# Patient Record
Sex: FEMALE | Race: WHITE | NOT HISPANIC OR LATINO | Employment: FULL TIME | URBAN - METROPOLITAN AREA
[De-identification: names, ages, dates, MRNs, and addresses within clinical notes are randomized per-mention and may not be internally consistent; named-entity substitution may affect disease eponyms.]

---

## 2017-02-09 ENCOUNTER — OFFICE VISIT (OUTPATIENT)
Dept: MEDICAL GROUP | Facility: MEDICAL CENTER | Age: 62
End: 2017-02-09
Payer: COMMERCIAL

## 2017-02-09 VITALS
TEMPERATURE: 97.8 F | WEIGHT: 109 LBS | BODY MASS INDEX: 21.4 KG/M2 | DIASTOLIC BLOOD PRESSURE: 72 MMHG | HEART RATE: 78 BPM | OXYGEN SATURATION: 98 % | SYSTOLIC BLOOD PRESSURE: 102 MMHG | HEIGHT: 60 IN

## 2017-02-09 DIAGNOSIS — Z12.11 SCREENING FOR COLON CANCER: ICD-10-CM

## 2017-02-09 DIAGNOSIS — Z13.29 SCREENING FOR THYROID DISORDER: ICD-10-CM

## 2017-02-09 DIAGNOSIS — J01.00 ACUTE NON-RECURRENT MAXILLARY SINUSITIS: ICD-10-CM

## 2017-02-09 DIAGNOSIS — Z78.0 POSTMENOPAUSAL: ICD-10-CM

## 2017-02-09 DIAGNOSIS — Z13.220 SCREENING FOR HYPERLIPIDEMIA: ICD-10-CM

## 2017-02-09 DIAGNOSIS — Z13.21 ENCOUNTER FOR VITAMIN DEFICIENCY SCREENING: ICD-10-CM

## 2017-02-09 DIAGNOSIS — R53.83 FATIGUE, UNSPECIFIED TYPE: ICD-10-CM

## 2017-02-09 DIAGNOSIS — Z13.1 SCREENING FOR DIABETES MELLITUS: ICD-10-CM

## 2017-02-09 DIAGNOSIS — Z13.0 SCREENING FOR DEFICIENCY ANEMIA: ICD-10-CM

## 2017-02-09 DIAGNOSIS — Z12.31 ENCOUNTER FOR SCREENING MAMMOGRAM FOR BREAST CANCER: ICD-10-CM

## 2017-02-09 PROBLEM — J06.9 VIRAL UPPER RESPIRATORY TRACT INFECTION: Status: ACTIVE | Noted: 2017-02-09

## 2017-02-09 PROCEDURE — 99204 OFFICE O/P NEW MOD 45 MIN: CPT | Performed by: FAMILY MEDICINE

## 2017-02-09 RX ORDER — AMOXICILLIN 500 MG/1
1000 CAPSULE ORAL 2 TIMES DAILY
Qty: 40 CAP | Refills: 0 | Status: SHIPPED | OUTPATIENT
Start: 2017-02-09 | End: 2017-08-11

## 2017-02-09 ASSESSMENT — PATIENT HEALTH QUESTIONNAIRE - PHQ9: CLINICAL INTERPRETATION OF PHQ2 SCORE: 0

## 2017-02-09 NOTE — MR AVS SNAPSHOT
Aminah Maldonado   2017 8:40 AM   Office Visit   MRN: [id number]    Department:  [facility]   Dept Phone:  [phone]    Description:  Female : 1955   Provider:  Evi Suggs M.D.           Reason for Visit     Establish Care     URI 1 week      Allergies as of 2017     No Known Allergies      You were diagnosed with     Acute non-recurrent maxillary sinusitis   [7582075]       Screening for deficiency anemia   [625828]       Screening for diabetes mellitus   [V77.1.ICD-9-CM]       Screening for hyperlipidemia   [374433]       Encounter for screening mammogram for breast cancer   [1293499]       Screening for thyroid disorder   [V77.0.ICD-9-CM]       Encounter for vitamin deficiency screening   [408185]       Postmenopausal   [429519]       Fatigue, unspecified type   [0368692]       Screening for colon cancer   [562929]         Vital Signs     Blood Pressure Pulse Temperature Height Weight Body Mass Index    102/72 mmHg 78 36.6 °C (97.8 °F) 1.524 m (5') 49.442 kg (109 lb) 21.29 kg/m2    Oxygen Saturation Breastfeeding? Smoking Status             98% No Never Smoker          Basic Information     Date Of Birth Sex Race Ethnicity Preferred Language    1955 Female White Non- English      Problem List              ICD-10-CM Priority Class Noted - Resolved    Acute non-recurrent maxillary sinusitis J01.00   2017 - Present    Fatigue R53.83   2017 - Present      Health Maintenance        Date Due Completion Dates    IMM DTaP/Tdap/Td Vaccine (1 - Tdap) 1976 ---    PAP SMEAR 1978 ---    MAMMOGRAM 1997 ---    COLONOSCOPY 2007 ---    IMM INFLUENZA (1) 2016 ---            Current Immunizations     No immunizations on file.      Below and/or attached are the medications your provider expects you to take. Review all of your home medications and newly ordered medications with your provider and/or pharmacist. Follow medication instructions as  directed by your provider and/or pharmacist. Please keep your medication list with you and share with your provider. Update the information when medications are discontinued, doses are changed, or new medications (including over-the-counter products) are added; and carry medication information at all times in the event of emergency situations     Allergies:  No Known Allergies          Medications  Valid as of: February 09, 2017 -  9:03 AM    Generic Name Brand Name Tablet Size Instructions for use    Amoxicillin (Cap) AMOXIL 500 MG Take 2 Caps by mouth 2 times a day.        .                 Medicines prescribed today were sent to:     [id number]  [place], [place] - [address] AT [facility]    [address] [place] [address]    Phone: [phone] Fax: [phone]    Open 24 Hours?: No      Medication refill instructions:       If your prescription bottle indicates you have medication refills left, it is not necessary to call your provider’s office. Please contact your pharmacy and they will refill your medication.    If your prescription bottle indicates you do not have any refills left, you may request refills at any time through one of the following ways: The online [employer] system (except Urgent Care), by calling your provider’s office, or by asking your pharmacy to contact your provider’s office with a refill request. Medication refills are processed only during regular business hours and may not be available until the next business day. Your provider may request additional information or to have a follow-up visit with you prior to refilling your medication.   *Please Note: Medication refills are assigned a new Rx number when refilled electronically. Your pharmacy may indicate that no refills were authorized even though a new prescription for the same medication is available at the pharmacy. Please request the medicine by name with the pharmacy before contacting your provider  for a refill.        Your To Do List     Future Labs/Procedures Complete By Expires    CBC WITH DIFFERENTIAL  As directed 2/10/2018    COMP METABOLIC PANEL  As directed 2/10/2018    DS-BONE DENSITY STUDY (DEXA)  As directed 8/12/2017    FREE THYROXINE  As directed 2/10/2018    LIPID PROFILE  As directed 2/10/2018    MA-SCREEN MAMMO W/CAD-BILAT  As directed 3/13/2018    TRIIDOTHYRONINE  As directed 2/9/2018    TSH  As directed 2/10/2018    VITAMIN D,25 HYDROXY  As directed 2/10/2018      Referral     A referral request has been sent to our patient care coordination department. Please allow 3-5 business days for us to process this request and contact you either by phone or mail. If you do not hear from us by the 5th business day, please call us at [phone].           TVSmiles Access Code: [email or web address]  Expires: 3/11/2017  8:12 AM    TVSmiles  A secure, online tool to manage your health information     [employer]’s TVSmiles® is a secure, online tool that connects you to your personalized health information from the privacy of your home -- day or night - making it very easy for you to manage your healthcare. Once the activation process is completed, you can even access your medical information using the TVSmiles azul, which is available for free in the Apple Azul store or Google Play store.     TVSmiles provides the following levels of access (as shown below):   My Chart Features   [facility] Primary [facility] Doctor [facility]  Specialists [facility]  Urgent  Care Non-[facility]  Primary Care  Doctor   Email your healthcare team securely and privately 24/7 X X X    Manage appointments: schedule your next appointment; view details of past/upcoming appointments X      Request prescription refills. X      View recent personal medical records, including lab and immunizations X X X X   View health record, including health history, allergies, medications X X X X   Read reports about your outpatient visits, procedures, consult and ER notes  X X X X   See your discharge summary, which is a recap of your hospital and/or ER visit that includes your diagnosis, lab results, and care plan. X X       How to register for [employer]:  1. Go to  [email or web address].  2. Click on the Sign Up Now box, which takes you to the New Member Sign Up page. You will need to provide the following information:  a. Enter your Polarion Software Access Code exactly as it appears at the top of this page. (You will not need to use this code after you’ve completed the sign-up process. If you do not sign up before the expiration date, you must request a new code.)   b. Enter your date of birth.   c. Enter your home email address.   d. Click Submit, and follow the next screen’s instructions.  3. Create a CatchFreet ID. This will be your CatchFreet login ID and cannot be changed, so think of one that is secure and easy to remember.  4. Create a [employer] Software password. You can change your password at any time.  5. Enter your Password Reset Question and Answer. This can be used at a later time if you forget your password.   6. Enter your e-mail address. This allows you to receive e-mail notifications when new information is available in [employer].  7. Click Sign Up. You can now view your health information.    For assistance activating your [employer] Software account, call [phone]

## 2017-02-09 NOTE — ASSESSMENT & PLAN NOTE
Started last Thursday or Friday with head cold.  Seems to be getting worse.  Now with left ear congestion, lots of yellow rhinorrhea.  No fever or chills,. Some cough at nighttime. No body aches.

## 2017-02-09 NOTE — ASSESSMENT & PLAN NOTE
Had for 1-2 years.  Consistent exercise 3-5 times a week for 45 minutes for 2 years,  Sleeps well.  Wakes tired.  Unsure if she snores. She does not believe she is ever been screened for obstructive sleep apnea.

## 2017-02-13 NOTE — PROGRESS NOTES
Chief Complaint   Patient presents with   • Establish Care   • URI     1 week         Aminah Maldonado is a 61 y.o. female here to establish care and for evaluation and management of:        HPI:    Acute non-recurrent maxillary sinusitis  Started last Thursday or Friday with head cold.  Seems to be getting worse.  Now with left ear congestion, lots of yellow rhinorrhea.  No fever or chills,. Some cough at nighttime. No body aches.    Fatigue  Had for 1-2 years.  Consistent exercise 3-5 times a week for 45 minutes for 2 years,  Sleeps well.  Wakes tired.  Unsure if she snores. She does not believe she is ever been screened for obstructive sleep apnea.        No Known Allergies    Current medicines (including changes today)  Current Outpatient Prescriptions   Medication Sig Dispense Refill   • amoxicillin (AMOXIL) 500 MG Cap Take 2 Caps by mouth 2 times a day. 40 Cap 0     No current facility-administered medications for this visit.     She  has a past medical history of Endometriosis.  She  has past surgical history that includes appendectomy; abdominal hysterectomy total; and tonsillectomy and adenoidectomy.  Social History   Substance Use Topics   • Smoking status: Never Smoker    • Smokeless tobacco: Never Used   • Alcohol Use: None     Social History     Social History Narrative   • No narrative on file     Family History   Problem Relation Age of Onset   • Heart Disease Mother    • Lung Disease Father    • Cancer Father 81     lung   • Diabetes Father    • Lung Disease Sister    • Cancer Sister 55     lung   • Diabetes Brother    • Kidney Disease Brother 49     on dialysis     Family Status   Relation Status Death Age   • Mother     • Father     • Sister Alive    • Brother Alive          ROS  No fever or chills.  No nausea or vomiting.  No chest pain or palpitations.  No  SOB.  No pain with urination or hematuria.  No black or bloody stools.  All other systems reviewed and are negative      Objective:     Blood pressure 102/72, pulse 78, temperature 36.6 °C (97.8 °F), height 1.524 m (5'), weight 49.442 kg (109 lb), SpO2 98 %, not currently breastfeeding. Body mass index is 21.29 kg/(m^2).  Physical Exam:      Well developed, well nourished.  Alert, oriented in no acute distress.  Psych: Eye contact is good, speech goal directed, affect calm  Eyes: conjunctiva non-injected, sclera non-icteric.  Ears: Pinna normal. TM pearly gray.   Nose: Nares are patent. Erythematous swollen mucosa with yellow discharge  Mouth: Oral mucous membranes pink and moist with no lesions. Moderate posterior pharynx erythema with yellow postnasal drainage  Neck Supple.  No adenopathy or masses in the neck or supraclavicular regions. No thyromegaly  Lungs: clear to auscultation bilaterally with good excursion. No wheezes or rhonchi  CV: regular rate and rhythm. No murmur        Assessment and Plan:   The following treatment plan was discussed    1. Acute non-recurrent maxillary sinusitis  Amoxicillin as directed. Increase fluids and rest. Call if not improving.  - amoxicillin (AMOXIL) 500 MG Cap; Take 2 Caps by mouth 2 times a day.  Dispense: 40 Cap; Refill: 0    2. Screening for deficiency anemia  Screening labs ordered.  Await results for counseling.    - CBC WITH DIFFERENTIAL; Future    3. Screening for diabetes mellitus  Screening labs ordered.  Await results for counseling.    - COMP METABOLIC PANEL; Future    4. Screening for hyperlipidemia  Screening labs ordered.  Await results for counseling.    - LIPID PROFILE; Future    5. Encounter for screening mammogram for breast cancer  Patient will schedule  - MA-SCREEN MAMMO W/CAD-BILAT; Future    6. Screening for thyroid disorder  Screening labs ordered.  Await results for counseling.    - TSH; Future  - FREE THYROXINE; Future  - TRIIDOTHYRONINE; Future    7. Encounter for vitamin deficiency screening  Screening labs ordered.  Await results for counseling.    - VITAMIN D,25  HYDROXY; Future    8. Postmenopausal  Screening for bone density  - DS-BONE DENSITY STUDY (DEXA); Future    9. Fatigue, unspecified type  Labs as noted. Refer for overnight pulse oximeter  - CBC WITH DIFFERENTIAL; Future  - COMP METABOLIC PANEL; Future  - TSH; Future  - FREE THYROXINE; Future  - TRIIDOTHYRONINE; Future    10. Screening for colon cancer  Refer for colonoscopy  - REFERRAL TO GI FOR COLONOSCOPY      Records requested.    Any change or worsening of signs or symptoms, patient encouraged to follow-up or report to the emergency room for further evaluation. Patient understands and agrees.    Followup: Return in about 1 year (around 2/9/2018).

## 2017-03-24 ENCOUNTER — HOSPITAL ENCOUNTER (OUTPATIENT)
Dept: LAB | Facility: MEDICAL CENTER | Age: 62
End: 2017-03-24
Attending: FAMILY MEDICINE
Payer: COMMERCIAL

## 2017-03-24 DIAGNOSIS — Z13.21 ENCOUNTER FOR VITAMIN DEFICIENCY SCREENING: ICD-10-CM

## 2017-03-24 DIAGNOSIS — Z13.29 SCREENING FOR THYROID DISORDER: ICD-10-CM

## 2017-03-24 DIAGNOSIS — Z13.1 SCREENING FOR DIABETES MELLITUS: ICD-10-CM

## 2017-03-24 DIAGNOSIS — Z13.0 SCREENING FOR DEFICIENCY ANEMIA: ICD-10-CM

## 2017-03-24 DIAGNOSIS — R53.83 FATIGUE, UNSPECIFIED TYPE: ICD-10-CM

## 2017-03-24 DIAGNOSIS — Z13.220 SCREENING FOR HYPERLIPIDEMIA: ICD-10-CM

## 2017-03-24 LAB
25(OH)D3 SERPL-MCNC: 38 NG/ML (ref 30–100)
ALBUMIN SERPL BCP-MCNC: 4.6 G/DL (ref 3.2–4.9)
ALBUMIN/GLOB SERPL: 1.8 G/DL
ALP SERPL-CCNC: 164 U/L (ref 30–99)
ALT SERPL-CCNC: 12 U/L (ref 2–50)
ANION GAP SERPL CALC-SCNC: 7 MMOL/L (ref 0–11.9)
AST SERPL-CCNC: 15 U/L (ref 12–45)
BASOPHILS # BLD AUTO: 0.07 K/UL (ref 0–0.12)
BASOPHILS NFR BLD AUTO: 1.9 % (ref 0–1.8)
BILIRUB SERPL-MCNC: 1.1 MG/DL (ref 0.1–1.5)
BUN SERPL-MCNC: 9 MG/DL (ref 8–22)
CALCIUM SERPL-MCNC: 9.6 MG/DL (ref 8.5–10.5)
CHLORIDE SERPL-SCNC: 102 MMOL/L (ref 96–112)
CHOLEST SERPL-MCNC: 174 MG/DL (ref 100–199)
CO2 SERPL-SCNC: 29 MMOL/L (ref 20–33)
CREAT SERPL-MCNC: 0.61 MG/DL (ref 0.5–1.4)
EOSINOPHIL # BLD: 0.13 K/UL (ref 0–0.51)
EOSINOPHIL NFR BLD AUTO: 3.6 % (ref 0–6.9)
ERYTHROCYTE [DISTWIDTH] IN BLOOD BY AUTOMATED COUNT: 43.8 FL (ref 35.9–50)
GLOBULIN SER CALC-MCNC: 2.6 G/DL (ref 1.9–3.5)
GLUCOSE SERPL-MCNC: 89 MG/DL (ref 65–99)
HCT VFR BLD AUTO: 41.6 % (ref 37–47)
HDLC SERPL-MCNC: 56 MG/DL
HGB BLD-MCNC: 13.6 G/DL (ref 12–16)
IMM GRANULOCYTES # BLD AUTO: 0.01 K/UL (ref 0–0.11)
IMM GRANULOCYTES NFR BLD AUTO: 0.3 % (ref 0–0.9)
LDLC SERPL CALC-MCNC: 105 MG/DL
LYMPHOCYTES # BLD: 1.05 K/UL (ref 1–4.8)
LYMPHOCYTES NFR BLD AUTO: 28.8 % (ref 22–41)
MCH RBC QN AUTO: 34.1 PG (ref 27–33)
MCHC RBC AUTO-ENTMCNC: 32.7 G/DL (ref 33.6–35)
MCV RBC AUTO: 104.3 FL (ref 81.4–97.8)
MONOCYTES # BLD: 0.26 K/UL (ref 0–0.85)
MONOCYTES NFR BLD AUTO: 7.1 % (ref 0–13.4)
NEUTROPHILS # BLD: 2.12 K/UL (ref 2–7.15)
NEUTROPHILS NFR BLD AUTO: 58.3 % (ref 44–72)
NRBC # BLD AUTO: 0 K/UL
NRBC BLD-RTO: 0 /100 WBC
PLATELET # BLD AUTO: 348 K/UL (ref 164–446)
PMV BLD AUTO: 10.8 FL (ref 9–12.9)
POTASSIUM SERPL-SCNC: 4 MMOL/L (ref 3.6–5.5)
PROT SERPL-MCNC: 7.2 G/DL (ref 6–8.2)
RBC # BLD AUTO: 3.99 M/UL (ref 4.2–5.4)
SODIUM SERPL-SCNC: 138 MMOL/L (ref 135–145)
T3 SERPL-MCNC: 172.8 NG/DL (ref 60–181)
T4 FREE SERPL-MCNC: 0.93 NG/DL (ref 0.53–1.43)
TRIGL SERPL-MCNC: 63 MG/DL (ref 0–149)
TSH SERPL DL<=0.005 MIU/L-ACNC: 1.45 UIU/ML (ref 0.3–3.7)
WBC # BLD AUTO: 3.6 K/UL (ref 4.8–10.8)

## 2017-03-24 PROCEDURE — 82306 VITAMIN D 25 HYDROXY: CPT

## 2017-03-24 PROCEDURE — 84480 ASSAY TRIIODOTHYRONINE (T3): CPT

## 2017-03-24 PROCEDURE — 84439 ASSAY OF FREE THYROXINE: CPT

## 2017-03-24 PROCEDURE — 84443 ASSAY THYROID STIM HORMONE: CPT

## 2017-03-24 PROCEDURE — 85025 COMPLETE CBC W/AUTO DIFF WBC: CPT

## 2017-03-24 PROCEDURE — 80053 COMPREHEN METABOLIC PANEL: CPT

## 2017-03-24 PROCEDURE — 80061 LIPID PANEL: CPT

## 2017-03-24 PROCEDURE — 36415 COLL VENOUS BLD VENIPUNCTURE: CPT

## 2017-03-29 ENCOUNTER — HOSPITAL ENCOUNTER (OUTPATIENT)
Dept: RADIOLOGY | Facility: MEDICAL CENTER | Age: 62
End: 2017-03-29

## 2017-03-29 ENCOUNTER — TELEPHONE (OUTPATIENT)
Dept: MEDICAL GROUP | Facility: MEDICAL CENTER | Age: 62
End: 2017-03-29

## 2017-03-29 DIAGNOSIS — D72.819 LEUKOPENIA, UNSPECIFIED TYPE: ICD-10-CM

## 2017-03-29 DIAGNOSIS — R74.8 ELEVATED ALKALINE PHOSPHATASE LEVEL: ICD-10-CM

## 2017-03-29 DIAGNOSIS — D75.89 MACROCYTOSIS: ICD-10-CM

## 2017-03-29 NOTE — TELEPHONE ENCOUNTER
Pt called and results were given. Pt states her alkaline phosphatase has been high for several years and is asking if there is a way to find out what is causing this. Pt would like to get to the bottom of this even if needs to be seen by a specialist. Please advise.

## 2017-03-29 NOTE — TELEPHONE ENCOUNTER
----- Message from Evi Suggs M.D. sent at 3/29/2017  3:57 PM PDT -----  Please notify patient of their lab results. Her alkaline phosphatase was elevated which can be related to the liver or bands. I would like to repeat it in 1 months. Her LDL cholesterol was a little high but her ratio is good. Discussed low-fat diet. Her white blood cell count was low but I don't have anything to compare to. I would like to repeat that in one month and also check B12 and folate levels. Her vitamin D and thyroid levels were normal.  Evi Suggs M.D.

## 2017-03-30 NOTE — TELEPHONE ENCOUNTER
Left message for patient to call back.     As per Dr. Suggs, she states that her alkaline being elevated could just be her normal, as she has had it several times and we do not have other labs to compare it to. So Dr. Suggs wants to repeat lab in 1 month.

## 2017-03-31 NOTE — TELEPHONE ENCOUNTER
Patient notified of Dr. Suggs's message, she will try to get her old lab results for Dr. Suggs to compare.

## 2017-03-31 NOTE — TELEPHONE ENCOUNTER
When we repeat her lab test in one month I'm also going to get another test that will help differentiate if it is from the bone or the liver. I would like to see copies of her old labs and depending on what those repeat results are we will do further testing.  Evi Suggs M.D.

## 2017-04-03 ENCOUNTER — HOSPITAL ENCOUNTER (OUTPATIENT)
Dept: RADIOLOGY | Facility: MEDICAL CENTER | Age: 62
End: 2017-04-03

## 2017-04-07 ENCOUNTER — HOSPITAL ENCOUNTER (OUTPATIENT)
Dept: LAB | Facility: MEDICAL CENTER | Age: 62
End: 2017-04-07
Attending: FAMILY MEDICINE
Payer: COMMERCIAL

## 2017-04-07 DIAGNOSIS — R74.8 ELEVATED ALKALINE PHOSPHATASE LEVEL: ICD-10-CM

## 2017-04-07 DIAGNOSIS — D75.89 MACROCYTOSIS: ICD-10-CM

## 2017-04-07 DIAGNOSIS — D72.819 LEUKOPENIA, UNSPECIFIED TYPE: ICD-10-CM

## 2017-04-07 LAB
ALP SERPL-CCNC: 166 U/L (ref 30–99)
GGT SERPL-CCNC: 14 U/L (ref 7–34)

## 2017-04-07 PROCEDURE — 36415 COLL VENOUS BLD VENIPUNCTURE: CPT

## 2017-04-07 PROCEDURE — 82977 ASSAY OF GGT: CPT

## 2017-04-07 PROCEDURE — 84075 ASSAY ALKALINE PHOSPHATASE: CPT

## 2017-04-11 ENCOUNTER — HOSPITAL ENCOUNTER (OUTPATIENT)
Dept: RADIOLOGY | Facility: MEDICAL CENTER | Age: 62
End: 2017-04-11
Attending: FAMILY MEDICINE
Payer: COMMERCIAL

## 2017-04-11 DIAGNOSIS — Z12.31 ENCOUNTER FOR SCREENING MAMMOGRAM FOR BREAST CANCER: ICD-10-CM

## 2017-04-11 DIAGNOSIS — Z78.0 POSTMENOPAUSAL: ICD-10-CM

## 2017-04-11 PROCEDURE — 77063 BREAST TOMOSYNTHESIS BI: CPT

## 2017-04-11 PROCEDURE — 77080 DXA BONE DENSITY AXIAL: CPT

## 2017-04-14 ENCOUNTER — TELEPHONE (OUTPATIENT)
Dept: MEDICAL GROUP | Facility: MEDICAL CENTER | Age: 62
End: 2017-04-14

## 2017-04-14 NOTE — TELEPHONE ENCOUNTER
----- Message from Evi Suggs M.D. sent at 4/14/2017  9:01 AM PDT -----  Please notify patient of their lab results.  I would like her to recheck the alkaline phosphatase one more time but this needs to be first thing in the morning before she eats. Her mammogram was normal. Her bone density showed some osteopenia and we could either start her on medication or she could increase her weightbearing exercise and supplement with vitamin D. Please find out what her preferences.  Evi Suggs M.D.

## 2017-04-20 NOTE — TELEPHONE ENCOUNTER
Patient notified of results, she states she will have her lab work done at the end of this month and then make an appt to discuss results for her lab and bone density.

## 2017-05-20 ENCOUNTER — HOSPITAL ENCOUNTER (OUTPATIENT)
Dept: LAB | Facility: MEDICAL CENTER | Age: 62
End: 2017-05-20
Attending: FAMILY MEDICINE
Payer: COMMERCIAL

## 2017-05-20 LAB
ALP SERPL-CCNC: 173 U/L (ref 30–99)
BASOPHILS # BLD AUTO: 1.3 % (ref 0–1.8)
BASOPHILS # BLD: 0.07 K/UL (ref 0–0.12)
EOSINOPHIL # BLD AUTO: 0.07 K/UL (ref 0–0.51)
EOSINOPHIL NFR BLD: 1.3 % (ref 0–6.9)
ERYTHROCYTE [DISTWIDTH] IN BLOOD BY AUTOMATED COUNT: 44.1 FL (ref 35.9–50)
FOLATE SERPL-MCNC: 12.4 NG/ML
HCT VFR BLD AUTO: 42.1 % (ref 37–47)
HGB BLD-MCNC: 14 G/DL (ref 12–16)
IMM GRANULOCYTES # BLD AUTO: 0.02 K/UL (ref 0–0.11)
IMM GRANULOCYTES NFR BLD AUTO: 0.4 % (ref 0–0.9)
LYMPHOCYTES # BLD AUTO: 0.97 K/UL (ref 1–4.8)
LYMPHOCYTES NFR BLD: 17.9 % (ref 22–41)
MCH RBC QN AUTO: 34.3 PG (ref 27–33)
MCHC RBC AUTO-ENTMCNC: 33.3 G/DL (ref 33.6–35)
MCV RBC AUTO: 103.2 FL (ref 81.4–97.8)
MONOCYTES # BLD AUTO: 0.27 K/UL (ref 0–0.85)
MONOCYTES NFR BLD AUTO: 5 % (ref 0–13.4)
NEUTROPHILS # BLD AUTO: 4.01 K/UL (ref 2–7.15)
NEUTROPHILS NFR BLD: 74.1 % (ref 44–72)
NRBC # BLD AUTO: 0 K/UL
NRBC BLD AUTO-RTO: 0 /100 WBC
PLATELET # BLD AUTO: 335 K/UL (ref 164–446)
PMV BLD AUTO: 10.7 FL (ref 9–12.9)
RBC # BLD AUTO: 4.08 M/UL (ref 4.2–5.4)
VIT B12 SERPL-MCNC: 393 PG/ML (ref 211–911)
WBC # BLD AUTO: 5.4 K/UL (ref 4.8–10.8)

## 2017-05-20 PROCEDURE — 85025 COMPLETE CBC W/AUTO DIFF WBC: CPT

## 2017-05-20 PROCEDURE — 82746 ASSAY OF FOLIC ACID SERUM: CPT

## 2017-05-20 PROCEDURE — 84075 ASSAY ALKALINE PHOSPHATASE: CPT

## 2017-05-20 PROCEDURE — 82607 VITAMIN B-12: CPT

## 2017-05-20 PROCEDURE — 36415 COLL VENOUS BLD VENIPUNCTURE: CPT

## 2017-05-24 ENCOUNTER — TELEPHONE (OUTPATIENT)
Dept: MEDICAL GROUP | Facility: MEDICAL CENTER | Age: 62
End: 2017-05-24

## 2017-05-24 NOTE — TELEPHONE ENCOUNTER
----- Message from RAFY Bowden sent at 5/24/2017  3:39 PM PDT -----  Please inform patient:  Alkaline phosphatase continues to be elevated.  B12 and folate levels are normal. Blood counts are stable. Please schedule follow-up with Dr. Suggs to further discuss abnormal labs

## 2017-05-26 ENCOUNTER — OFFICE VISIT (OUTPATIENT)
Dept: MEDICAL GROUP | Facility: MEDICAL CENTER | Age: 62
End: 2017-05-26
Payer: COMMERCIAL

## 2017-05-26 VITALS
SYSTOLIC BLOOD PRESSURE: 104 MMHG | BODY MASS INDEX: 21.2 KG/M2 | TEMPERATURE: 98.3 F | HEART RATE: 63 BPM | DIASTOLIC BLOOD PRESSURE: 70 MMHG | HEIGHT: 60 IN | WEIGHT: 108 LBS | OXYGEN SATURATION: 98 %

## 2017-05-26 DIAGNOSIS — D75.89 MACROCYTOSIS WITHOUT ANEMIA: ICD-10-CM

## 2017-05-26 DIAGNOSIS — R40.0 UNCONTROLLED DAYTIME SOMNOLENCE: ICD-10-CM

## 2017-05-26 DIAGNOSIS — R74.8 ELEVATED SERUM ALKALINE PHOSPHATASE LEVEL: ICD-10-CM

## 2017-05-26 PROCEDURE — 99213 OFFICE O/P EST LOW 20 MIN: CPT | Performed by: FAMILY MEDICINE

## 2017-05-26 NOTE — MR AVS SNAPSHOT
Aminah Maldonado   2017 4:40 PM   Office Visit   MRN: [id number]    Department:  [facility]   Dept Phone:  [phone]    Description:  Female : 1955   Provider:  Evi Suggs M.D.           Reason for Visit     Results lab results      Allergies as of 2017     No Known Allergies      You were diagnosed with     Uncontrolled daytime somnolence   [634655]       Elevated serum alkaline phosphatase level   [012542]       Macrocytosis without anemia   [644522]         Vital Signs     Blood Pressure Pulse Temperature Height Weight Body Mass Index    104/70 mmHg 63 36.8 °C (98.3 °F) 1.524 m (5') 48.988 kg (108 lb) 21.09 kg/m2    Oxygen Saturation Breastfeeding? Smoking Status             98% No Never Smoker          Basic Information     Date Of Birth Sex Race Ethnicity Preferred Language    1955 Female White Non- English      Problem List              ICD-10-CM Priority Class Noted - Resolved    Acute non-recurrent maxillary sinusitis J01.00   2017 - Present    Fatigue R53.83   2017 - Present    Uncontrolled daytime somnolence R40.0   2017 - Present    Elevated serum alkaline phosphatase level R74.8   2017 - Present    Macrocytosis without anemia D75.89   2017 - Present      Health Maintenance        Date Due Completion Dates    COLONOSCOPY 2016 (Done)    Override on 2013: Done    IMM DTaP/Tdap/Td Vaccine (1 - Tdap) 2018 (Originally 1976) ---    MAMMOGRAM 2018, 2015 (Done)    Override on 2015: Done            Current Immunizations     No immunizations on file.      Below and/or attached are the medications your provider expects you to take. Review all of your home medications and newly ordered medications with your provider and/or pharmacist. Follow medication instructions as directed by your provider and/or pharmacist. Please keep your medication list with you and share with your provider.  Update the information when medications are discontinued, doses are changed, or new medications (including over-the-counter products) are added; and carry medication information at all times in the event of emergency situations     Allergies:  No Known Allergies          Medications  Valid as of: May 26, 2017 -  5:38 PM    Generic Name Brand Name Tablet Size Instructions for use    Amoxicillin (Cap) AMOXIL 500 MG Take 2 Caps by mouth 2 times a day.        .                 Medicines prescribed today were sent to:     [facility] [address] [place] [address], [place] - [address] AT [facility]    [address] [facility] [place] [address]    Phone: [phone] Fax: [phone]    Open 24 Hours?: No      Medication refill instructions:       If your prescription bottle indicates you have medication refills left, it is not necessary to call your provider’s office. Please contact your pharmacy and they will refill your medication.    If your prescription bottle indicates you do not have any refills left, you may request refills at any time through one of the following ways: The online [employer] system (except Urgent Care), by calling your provider’s office, or by asking your pharmacy to contact your provider’s office with a refill request. Medication refills are processed only during regular business hours and may not be available until the next business day. Your provider may request additional information or to have a follow-up visit with you prior to refilling your medication.   *Please Note: Medication refills are assigned a new Rx number when refilled electronically. Your pharmacy may indicate that no refills were authorized even though a new prescription for the same medication is available at the pharmacy. Please request the medicine by name with the pharmacy before contacting your provider for a refill.        Your To Do List     Future Labs/Procedures Complete By Expires    EUFEMIA ANTIBODY WITH REFLEX  As  directed 5/27/2018    C-TELOPEPTIDE SERUM B-CROSS-LINKED (ARUP)  As directed 5/26/2018    CRP QUANTITIVE (NON-CARDIAC)  As directed 5/27/2018    PTH INTACT (PTH ONLY)  As directed 5/26/2018    SPEP W/REFLEX TO ANDREA, A, G, M  As directed 5/26/2018    URINALYSIS,CULTURE IF INDICATED  As directed 5/26/2018    WESTERGREN SED RATE  As directed 5/27/2018         MyChart Status: Patient Declined

## 2017-06-07 ENCOUNTER — HOSPITAL ENCOUNTER (OUTPATIENT)
Dept: LAB | Facility: MEDICAL CENTER | Age: 62
End: 2017-06-07
Attending: FAMILY MEDICINE
Payer: COMMERCIAL

## 2017-06-07 DIAGNOSIS — R40.0 UNCONTROLLED DAYTIME SOMNOLENCE: ICD-10-CM

## 2017-06-07 DIAGNOSIS — D75.89 MACROCYTOSIS WITHOUT ANEMIA: ICD-10-CM

## 2017-06-07 DIAGNOSIS — R74.8 ELEVATED SERUM ALKALINE PHOSPHATASE LEVEL: ICD-10-CM

## 2017-06-07 PROCEDURE — 85652 RBC SED RATE AUTOMATED: CPT

## 2017-06-07 PROCEDURE — 86235 NUCLEAR ANTIGEN ANTIBODY: CPT | Mod: 91

## 2017-06-07 PROCEDURE — 36415 COLL VENOUS BLD VENIPUNCTURE: CPT

## 2017-06-07 PROCEDURE — 86140 C-REACTIVE PROTEIN: CPT

## 2017-06-07 PROCEDURE — 83970 ASSAY OF PARATHORMONE: CPT

## 2017-06-07 PROCEDURE — 86225 DNA ANTIBODY NATIVE: CPT

## 2017-06-07 PROCEDURE — 84165 PROTEIN E-PHORESIS SERUM: CPT

## 2017-06-07 PROCEDURE — 86038 ANTINUCLEAR ANTIBODIES: CPT

## 2017-06-07 PROCEDURE — 84160 ASSAY OF PROTEIN ANY SOURCE: CPT

## 2017-06-07 PROCEDURE — 82523 COLLAGEN CROSSLINKS: CPT

## 2017-06-08 LAB
CRP SERPL HS-MCNC: 0.09 MG/DL (ref 0–0.75)
ERYTHROCYTE [SEDIMENTATION RATE] IN BLOOD BY WESTERGREN METHOD: 2 MM/HOUR (ref 0–30)
PTH-INTACT SERPL-MCNC: 38 PG/ML (ref 14–72)

## 2017-06-09 LAB
COLLAGEN CTX SERPL-MCNC: 351 PG/ML
NUCLEAR IGG SER QL IA: NORMAL

## 2017-06-10 LAB
ALBUMIN SERPL-MCNC: 4.94 G/DL (ref 3.75–5.01)
ALPHA1 GLOB SERPL ELPH-MCNC: 0.27 G/DL (ref 0.19–0.46)
ALPHA2 GLOB SERPL ELPH-MCNC: 0.65 G/DL (ref 0.48–1.05)
B-GLOBULIN SERPL ELPH-MCNC: 0.77 G/DL (ref 0.48–1.1)
GAMMA GLOB SERPL ELPH-MCNC: 0.88 G/DL (ref 0.62–1.51)
INTERPRETATION SERPL IFE-IMP: NORMAL
MONOCLON BAND OBS SERPL: NORMAL
PATHOLOGY STUDY: NORMAL
PROT SERPL-MCNC: 7.5 G/DL (ref 6–8.3)

## 2017-06-12 ENCOUNTER — TELEPHONE (OUTPATIENT)
Dept: MEDICAL GROUP | Facility: MEDICAL CENTER | Age: 62
End: 2017-06-12

## 2017-06-12 NOTE — TELEPHONE ENCOUNTER
----- Message from Evi Suggs M.D. sent at 6/9/2017  5:05 PM PDT -----  Please notify patient of their normal lab results.  Evi Suggs M.D.

## 2017-06-12 NOTE — Clinical Note
June 23, 2017        Aminah Maldonado,    I have been trying to call your regarding your concern for your symptoms of fatigue. Dr. Suggs wants to know if you ever did the order for the over night pulse oximeter, that was ordered back in May.    Please let me know and give me a call back at [phone].                Thanks,  Amara Baez  Medical Assistant

## 2017-06-13 NOTE — TELEPHONE ENCOUNTER
Pt notified. Pt would like to know what she should do about her fatigue.     Call back number: [phone], ok to leave a detailed message.

## 2017-06-20 ENCOUNTER — TELEPHONE (OUTPATIENT)
Dept: MEDICAL GROUP | Facility: MEDICAL CENTER | Age: 62
End: 2017-06-20

## 2017-06-20 NOTE — TELEPHONE ENCOUNTER
Left message for patient to call back. Did she ever do her over night pulse ox that was ordered back in May?

## 2017-06-20 NOTE — TELEPHONE ENCOUNTER
----- Message from Evi Suggs M.D. sent at 6/14/2017  9:44 AM PDT -----  Has she had an overnight oxcimeter?  If not we need to set this up.  Evi Suggs M.D.    ----- Message -----     From: Lab Int     Sent: 6/8/2017  12:38 AM       To: Evi Suggs M.D.

## 2017-06-22 NOTE — ASSESSMENT & PLAN NOTE
Patient feels exhausted when she wakes up in the morning. She does not feel rested. Her fatigue worsens as the day goes on.  she feels as if she could nap at any moment. She does not believe that she snores or has apneic episodes. She has not had any episodes where she has fallen asleep while driving or doing an important task.

## 2017-06-22 NOTE — ASSESSMENT & PLAN NOTE
Patient had an elevated alkaline phosphatase of 173. She denies any increased muscle pain, jaundice or dark urine.

## 2017-06-22 NOTE — PROGRESS NOTES
Subjective:     Chief Complaint   Patient presents with   • Results     lab results       Aminah Maldonado is a 61 y.o. female here today for evaluation and management of:    Uncontrolled daytime somnolence  Patient feels exhausted when she wakes up in the morning. She does not feel rested. Her fatigue worsens as the day goes on.  she feels as if she could nap at any moment. She does not believe that she snores or has apneic episodes. She has not had any episodes where she has fallen asleep while driving or doing an important task.    Elevated serum alkaline phosphatase level  Patient had an elevated alkaline phosphatase of 173. She denies any increased muscle pain, jaundice or dark urine.       No Known Allergies    Current medicines (including changes today)  Current Outpatient Prescriptions   Medication Sig Dispense Refill   • amoxicillin (AMOXIL) 500 MG Cap Take 2 Caps by mouth 2 times a day. (Patient not taking: Reported on 5/26/2017) 40 Cap 0     No current facility-administered medications for this visit.       She  has a past medical history of Endometriosis.    Patient Active Problem List    Diagnosis Date Noted   • Uncontrolled daytime somnolence 05/26/2017   • Elevated serum alkaline phosphatase level 05/26/2017   • Macrocytosis without anemia 05/26/2017   • Acute non-recurrent maxillary sinusitis 02/09/2017   • Fatigue 02/09/2017       ROS   No fever or chills.  No nausea or vomiting.  No chest pain or palpitations.  No cough or SOB.  No pain with urination or hematuria.  No black or bloody stools.       Objective:     Blood pressure 104/70, pulse 63, temperature 36.8 °C (98.3 °F), height 1.524 m (5'), weight 48.988 kg (108 lb), SpO2 98 %, not currently breastfeeding. Body mass index is 21.09 kg/(m^2).   Physical Exam:  Well developed, well nourished.  Alert, oriented in no acute distress.  Eye contact is good, speech goal directed, affect calm  Eyes: conjunctiva non-injected, sclera  non-icteric.  Ears: Pinna normal. TM pearly gray.   Nose: Nares are patent.  Normal mucosa  Mouth: Oral mucous membranes pink and moist with no lesions.  Neck Supple.  No adenopathy or masses in the neck or supraclavicular regions. No thyromegaly  Lungs: clear to auscultation bilaterally with good excursion. No wheezes or rhonchi  CV: regular rate and rhythm. No murmur      Assessment and Plan:   The following treatment plan was discussed    1. Uncontrolled daytime somnolence   We will look at autoimmune and other causes for daytime somnolence. I've also asked the patient to get an overnight oximetry. Await the results  - SPEP W/REFLEX TO ANDREA, A, G, M; Future  - EUFEMIA ANTIBODY WITH REFLEX; Future  - CRP QUANTITIVE (NON-CARDIAC); Future  - WESTERGREN SED RATE; Future  - PTH INTACT (PTH ONLY); Future  - C-TELOPEPTIDE SERUM B-CROSS-LINKED ([facility]); Future    2. Elevated serum alkaline phosphatase level  See #1  - SPEP W/REFLEX TO ANDREA, A, G, M; Future  - EUFEMIA ANTIBODY WITH REFLEX; Future  - CRP QUANTITIVE (NON-CARDIAC); Future  - WESTERGREN SED RATE; Future  - PTH INTACT (PTH ONLY); Future  - C-TELOPEPTIDE SERUM B-CROSS-LINKED ([facility]); Future    3. Macrocytosis without anemia  She #1  - SPEP W/REFLEX TO ANDREA, A, G, M; Future  - EUFEMIA ANTIBODY WITH REFLEX; Future  - CRP QUANTITIVE (NON-CARDIAC); Future  - WESTERGREN SED RATE; Future  - PTH INTACT (PTH ONLY); Future  - C-TELOPEPTIDE SERUM B-CROSS-LINKED ([facility]); Future    Any change or worsening of signs or symptoms, patient encouraged to follow-up or report to the emergency room for further evaluation. Patient understands and agrees.    Followup: Return if symptoms worsen or fail to improve.

## 2017-06-29 ENCOUNTER — TELEPHONE (OUTPATIENT)
Dept: MEDICAL GROUP | Facility: MEDICAL CENTER | Age: 62
End: 2017-06-29

## 2017-06-29 NOTE — TELEPHONE ENCOUNTER
Patient left message stating that she has received a call from the company to have her overnight pulse ox done, but she wants to wait until end of August to schedule, as she will be out of town. She will call when she gets back to get it scheduled.

## 2017-07-02 ENCOUNTER — OFFICE VISIT (OUTPATIENT)
Dept: URGENT CARE | Facility: PHYSICIAN GROUP | Age: 62
End: 2017-07-02
Payer: COMMERCIAL

## 2017-07-02 VITALS
TEMPERATURE: 97.9 F | DIASTOLIC BLOOD PRESSURE: 80 MMHG | WEIGHT: 108 LBS | HEIGHT: 60 IN | HEART RATE: 71 BPM | OXYGEN SATURATION: 97 % | BODY MASS INDEX: 21.2 KG/M2 | SYSTOLIC BLOOD PRESSURE: 110 MMHG

## 2017-07-02 DIAGNOSIS — J30.9 ALLERGIC RHINITIS, UNSPECIFIED ALLERGIC RHINITIS TRIGGER, UNSPECIFIED RHINITIS SEASONALITY: ICD-10-CM

## 2017-07-02 PROCEDURE — 99214 OFFICE O/P EST MOD 30 MIN: CPT | Performed by: FAMILY MEDICINE

## 2017-07-02 RX ORDER — FLUTICASONE PROPIONATE 50 MCG
1 SPRAY, SUSPENSION (ML) NASAL DAILY
Qty: 16 G | Refills: 0 | Status: SHIPPED | OUTPATIENT
Start: 2017-07-02 | End: 2018-05-30

## 2017-07-02 RX ORDER — CETIRIZINE HYDROCHLORIDE 10 MG/1
10 TABLET ORAL DAILY
Qty: 30 TAB | Refills: 0 | Status: SHIPPED | OUTPATIENT
Start: 2017-07-02 | End: 2018-05-30

## 2017-07-02 ASSESSMENT — ENCOUNTER SYMPTOMS
CHILLS: 0
VOMITING: 0
NAUSEA: 0
FEVER: 0

## 2017-07-02 NOTE — PROGRESS NOTES
Subjective:      Aminah Maldonado is a 61 y.o. female who presents with Allergic Rhinitis            Allergic Reaction  This is a new problem. The current episode started more than 1 year ago. The problem occurs constantly. The problem has been gradually worsening. Pertinent negatives include no chills, fever, nausea or vomiting.       Review of Systems   Constitutional: Negative for fever and chills.   Gastrointestinal: Negative for nausea and vomiting.     No Known Allergies      Objective:     /80 mmHg  Pulse 71  Temp(Src) 36.6 °C (97.9 °F)  Ht 1.524 m (5')  Wt 48.988 kg (108 lb)  BMI 21.09 kg/m2  SpO2 97%     Physical Exam   Constitutional: She is oriented to person, place, and time. She appears well-developed and well-nourished. No distress.   HENT:   Head: Normocephalic and atraumatic.   Nose: Mucosal edema and rhinorrhea present. No sinus tenderness. Right sinus exhibits no maxillary sinus tenderness and no frontal sinus tenderness. Left sinus exhibits no maxillary sinus tenderness and no frontal sinus tenderness.   Eyes: Conjunctivae and EOM are normal. Pupils are equal, round, and reactive to light.   Cardiovascular: Normal rate, regular rhythm, normal heart sounds and intact distal pulses.    No murmur heard.  Pulmonary/Chest: Effort normal and breath sounds normal. No respiratory distress.   Abdominal: Soft. Bowel sounds are normal. She exhibits no distension. There is no tenderness.   Musculoskeletal: Normal range of motion.   Neurological: She is alert and oriented to person, place, and time. She has normal reflexes. No sensory deficit.   Skin: Skin is warm and dry.   Psychiatric: She has a normal mood and affect. Her behavior is normal.               Assessment/Plan:     1. Allergic rhinitis, unspecified allergic rhinitis trigger, unspecified rhinitis seasonality  Differential diagnosis, natural history, supportive care, and indications for immediate follow-up discussed.   - fluticasone  (FLONASE) 50 MCG/ACT nasal spray; Spray 1 Spray in nose every day.  Dispense: 16 g; Refill: 0  - cetirizine (ZYRTEC ALLERGY) 10 MG Tab; Take 1 Tab by mouth every day.  Dispense: 30 Tab; Refill: 0

## 2017-07-02 NOTE — PATIENT INSTRUCTIONS
Allergic Rhinitis  Allergic rhinitis is when the mucous membranes in the nose respond to allergens. Allergens are particles in the air that cause your body to have an allergic reaction. This causes you to release allergic antibodies. Through a chain of events, these eventually cause you to release histamine into the blood stream. Although meant to protect the body, it is this release of histamine that causes your discomfort, such as frequent sneezing, congestion, and an itchy, runny nose.   CAUSES  Seasonal allergic rhinitis (hay fever) is caused by pollen allergens that may come from grasses, trees, and weeds. Year-round allergic rhinitis (perennial allergic rhinitis) is caused by allergens such as house dust mites, pet dander, and mold spores.  SYMPTOMS  · Nasal stuffiness (congestion).  · Itchy, runny nose with sneezing and tearing of the eyes.  DIAGNOSIS  Your health care provider can help you determine the allergen or allergens that trigger your symptoms. If you and your health care provider are unable to determine the allergen, skin or blood testing may be used. Your health care provider will diagnose your condition after taking your health history and performing a physical exam. Your health care provider may assess you for other related conditions, such as asthma, pink eye, or an ear infection.  TREATMENT  Allergic rhinitis does not have a cure, but it can be controlled by:  · Medicines that block allergy symptoms. These may include allergy shots, nasal sprays, and oral antihistamines.  · Avoiding the allergen.  Hay fever may often be treated with antihistamines in pill or nasal spray forms. Antihistamines block the effects of histamine. There are over-the-counter medicines that may help with nasal congestion and swelling around the eyes. Check with your health care provider before taking or giving this medicine.  If avoiding the allergen or the medicine prescribed do not work, there are many new medicines  your health care provider can prescribe. Stronger medicine may be used if initial measures are ineffective. Desensitizing injections can be used if medicine and avoidance does not work. Desensitization is when a patient is given ongoing shots until the body becomes less sensitive to the allergen. Make sure you follow up with your health care provider if problems continue.  HOME CARE INSTRUCTIONS  It is not possible to completely avoid allergens, but you can reduce your symptoms by taking steps to limit your exposure to them. It helps to know exactly what you are allergic to so that you can avoid your specific triggers.  SEEK MEDICAL CARE IF:  · You have a fever.  · You develop a cough that does not stop easily (persistent).  · You have shortness of breath.  · You start wheezing.  · Symptoms interfere with normal daily activities.     This information is not intended to replace advice given to you by your health care provider. Make sure you discuss any questions you have with your health care provider.     Document Released: 09/12/2002 Document Revised: 01/08/2016 Document Reviewed: 08/25/2014  [employer] Interactive Patient Education ©2016 [employer]

## 2017-08-04 ENCOUNTER — OFFICE VISIT (OUTPATIENT)
Dept: MEDICAL GROUP | Facility: MEDICAL CENTER | Age: 62
End: 2017-08-04
Payer: COMMERCIAL

## 2017-08-04 ENCOUNTER — HOSPITAL ENCOUNTER (OUTPATIENT)
Dept: LAB | Facility: MEDICAL CENTER | Age: 62
End: 2017-08-04
Attending: FAMILY MEDICINE
Payer: COMMERCIAL

## 2017-08-04 VITALS
HEART RATE: 85 BPM | TEMPERATURE: 98.4 F | DIASTOLIC BLOOD PRESSURE: 74 MMHG | SYSTOLIC BLOOD PRESSURE: 144 MMHG | OXYGEN SATURATION: 96 % | BODY MASS INDEX: 20.81 KG/M2 | HEIGHT: 60 IN | WEIGHT: 106 LBS

## 2017-08-04 DIAGNOSIS — R10.11 RUQ ABDOMINAL PAIN: ICD-10-CM

## 2017-08-04 DIAGNOSIS — R06.02 SOB (SHORTNESS OF BREATH): ICD-10-CM

## 2017-08-04 DIAGNOSIS — R74.8 ELEVATED SERUM ALKALINE PHOSPHATASE LEVEL: ICD-10-CM

## 2017-08-04 DIAGNOSIS — R05.9 COUGH: ICD-10-CM

## 2017-08-04 DIAGNOSIS — R42 DIZZINESS: ICD-10-CM

## 2017-08-04 LAB
ALBUMIN SERPL BCP-MCNC: 4.7 G/DL (ref 3.2–4.9)
ALBUMIN/GLOB SERPL: 1.4 G/DL
ALP SERPL-CCNC: 162 U/L (ref 30–99)
ALT SERPL-CCNC: 9 U/L (ref 2–50)
ANION GAP SERPL CALC-SCNC: 10 MMOL/L (ref 0–11.9)
AST SERPL-CCNC: 16 U/L (ref 12–45)
BASOPHILS # BLD AUTO: 0.6 % (ref 0–1.8)
BASOPHILS # BLD: 0.05 K/UL (ref 0–0.12)
BILIRUB SERPL-MCNC: 1.9 MG/DL (ref 0.1–1.5)
BUN SERPL-MCNC: 5 MG/DL (ref 8–22)
CALCIUM SERPL-MCNC: 10.2 MG/DL (ref 8.5–10.5)
CHLORIDE SERPL-SCNC: 103 MMOL/L (ref 96–112)
CO2 SERPL-SCNC: 27 MMOL/L (ref 20–33)
CREAT SERPL-MCNC: 0.59 MG/DL (ref 0.5–1.4)
DEPRECATED D DIMER PPP IA-ACNC: 228 NG/ML(D-DU)
EOSINOPHIL # BLD AUTO: 0.02 K/UL (ref 0–0.51)
EOSINOPHIL NFR BLD: 0.2 % (ref 0–6.9)
ERYTHROCYTE [DISTWIDTH] IN BLOOD BY AUTOMATED COUNT: 43.8 FL (ref 35.9–50)
GFR SERPL CREATININE-BSD FRML MDRD: >60 ML/MIN/1.73 M 2
GLOBULIN SER CALC-MCNC: 3.3 G/DL (ref 1.9–3.5)
GLUCOSE SERPL-MCNC: 95 MG/DL (ref 65–99)
HCT VFR BLD AUTO: 43.9 % (ref 37–47)
HGB BLD-MCNC: 14.5 G/DL (ref 12–16)
IMM GRANULOCYTES # BLD AUTO: 0.02 K/UL (ref 0–0.11)
IMM GRANULOCYTES NFR BLD AUTO: 0.2 % (ref 0–0.9)
LIPASE SERPL-CCNC: 14 U/L (ref 11–82)
LYMPHOCYTES # BLD AUTO: 1.02 K/UL (ref 1–4.8)
LYMPHOCYTES NFR BLD: 12 % (ref 22–41)
MCH RBC QN AUTO: 34.4 PG (ref 27–33)
MCHC RBC AUTO-ENTMCNC: 33 G/DL (ref 33.6–35)
MCV RBC AUTO: 104 FL (ref 81.4–97.8)
MONOCYTES # BLD AUTO: 0.64 K/UL (ref 0–0.85)
MONOCYTES NFR BLD AUTO: 7.5 % (ref 0–13.4)
NEUTROPHILS # BLD AUTO: 6.74 K/UL (ref 2–7.15)
NEUTROPHILS NFR BLD: 79.5 % (ref 44–72)
NRBC # BLD AUTO: 0 K/UL
NRBC BLD AUTO-RTO: 0 /100 WBC
PLATELET # BLD AUTO: 401 K/UL (ref 164–446)
PMV BLD AUTO: 11.2 FL (ref 9–12.9)
POTASSIUM SERPL-SCNC: 4.7 MMOL/L (ref 3.6–5.5)
PROT SERPL-MCNC: 8 G/DL (ref 6–8.2)
RBC # BLD AUTO: 4.22 M/UL (ref 4.2–5.4)
SODIUM SERPL-SCNC: 140 MMOL/L (ref 135–145)
WBC # BLD AUTO: 8.5 K/UL (ref 4.8–10.8)

## 2017-08-04 PROCEDURE — 85025 COMPLETE CBC W/AUTO DIFF WBC: CPT

## 2017-08-04 PROCEDURE — 36415 COLL VENOUS BLD VENIPUNCTURE: CPT

## 2017-08-04 PROCEDURE — 85379 FIBRIN DEGRADATION QUANT: CPT

## 2017-08-04 PROCEDURE — 80053 COMPREHEN METABOLIC PANEL: CPT

## 2017-08-04 PROCEDURE — 83690 ASSAY OF LIPASE: CPT

## 2017-08-04 PROCEDURE — 99214 OFFICE O/P EST MOD 30 MIN: CPT | Performed by: FAMILY MEDICINE

## 2017-08-04 NOTE — MR AVS SNAPSHOT
Aminah Maldonado   2017 8:40 AM   Office Visit   MRN: [id number]    Department:  [facility]   Dept Phone:  [phone]    Description:  Female : 1955   Provider:  Evi Suggs M.D.           Reason for Visit     RUQ Pain     Cough           Allergies as of 2017     No Known Allergies      You were diagnosed with     RUQ abdominal pain   [503376]       Cough   [786.2.ICD-9-CM]       Dizziness   [493778]       Elevated serum alkaline phosphatase level   [395693]       SOB (shortness of breath)   [485480]         Vital Signs     Blood Pressure Pulse Temperature Height Weight Body Mass Index    144/74 mmHg 85 36.9 °C (98.4 °F) 1.524 m (5') 48.081 kg (106 lb) 20.70 kg/m2    Oxygen Saturation Smoking Status                96% Never Smoker           Basic Information     Date Of Birth Sex Race Ethnicity Preferred Language    1955 Female White Non- English      Problem List              ICD-10-CM Priority Class Noted - Resolved    Acute non-recurrent maxillary sinusitis J01.00   2017 - Present    Fatigue R53.83   2017 - Present    Uncontrolled daytime somnolence R40.0   2017 - Present    Elevated serum alkaline phosphatase level R74.8   2017 - Present    Macrocytosis without anemia D75.89   2017 - Present    RUQ abdominal pain R10.11   2017 - Present    Cough R05   2017 - Present    Dizziness R42   2017 - Present    SOB (shortness of breath) R06.02   2017 - Present      Health Maintenance        Date Due Completion Dates    COLONOSCOPY 2016 (Done)    Override on 2013: Done    IMM INFLUENZA (1) 2017 (Originally 2017) ---    IMM DTaP/Tdap/Td Vaccine (1 - Tdap) 2018 (Originally 1976) ---    MAMMOGRAM 2018, 2015 (Done)    Override on 2015: Done            Current Immunizations     No immunizations on file.      Below and/or attached are the medications your provider expects you  to take. Review all of your home medications and newly ordered medications with your provider and/or pharmacist. Follow medication instructions as directed by your provider and/or pharmacist. Please keep your medication list with you and share with your provider. Update the information when medications are discontinued, doses are changed, or new medications (including over-the-counter products) are added; and carry medication information at all times in the event of emergency situations     Allergies:  No Known Allergies          Medications  Valid as of: August 04, 2017 - 10:32 AM    Generic Name Brand Name Tablet Size Instructions for use    Amoxicillin (Cap) AMOXIL 500 MG Take 2 Caps by mouth 2 times a day.        Cetirizine HCl (Tab) ZYRTEC 10 MG Take 1 Tab by mouth every day.        Fluticasone Propionate (Suspension) FLONASE 50 MCG/ACT Spray 1 Spray in nose every day.        .                 Medicines prescribed today were sent to:     [facility] [address] [place] [address], [place] - [address] AT [facility] [place] [address] [address] [facility] [address]    Phone: [phone] Fax: [phone]    Open 24 Hours?: No      Medication refill instructions:       If your prescription bottle indicates you have medication refills left, it is not necessary to call your provider’s office. Please contact your pharmacy and they will refill your medication.    If your prescription bottle indicates you do not have any refills left, you may request refills at any time through one of the following ways: The online [employer] system (except Urgent Care), by calling your provider’s office, or by asking your pharmacy to contact your provider’s office with a refill request. Medication refills are processed only during regular business hours and may not be available until the next business day. Your provider may request additional information or to have a follow-up visit with you prior to refilling your medication.   *Please  Note: Medication refills are assigned a new Rx number when refilled electronically. Your pharmacy may indicate that no refills were authorized even though a new prescription for the same medication is available at the pharmacy. Please request the medicine by name with the pharmacy before contacting your provider for a refill.        Your To Do List     Future Labs/Procedures Complete By Expires    CBC WITH DIFFERENTIAL  As directed 8/5/2018    COMP METABOLIC PANEL  As directed 8/5/2018    D-DIMER  As directed 8/4/2018    LIPASE  As directed 8/4/2018    US-ABDOMEN COMPLETE SURVEY  As directed 2/4/2018         "[employer] Access Code: Activation code not generated  Current [employer] Status: Active

## 2017-08-04 NOTE — ASSESSMENT & PLAN NOTE
"Complains of RUQ abdominal pain for the last 6 months but worsened one week ago.  She feels very fatigued and \"not right\".  Hurts to take a deep breath.  She has been coughing - clear to yellow mucus.  She has also had some shortness of breath.  Also has PND.  No fever or chills.  No nausea or vomiting.  Appetite is normal.  Last BM this am - no change.   If she takes a hot shower she has to go lay down afterwards because she is so light headed.  She also gets SOB after going up stairs.  Slight right calf pain but no swelling.  Recent air travel - went to [place] 7/15 - 7/25.  She reports her symptoms are worse first thing in the morning,  "

## 2017-08-07 NOTE — PROGRESS NOTES
"Subjective:     Chief Complaint   Patient presents with   • RUQ Pain   • Cough       Aminah Maldonado is a 61 y.o. female here today for evaluation and management of:    RUQ abdominal pain  Complains of RUQ abdominal pain for the last 6 months but worsened one week ago.  She feels very fatigued and \"not right\".  Hurts to take a deep breath.  She has been coughing - clear to yellow mucus.  She has also had some shortness of breath.  Also has PND.  No fever or chills.  No nausea or vomiting.  Appetite is normal.  Last BM this am - no change.   If she takes a hot shower she has to go lay down afterwards because she is so light headed.  She also gets SOB after going up stairs.  Slight right calf pain but no swelling.  Recent air travel - went to [place] 7/15 - 7/25.  She reports her symptoms are worse first thing in the morning,    Elevated serum alkaline phosphatase level  Patient has had an elevated alkaline phosphatase that we have been following.  She denies any jaundice or dark urine.       No Known Allergies    Current medicines (including changes today)  Current Outpatient Prescriptions   Medication Sig Dispense Refill   • fluticasone (FLONASE) 50 MCG/ACT nasal spray Spray 1 Spray in nose every day. 16 g 0   • cetirizine (ZYRTEC ALLERGY) 10 MG Tab Take 1 Tab by mouth every day. 30 Tab 0   • amoxicillin (AMOXIL) 500 MG Cap Take 2 Caps by mouth 2 times a day. (Patient not taking: Reported on 5/26/2017) 40 Cap 0     No current facility-administered medications for this visit.       She  has a past medical history of Endometriosis.    Patient Active Problem List    Diagnosis Date Noted   • RUQ abdominal pain 08/04/2017   • Cough 08/04/2017   • Dizziness 08/04/2017   • SOB (shortness of breath) 08/04/2017   • Uncontrolled daytime somnolence 05/26/2017   • Elevated serum alkaline phosphatase level 05/26/2017   • Macrocytosis without anemia 05/26/2017   • Acute non-recurrent maxillary sinusitis 02/09/2017   • Fatigue " 02/09/2017       ROS   No fever or chills.  No nausea or vomiting.  No chest pain or palpitations.  No cough or SOB.  No pain with urination or hematuria.  No black or bloody stools.       Objective:     Blood pressure 144/74, pulse 85, temperature 36.9 °C (98.4 °F), height 1.524 m (5'), weight 48.081 kg (106 lb), SpO2 96 %. Body mass index is 20.7 kg/(m^2).   Physical Exam:  Well developed, well nourished.  Alert, oriented in no acute distress.  Eye contact is good, speech goal directed, affect calm  Eyes: conjunctiva non-injected, sclera non-icteric.  Neck Supple.  No adenopathy or masses in the neck or supraclavicular regions. No thyromegaly  Lungs: clear to auscultation bilaterally with good excursion. No wheezes or rhonchi  CV: regular rate and rhythm. No murmur  Abdomen: soft, nontender, no masses or organomegaly.  No rebound or guarding  Ext: no edema, color normal, vascularity normal, temperature normal.  Mild bilateral calf tenderness          Assessment and Plan:   The following treatment plan was discussed    1. RUQ abdominal pain  Labs to assess.  Ultrasound of the abdomen  - CBC WITH DIFFERENTIAL; Future  - COMP METABOLIC PANEL; Future  - LIPASE; Future  - US-ABDOMEN COMPLETE SURVEY; Future    2. Cough  If she has a positive d-dimer she will need a stat CT of the thorax - PE protocol.  Patient has a Wells Criteria score of 0  - D-DIMER; Future    3. Dizziness  Increase fluids.  Await lab results    4. Elevated serum alkaline phosphatase level  Continue to monitor    5. SOB (shortness of breath)  See #2  - D-DIMER; Future    Any change or worsening of signs or symptoms, patient encouraged to follow-up or report to the emergency room for further evaluation. Patient understands and agrees.    Followup: Return in about 3 months (around 11/4/2017).

## 2017-08-07 NOTE — ASSESSMENT & PLAN NOTE
Patient has had an elevated alkaline phosphatase that we have been following.  She denies any jaundice or dark urine.

## 2017-08-11 ENCOUNTER — HOSPITAL ENCOUNTER (EMERGENCY)
Facility: MEDICAL CENTER | Age: 62
End: 2017-08-12
Attending: EMERGENCY MEDICINE
Payer: COMMERCIAL

## 2017-08-11 ENCOUNTER — HOSPITAL ENCOUNTER (OUTPATIENT)
Dept: RADIOLOGY | Facility: MEDICAL CENTER | Age: 62
End: 2017-08-11
Attending: FAMILY MEDICINE
Payer: COMMERCIAL

## 2017-08-11 DIAGNOSIS — R10.11 RUQ ABDOMINAL PAIN: ICD-10-CM

## 2017-08-11 DIAGNOSIS — T78.40XA ALLERGIC REACTION, INITIAL ENCOUNTER: ICD-10-CM

## 2017-08-11 PROCEDURE — A9270 NON-COVERED ITEM OR SERVICE: HCPCS | Performed by: EMERGENCY MEDICINE

## 2017-08-11 PROCEDURE — 700111 HCHG RX REV CODE 636 W/ 250 OVERRIDE (IP): Performed by: EMERGENCY MEDICINE

## 2017-08-11 PROCEDURE — 99284 EMERGENCY DEPT VISIT MOD MDM: CPT

## 2017-08-11 PROCEDURE — 76700 US EXAM ABDOM COMPLETE: CPT

## 2017-08-11 PROCEDURE — 700102 HCHG RX REV CODE 250 W/ 637 OVERRIDE(OP): Performed by: EMERGENCY MEDICINE

## 2017-08-11 RX ORDER — PREDNISONE 20 MG/1
40 TABLET ORAL DAILY
Qty: 6 TAB | Refills: 0 | Status: SHIPPED | OUTPATIENT
Start: 2017-08-11 | End: 2017-08-14

## 2017-08-11 RX ORDER — PREDNISONE 20 MG/1
40 TABLET ORAL ONCE
Status: COMPLETED | OUTPATIENT
Start: 2017-08-11 | End: 2017-08-11

## 2017-08-11 RX ORDER — EPINEPHRINE 0.3 MG/.3ML
0.3 INJECTION SUBCUTANEOUS ONCE
Qty: 0.3 ML | Refills: 0 | Status: SHIPPED | OUTPATIENT
Start: 2017-08-11 | End: 2017-08-11

## 2017-08-11 RX ORDER — DIPHENHYDRAMINE HCL 25 MG
50 TABLET ORAL ONCE
Status: COMPLETED | OUTPATIENT
Start: 2017-08-11 | End: 2017-08-11

## 2017-08-11 RX ADMIN — DIPHENHYDRAMINE HCL 50 MG: 25 TABLET ORAL at 23:00

## 2017-08-11 RX ADMIN — PREDNISONE 40 MG: 20 TABLET ORAL at 23:01

## 2017-08-11 ASSESSMENT — PAIN SCALES - GENERAL: PAINLEVEL_OUTOF10: 0

## 2017-08-11 NOTE — ED AVS SNAPSHOT
8/12/2017    Aminah Shukland  [address]  [facility] [address]    Dear Aminah:    [facility] wants to ensure your discharge home is safe and you or your loved ones have had all of your questions answered regarding your care after you leave the hospital.    Below is a list of resources and contact information should you have any questions regarding your hospital stay, follow-up instructions, or active medical symptoms.    Questions or Concerns Regarding… Contact   Medical Questions Related to Your Discharge  (7 days a week, 8am-5pm) Contact a Nurse Care Coordinator   [phone]   Medical Questions Not Related to Your Discharge  (24 hours a day / 7 days a week)  Contact the Nurse Health Line   [phone]    Medications or Discharge Instructions Refer to your discharge packet   or contact your [facility] Primary Care Provider   [phone]   Follow-up Appointment(s) Schedule your appointment via [employer]   or contact Scheduling [phone]   Billing Review your statement via [employer]  or contact Billing [phone]   Medical Records Review your records via [employer]   or contact Medical Records [phone]     You may receive a telephone call within two days of discharge. This call is to make certain you understand your discharge instructions and have the opportunity to have any questions answered. You can also easily access your medical information, test results and upcoming appointments via the [employer] free online health management tool. You can learn more and sign up at [employer]. For assistance setting up your [employer] account, please call [phone].    Once again, we want to ensure your discharge home is safe and that you have a clear understanding of any next steps in your care. If you have any questions or concerns, please do not hesitate to contact us, we are here for you. Thank you for choosing [facility] for your healthcare needs.    Sincerely,    Your [facility] Healthcare Team

## 2017-08-11 NOTE — ED AVS SNAPSHOT
Home Care Instructions                                                                                                                Aminah Maldonado   MRN: [id number]    Department:  [facility], Emergency Dept   Date of Visit:  8/11/2017            [facility], Emergency Dept    [address] [facility] [address]    Phone:  [phone]      You were seen by     Alex Wheeler M.D.      Your Diagnosis Was     Allergic reaction, initial encounter     T78.40XA       These are the medications you received during your hospitalization from 08/11/2017 2214 to 08/12/2017 0035     Date/Time Order Dose Route Action    08/11/2017 2301 predniSONE (DELTASONE) tablet 40 mg 40 mg Oral Given    08/11/2017 2300 diphenhydrAMINE (BENADRYL) tablet/capsule 50 mg 50 mg Oral Given      Follow-up Information     1. Follow up with Evi Suggs M.D..    Specialty:  Family Medicine    Contact information    [address]  [address]  [facility] [address] [phone]        Medication Information     Review all of your home medications and newly ordered medications with your primary doctor and/or pharmacist as soon as possible. Follow medication instructions as directed by your doctor and/or pharmacist.     Please keep your complete medication list with you and share with your physician. Update the information when medications are discontinued, doses are changed, or new medications (including over-the-counter products) are added; and carry medication information at all times in the event of emergency situations.               Medication List      START taking these medications        Instructions    Morning Afternoon Evening Bedtime    predniSONE 20 MG Tabs   Last time this was given:  40 mg on 8/11/2017 11:01 PM   Commonly known as:  DELTASONE        Take 2 Tabs by mouth every day for 3 days.   Dose:  40 mg                          ASK your doctor about these medications           Instructions    Morning Afternoon Evening Bedtime    cetirizine 10 MG Tabs   Commonly known as:  ZYRTEC ALLERGY        Take 1 Tab by mouth every day.   Dose:  10 mg                        fluticasone 50 MCG/ACT nasal spray   Commonly known as:  FLONASE        Spray 1 Spray in nose every day.   Dose:  1 Spray                             Where to Get Your Medications      These medications were sent to [id number] - [place], [place] - [address] AT [facility]  [address] [facility], [place] [address]     Phone:  [phone]    - predniSONE 20 MG Tabs              Discharge Instructions       Anaphylactic Reaction  An anaphylactic reaction is a sudden, severe allergic reaction that involves the whole body. It can be life threatening. A hospital stay is often required. People with asthma, eczema, or hay fever are slightly more likely to have an anaphylactic reaction.  CAUSES   An anaphylactic reaction may be caused by anything to which you are allergic. After being exposed to the allergic substance, your immune system becomes sensitized to it. When you are exposed to that allergic substance again, an allergic reaction can occur. Common causes of an anaphylactic reaction include:  · Medicines.  · Foods, especially peanuts, wheat, shellfish, milk, and eggs.  · Insect bites or stings.  · Blood products.  · Chemicals, such as dyes, latex, and contrast material used for imaging tests.  SYMPTOMS   When an allergic reaction occurs, the body releases histamine and other substances. These substances cause symptoms such as tightening of the airway. Symptoms often develop within seconds or minutes of exposure. Symptoms may include:  · Skin rash or hives.  · Itching.  · Chest tightness.  · Swelling of the eyes, tongue, or lips.  · Trouble breathing or swallowing.  · Lightheadedness or fainting.  · Anxiety or confusion.  · Stomach pains, vomiting, or diarrhea.  · Nasal congestion.  · A fast or irregular  heartbeat (palpitations).  DIAGNOSIS   Diagnosis is based on your history of recent exposure to allergic substances, your symptoms, and a physical exam. Your caregiver may also perform blood or urine tests to confirm the diagnosis.  TREATMENT   Epinephrine medicine is the main treatment for an anaphylactic reaction. Other medicines that may be used for treatment include antihistamines, steroids, and albuterol. In severe cases, fluids and medicine to support blood pressure may be given through an intravenous line (IV). Even if you improve after treatment, you need to be observed to make sure your condition does not get worse. This may require a stay in the hospital.  HOME CARE INSTRUCTIONS   · Wear a medical alert bracelet or necklace stating your allergy.  · You and your family must learn how to use an anaphylaxis kit or give an epinephrine injection to temporarily treat an emergency allergic reaction. Always carry your epinephrine injection or anaphylaxis kit with you. This can be lifesaving if you have a severe reaction.  · Do not drive or perform tasks after treatment until the medicines used to treat your reaction have worn off, or until your caregiver says it is okay.  · If you have hives or a rash:  ¨ Take medicines as directed by your caregiver.  ¨ You may use an over-the-counter antihistamine (diphenhydramine) as needed.  ¨ Apply cold compresses to the skin or take baths in cool water. Avoid hot baths or showers.  SEEK MEDICAL CARE IF:   · You develop symptoms of an allergic reaction to a new substance. Symptoms may start right away or minutes later.  · You develop a rash, hives, or itching.  · You develop new symptoms.  SEEK IMMEDIATE MEDICAL CARE IF:   · You have swelling of the mouth, difficulty breathing, or wheezing.  · You have a tight feeling in your chest or throat.  · You develop hives, swelling, or itching all over your body.  · You develop severe vomiting or diarrhea.  · You feel faint or pass  out.  This is an emergency. Use your epinephrine injection or anaphylaxis kit as you have been instructed. Call your local emergency services (911 in [place] Even if you improve after the injection, you need to be examined at a hospital emergency department.  MAKE SURE YOU:   · Understand these instructions.  · Will watch your condition.  · Will get help right away if you are not doing well or get worse.     This information is not intended to replace advice given to you by your health care provider. Make sure you discuss any questions you have with your health care provider.     Document Released: 12/18/2006 Document Revised: 12/23/2014 Document Reviewed: 03/20/2013  [employer] Interactive Patient Education ©2016 [employer]            Patient Information     Patient Information    Following emergency treatment: all patient requiring follow-up care must return either to a private physician or a clinic if your condition worsens before you are able to obtain further medical attention, please return to the emergency room.     Billing Information    At [facility], we work to make the billing process streamlined for our patients.  Our Representatives are here to answer any questions you may have regarding your hospital bill.  If you have insurance coverage and have supplied your insurance information to us, we will submit a claim to your insurer on your behalf.  Should you have any questions regarding your bill, we can be reached online or by phone as follows:  Online: You are able pay your bills online or live chat with our representatives about any billing questions you may have. We are here to help Monday - Friday from 8:00am to 7:30pm and 9:00am - 12:00pm on Saturdays.  Please visit [email or web address]  for more information.   Phone:  [phone] or [phone]    Please note that your emergency physician, surgeon, pathologist, radiologist, anesthesiologist, and other  specialists are not employed by [facility] and will therefore bill separately for their services.  Please contact them directly for any questions concerning their bills at the numbers below:     Emergency Physician Services:  [phone]  [facility]:  [phone]  [facility]:  [phone]  [facility]:  [phone]    1. Your final bill may vary from the amount quoted upon discharge if all procedures are not complete at that time, or if your doctor has additional procedures of which we are not aware. You will receive an additional bill if you return to the Emergency Department at [facility] for suture removal regardless of the facility of which the sutures were placed.     2. Please arrange for settlement of this account at the emergency registration.    3. All self-pay accounts are due in full at the time of treatment.  If you are unable to meet this obligation then payment is expected within 4-5 days.     4. If you have had radiology studies (CT, X-ray, Ultrasound, MRI), you have received a preliminary result during your emergency department visit. Please contact the radiology department [phone] to receive a copy of your final result. Please discuss the Final result with your primary physician or with the follow up physician provided.     Crisis Hotline:  [place] Crisis Hotline:  3-224-YBSJWIP or 1-503.447.3665  [place] Crisis Hotline:    1-109.887.1841 or 571-327-5895         ED Discharge Follow Up Questions    1. In order to provide you with very good care, we would like to follow up with a phone call in the next few days.  May we have your permission to contact you?     YES /  NO    2. What is the best phone number to call you? (       )_____-__________    3. What is the best time to call you?      Morning  /  Afternoon  /  Evening                   Patient Signature:  ____________________________________________________________    Date:   ____________________________________________________________

## 2017-08-11 NOTE — ED AVS SNAPSHOT
Cloudy Days Access Code: Activation code not generated  Current Cloudy Days Status: Active    Savedailyhart  A secure, online tool to manage your health information     [employer]’s Cloudy Days® is a secure, online tool that connects you to your personalized health information from the privacy of your home -- day or night - making it very easy for you to manage your healthcare. Once the activation process is completed, you can even access your medical information using the Cloudy Days azul, which is available for free in the Apple Azul store or Google Play store.     Cloudy Days provides the following levels of access (as shown below):   My Chart Features   [facility] Primary Care Doctor [facility]  Specialists [facility]  Urgent  Care Non-[facility]  Primary Care  Doctor   Email your healthcare team securely and privately 24/7 X X X X   Manage appointments: schedule your next appointment; view details of past/upcoming appointments X      Request prescription refills. X      View recent personal medical records, including lab and immunizations X X X X   View health record, including health history, allergies, medications X X X X   Read reports about your outpatient visits, procedures, consult and ER notes X X X X   See your discharge summary, which is a recap of your hospital and/or ER visit that includes your diagnosis, lab results, and care plan. X X       How to register for Cloudy Days:  1. Go to  [email or web address].  2. Click on the Sign Up Now box, which takes you to the New Member Sign Up page. You will need to provide the following information:  a. Enter your Cloudy Days Access Code exactly as it appears at the top of this page. (You will not need to use this code after you’ve completed the sign-up process. If you do not sign up before the expiration date, you must request a new code.)   b. Enter your date of birth.   c. Enter your home email address.   d. Click Submit, and follow the next screen’s instructions.  3. Create a Cloudy Days ID. This will  be your Acopio login ID and cannot be changed, so think of one that is secure and easy to remember.  4. Create a Acopio password. You can change your password at any time.  5. Enter your Password Reset Question and Answer. This can be used at a later time if you forget your password.   6. Enter your e-mail address. This allows you to receive e-mail notifications when new information is available in Acopio.  7. Click Sign Up. You can now view your health information.    For assistance activating your Acopio account, call [phone]

## 2017-08-12 VITALS
OXYGEN SATURATION: 94 % | HEIGHT: 60 IN | RESPIRATION RATE: 16 BRPM | TEMPERATURE: 98.7 F | WEIGHT: 110.23 LBS | HEART RATE: 77 BPM | BODY MASS INDEX: 21.64 KG/M2 | DIASTOLIC BLOOD PRESSURE: 66 MMHG | SYSTOLIC BLOOD PRESSURE: 110 MMHG

## 2017-08-12 PROCEDURE — 99284 EMERGENCY DEPT VISIT MOD MDM: CPT

## 2017-08-12 NOTE — ED NOTES
ERP at bedside. Pt agrees with plan of care discussed by ERP. AIDET acknowledged with patient. Casimiro in low position, side rail up for pt safety. Call light within reach. Will continue to monitor.

## 2017-08-12 NOTE — ED NOTES
Pt still has some swelling of lips- instructed to keep taking benadryl and prednisone.  (RX sent to her pharmacy) Pt given discharge instructions; verbalized understanding of instructions.  Ambulated out with friend

## 2017-08-12 NOTE — ED NOTES
Presents with an obvious allergic reaction of unknown etiology for the past hour.  Pt if referred to us  by Urgent Care after administration of Zyrtec.  Denies respiratory distress.

## 2017-08-12 NOTE — ED PROVIDER NOTES
ED Provider Note    ED Provider Note    Primary care provider: Evi Suggs M.D.  Means of arrival: POV  History obtained from: Patient    CHIEF COMPLAINT  Chief Complaint   Patient presents with   • Allergic Reaction     Seen at 10:50 PM.   HPI  Aminah Maldonado is a 61 y.o. female who presents to the Emergency Department redness, itching and burning of the eyes along with swelling of the lips. She states the symptoms began approximately 45 minutes ago. She was seen in urgent care and given a dose of Zyrtec. Since that time she believes the swelling has slightly improved. She was referred here for additional management.    She notes initially her throat felt tight though this has resolved. She did report a similar episode approximately one month ago that only involved the eyes. She does not know of any specific allergies. These symptoms occurred when she was walking around outside, so she does believe it is environmental in nature. She denies any new lotions, detergents or medications.    REVIEW OF SYSTEMS  See HPI,  she denies chest pain, shortness of breath, nausea, vomiting or rash.    PAST MEDICAL HISTORY   has a past medical history of Endometriosis.    SURGICAL HISTORY   has past surgical history that includes appendectomy; abdominal hysterectomy total; and tonsillectomy and adenoidectomy.    SOCIAL HISTORY  Social History   Substance Use Topics   • Smoking status: Never Smoker    • Smokeless tobacco: Never Used   • Alcohol Use: No      History   Drug Use No       FAMILY HISTORY  Family History   Problem Relation Age of Onset   • Heart Disease Mother    • Lung Disease Father    • Cancer Father 81     lung   • Diabetes Father    • Lung Disease Sister    • Cancer Sister 55     lung   • Diabetes Brother    • Kidney Disease Brother 49     on dialysis       CURRENT MEDICATIONS  Reviewed.  See Encounter Summary.     ALLERGIES  No Known Allergies    PHYSICAL EXAM  VITAL SIGNS: /66 mmHg  Pulse 77   Temp(Src) 37.1 °C (98.7 °F)  Resp 16  Ht 1.524 m (5')  Wt 50 kg (110 lb 3.7 oz)  BMI 21.53 kg/m2  SpO2 94%  Constitutional: Awake, alert in no apparent distress.  HENT: Normocephalic, Bilateral external ears normal. Nose normal. Bilateral conjunctival injection. PERRLA. Upper and lower lip have mild edema, there is no tongue swelling or pharyngeal swelling. The neck is supple.  Eyes: Conjunctiva normal, non-icteric, EOMI.    Thorax & Lungs: Easy unlabored respirations, Clear to ascultation bilaterally. No wheezing.  Cardiovascular: Regular rate, Regular rhythm, No murmurs, rubs or gallops.  Abdomen:  :    Skin: Visualized skin is  Dry, No erythema, No rash.   Musculoskeletal:   No cyanosis, clubbing or edema.  Neurologic: Alert, Grossly non-focal.   Psychiatric: Normal affect, Normal mood          RADIOLOGY  No orders to display     The radiologist's interpretation of all radiological studies have been reviewed by me.    COURSE & MEDICAL DECISION MAKING  Pertinent Labs & Imaging studies reviewed. (See chart for details)        10:50 PM - Patient seen and examined at bedside. She'll be given prednisone and Benadryl.     12:30 AM - significant improvement in swelling.      Decision Making:  This is a 61 y.o. year old female who presents with probable environmental allergy. She does have swelling of the lips, the patient feels that the symptoms have improved somewhat. The presentation is not consistent with angioedema as the patient has eye involvement and itching as well.  She does not have any symptoms of anaphylaxis at this time.    The patient was observed and did not note any progression. She will be discharged with a prednisone burst as well as an EpiPen.      Filed Vitals:    08/11/17 2224 08/11/17 2226 08/12/17 0036 08/12/17 0037   BP:  94/60  110/66   Pulse:  78 77 77   Temp:  37.1 °C (98.7 °F)     Resp:  18  16   Height: 1.524 m (5')      Weight: 50 kg (110 lb 3.7 oz)      SpO2:  98% 94%             Discharge Medications:  Discharge Medication List as of 8/12/2017 12:35 AM      START taking these medications    Details   predniSONE (DELTASONE) 20 MG Tab Take 2 Tabs by mouth every day for 3 days., Disp-6 Tab, R-0, Normal             The patient is discharged home in good condition.    FINAL IMPRESSION  1. Allergic reaction, initial encounter

## 2017-08-12 NOTE — DISCHARGE INSTRUCTIONS
Anaphylactic Reaction  An anaphylactic reaction is a sudden, severe allergic reaction that involves the whole body. It can be life threatening. A hospital stay is often required. People with asthma, eczema, or hay fever are slightly more likely to have an anaphylactic reaction.  CAUSES   An anaphylactic reaction may be caused by anything to which you are allergic. After being exposed to the allergic substance, your immune system becomes sensitized to it. When you are exposed to that allergic substance again, an allergic reaction can occur. Common causes of an anaphylactic reaction include:  · Medicines.  · Foods, especially peanuts, wheat, shellfish, milk, and eggs.  · Insect bites or stings.  · Blood products.  · Chemicals, such as dyes, latex, and contrast material used for imaging tests.  SYMPTOMS   When an allergic reaction occurs, the body releases histamine and other substances. These substances cause symptoms such as tightening of the airway. Symptoms often develop within seconds or minutes of exposure. Symptoms may include:  · Skin rash or hives.  · Itching.  · Chest tightness.  · Swelling of the eyes, tongue, or lips.  · Trouble breathing or swallowing.  · Lightheadedness or fainting.  · Anxiety or confusion.  · Stomach pains, vomiting, or diarrhea.  · Nasal congestion.  · A fast or irregular heartbeat (palpitations).  DIAGNOSIS   Diagnosis is based on your history of recent exposure to allergic substances, your symptoms, and a physical exam. Your caregiver may also perform blood or urine tests to confirm the diagnosis.  TREATMENT   Epinephrine medicine is the main treatment for an anaphylactic reaction. Other medicines that may be used for treatment include antihistamines, steroids, and albuterol. In severe cases, fluids and medicine to support blood pressure may be given through an intravenous line (IV). Even if you improve after treatment, you need to be observed to make sure your condition does not get  worse. This may require a stay in the hospital.  HOME CARE INSTRUCTIONS   · Wear a medical alert bracelet or necklace stating your allergy.  · You and your family must learn how to use an anaphylaxis kit or give an epinephrine injection to temporarily treat an emergency allergic reaction. Always carry your epinephrine injection or anaphylaxis kit with you. This can be lifesaving if you have a severe reaction.  · Do not drive or perform tasks after treatment until the medicines used to treat your reaction have worn off, or until your caregiver says it is okay.  · If you have hives or a rash:  ¨ Take medicines as directed by your caregiver.  ¨ You may use an over-the-counter antihistamine (diphenhydramine) as needed.  ¨ Apply cold compresses to the skin or take baths in cool water. Avoid hot baths or showers.  SEEK MEDICAL CARE IF:   · You develop symptoms of an allergic reaction to a new substance. Symptoms may start right away or minutes later.  · You develop a rash, hives, or itching.  · You develop new symptoms.  SEEK IMMEDIATE MEDICAL CARE IF:   · You have swelling of the mouth, difficulty breathing, or wheezing.  · You have a tight feeling in your chest or throat.  · You develop hives, swelling, or itching all over your body.  · You develop severe vomiting or diarrhea.  · You feel faint or pass out.  This is an emergency. Use your epinephrine injection or anaphylaxis kit as you have been instructed. Call your local emergency services (911 in [place] Even if you improve after the injection, you need to be examined at a hospital emergency department.  MAKE SURE YOU:   · Understand these instructions.  · Will watch your condition.  · Will get help right away if you are not doing well or get worse.     This information is not intended to replace advice given to you by your health care provider. Make sure you discuss any questions you have with your health care provider.     Document Released: 12/18/2006 Document  Revised: 12/23/2014 Document Reviewed: 03/20/2013  Elsevier Interactive Patient Education ©2016 [employer] Inc.

## 2017-08-17 ENCOUNTER — PATIENT MESSAGE (OUTPATIENT)
Dept: MEDICAL GROUP | Facility: MEDICAL CENTER | Age: 62
End: 2017-08-17

## 2017-08-17 NOTE — TELEPHONE ENCOUNTER
From: Aminah Maldonado  To: Evi Suggs M.D.  Sent: 8/17/2017 10:03 AM PDT  Subject: Non-Urgent Medical Question    Dr. Suggs,  I message you earlier in the week and haven't heard from you. My symptons have gotten worse. I went to er last Friday night. My pain on my right side is constant and have a deep chest cough which is all the time. I need further testing asap to figure out what is causing this.    Thanks for your immediate attention.    Aminah Maldonado

## 2017-08-17 NOTE — TELEPHONE ENCOUNTER
Regarding: FW: Non-Urgent Medical Question  Contact: [phone]  Will you put her in today or tomorrow.  Evi Suggs M.D.    ----- Message -----     From: Aminah Maldonado     Sent: 8/17/2017  10:16 AM       To: Evi Suggs M.D.  Subject: Non-Urgent Medical Question                      ----- Message from Benito Aguila Ass't sent at 8/17/2017 10:16 AM PDT -----       ----- Message from Aminah Maldonado to Evi Suggs M.D. sent at 8/17/2017 10:03 AM -----   Dr. Suggs,  I message you earlier in the week and haven't heard from you. My symptons have gotten worse. I went to er last Friday night. My pain on my right side is constant and have a deep chest cough which is all the time.  I need further testing asap to figure out what is causing this.    Thanks for your immediate attention.    Aminah Maldonado

## 2017-08-28 ENCOUNTER — HOSPITAL ENCOUNTER (OUTPATIENT)
Dept: RADIOLOGY | Facility: MEDICAL CENTER | Age: 62
End: 2017-08-28
Attending: FAMILY MEDICINE
Payer: COMMERCIAL

## 2017-08-28 ENCOUNTER — OFFICE VISIT (OUTPATIENT)
Dept: MEDICAL GROUP | Facility: MEDICAL CENTER | Age: 62
End: 2017-08-28
Payer: COMMERCIAL

## 2017-08-28 VITALS
DIASTOLIC BLOOD PRESSURE: 72 MMHG | TEMPERATURE: 98.2 F | OXYGEN SATURATION: 98 % | SYSTOLIC BLOOD PRESSURE: 120 MMHG | HEART RATE: 67 BPM | WEIGHT: 107 LBS | BODY MASS INDEX: 21.01 KG/M2 | HEIGHT: 60 IN

## 2017-08-28 DIAGNOSIS — R74.8 ELEVATED SERUM ALKALINE PHOSPHATASE LEVEL: ICD-10-CM

## 2017-08-28 DIAGNOSIS — M54.14 RADICULAR PAIN OF THORACIC REGION: ICD-10-CM

## 2017-08-28 DIAGNOSIS — R10.11 RUQ ABDOMINAL PAIN: ICD-10-CM

## 2017-08-28 DIAGNOSIS — R05.9 COUGH: ICD-10-CM

## 2017-08-28 PROCEDURE — 71020 DX-CHEST-2 VIEWS: CPT

## 2017-08-28 PROCEDURE — 72072 X-RAY EXAM THORAC SPINE 3VWS: CPT

## 2017-08-28 PROCEDURE — 99215 OFFICE O/P EST HI 40 MIN: CPT | Performed by: FAMILY MEDICINE

## 2017-08-28 NOTE — ASSESSMENT & PLAN NOTE
"\"Pulsatile pain\" that is sometimes is sharp and is sometimes burning.  Pain is worse first thing in the morning but does not wake her up.  Pain waxes and wanes but does not seem to be aggrevated by anything.  It is making her more fatigued. She had a negative abdominal ultrasound. She is scheduled for an endoscopy with gastroenterology tomorrow. She is feeling very frustrated because the symptoms are not improving. We did discuss that this could be radicular pain from her thoracic spine. Patient is convinced this is not so she is sure that it's something to do with her abdominal organs. She denies any back pain. She has had elevated serum alkaline phosphatase but the rest of her labs have been normal. She denies any nausea or vomiting. She has had an occasional cough with this and that seems to make the pain worse. She denies any black or bloody stools. She does not think that it worsens with fatty meals.  "

## 2017-08-31 NOTE — PROGRESS NOTES
"Subjective:     Chief Complaint   Patient presents with   • Abdominal Pain     right side       Aminah Maldonado is a 62 y.o. female here today for evaluation and management of:    RUQ abdominal pain  \"Pulsatile pain\" that is sometimes is sharp and is sometimes burning.  Pain is worse first thing in the morning but does not wake her up.  Pain waxes and wanes but does not seem to be aggrevated by anything.  It is making her more fatigued. She had a negative abdominal ultrasound. She is scheduled for an endoscopy with gastroenterology tomorrow. She is feeling very frustrated because the symptoms are not improving. We did discuss that this could be radicular pain from her thoracic spine. Patient is convinced this is not so she is sure that it's something to do with her abdominal organs. She denies any back pain. She has had elevated serum alkaline phosphatase but the rest of her labs have been normal. She denies any nausea or vomiting. She has had an occasional cough with this and that seems to make the pain worse. She denies any black or bloody stools. She does not think that it worsens with fatty meals.       No Known Allergies    Current medicines (including changes today)  Current Outpatient Prescriptions   Medication Sig Dispense Refill   • fluticasone (FLONASE) 50 MCG/ACT nasal spray Spray 1 Spray in nose every day. 16 g 0   • cetirizine (ZYRTEC ALLERGY) 10 MG Tab Take 1 Tab by mouth every day. 30 Tab 0     No current facility-administered medications for this visit.        She  has a past medical history of Endometriosis.    Patient Active Problem List    Diagnosis Date Noted   • Radicular pain of thoracic region 08/28/2017   • RUQ abdominal pain 08/04/2017   • Cough 08/04/2017   • Dizziness 08/04/2017   • SOB (shortness of breath) 08/04/2017   • Uncontrolled daytime somnolence 05/26/2017   • Elevated serum alkaline phosphatase level 05/26/2017   • Macrocytosis without anemia 05/26/2017   • Acute non-recurrent " maxillary sinusitis 02/09/2017   • Fatigue 02/09/2017       ROS   No fever or chills.  No nausea or vomiting.  No chest pain or palpitations.  No cough or SOB.  No pain with urination or hematuria.  No black or bloody stools.       Objective:     Blood pressure 120/72, pulse 67, temperature 36.8 °C (98.2 °F), height 1.524 m (5'), weight 48.5 kg (107 lb), SpO2 98 %. Body mass index is 20.9 kg/m².   Physical Exam:  Well developed, well nourished.  Alert, oriented in Mild acute distress.  Eye contact is good, speech goal directed, affect anxious  Eyes: conjunctiva non-injected, sclera non-icteric.  Ears: Pinna normal. TM pearly gray.   Nose: Nares are patent.  Normal mucosa  Mouth: Oral mucous membranes pink and moist with no lesions.  Neck Supple.  No adenopathy or masses in the neck or supraclavicular regions. No thyromegaly  Lungs: clear to auscultation bilaterally with good excursion. No wheezes or rhonchi  CV: regular rate and rhythm. No murmur  Abdomen: soft, mild right upper quadrant tenderness, no masses or organomegaly.  No rebound or guarding  Ext: no edema, color normal, vascularity normal, temperature normal  SPINE: No significant spinal curvature on forward bend. Mild tenderness in paraspinous muscles lumbar spine without current spasm. SLT negative. DTR 2+ patella, 1+ achilles bilaterally. Strength 5/5 proximal and distal LE.  No pain with stress of SI. Full hip ROM. Poor hamstring flexibility. No symptoms with axial loading.           Assessment and Plan:   The following treatment plan was discussed    1. RUQ abdominal pain  We discussed that her ultrasound is negative as have her labs then. I've advised her to proceed with the endoscopy and then discussed with the gastroenterologist if he biked proceed with a CT of the abdomen and pelvis or perhaps a HIDA scan. I did discuss at length that this could be radicular pain from the thoracic spine and discussed the reasoning for getting an x-ray of the area.  Patient is very resistant to this idea. We also discussed of the possibility of diaphragmatic irritation and referred pain. We'll get a chest x-ray to assess. Await the results.  - DX-CHEST-2 VIEWS; Future    2. Elevated serum alkaline phosphatase level  Uncertain etiology. Level has been stable  - DX-THORACIC SPINE-WITH SWIMMERS VIEW; Future    3. Cough  Nonproductive. Check a chest x-ray  - DX-CHEST-2 VIEWS; Future    4. Radicular pain of thoracic region  I showed the patient the dermatomes and how the nerves coming out of the spinal cord. She is agreed to get an x-ray to assess. Await results. Consider MRI if abnormal.  - DX-THORACIC SPINE-WITH SWIMMERS VIEW; Future      Spent 45 minutes in face-to-face patient contact in which greater than 50% of the visit was spent in counseling/coordination of care     Any change or worsening of signs or symptoms, patient encouraged to follow-up or report to the emergency room for further evaluation. Patient understands and agrees.    Followup: Return in about 4 weeks (around 9/25/2017).

## 2017-09-05 ENCOUNTER — TELEPHONE (OUTPATIENT)
Dept: MEDICAL GROUP | Facility: MEDICAL CENTER | Age: 62
End: 2017-09-05

## 2017-09-05 DIAGNOSIS — M51.34 DEGENERATIVE DISC DISEASE, THORACIC: ICD-10-CM

## 2017-09-05 DIAGNOSIS — M54.14 RADICULAR PAIN OF THORACIC REGION: ICD-10-CM

## 2017-09-05 NOTE — TELEPHONE ENCOUNTER
Patient sent my chart message stating you recommended an MRI of her spine and she would like her the order please.

## 2017-09-14 ENCOUNTER — HOSPITAL ENCOUNTER (OUTPATIENT)
Dept: RADIOLOGY | Facility: MEDICAL CENTER | Age: 62
End: 2017-09-14
Attending: FAMILY MEDICINE
Payer: COMMERCIAL

## 2017-09-14 DIAGNOSIS — M54.14 RADICULAR PAIN OF THORACIC REGION: ICD-10-CM

## 2017-09-14 DIAGNOSIS — M51.34 DEGENERATIVE DISC DISEASE, THORACIC: ICD-10-CM

## 2017-09-14 PROCEDURE — 72146 MRI CHEST SPINE W/O DYE: CPT

## 2017-09-15 ENCOUNTER — TELEPHONE (OUTPATIENT)
Dept: MEDICAL GROUP | Facility: MEDICAL CENTER | Age: 62
End: 2017-09-15

## 2017-09-15 NOTE — TELEPHONE ENCOUNTER
----- Message from RAFY Onofre sent at 9/15/2017  2:20 PM PDT -----  Please notify patient that the MRI of her thoracic spine showed degenerative disc disease, worse in the thoracic spine. There was no disc herniation noted. If she would like to discuss these results further, have her make an appointment with Dr. Suggs.    RAFY Onofre

## 2017-09-21 ENCOUNTER — OFFICE VISIT (OUTPATIENT)
Dept: MEDICAL GROUP | Facility: MEDICAL CENTER | Age: 62
End: 2017-09-21
Payer: COMMERCIAL

## 2017-09-21 VITALS
HEIGHT: 60 IN | WEIGHT: 108 LBS | RESPIRATION RATE: 16 BRPM | OXYGEN SATURATION: 98 % | DIASTOLIC BLOOD PRESSURE: 62 MMHG | SYSTOLIC BLOOD PRESSURE: 102 MMHG | BODY MASS INDEX: 21.2 KG/M2 | TEMPERATURE: 97.4 F | HEART RATE: 70 BPM

## 2017-09-21 DIAGNOSIS — M54.14 RADICULAR PAIN OF THORACIC REGION: ICD-10-CM

## 2017-09-21 DIAGNOSIS — M51.34 DDD (DEGENERATIVE DISC DISEASE), THORACIC: ICD-10-CM

## 2017-09-21 DIAGNOSIS — I95.0 IDIOPATHIC HYPOTENSION: ICD-10-CM

## 2017-09-21 PROCEDURE — 99214 OFFICE O/P EST MOD 30 MIN: CPT | Performed by: FAMILY MEDICINE

## 2017-09-21 NOTE — PATIENT INSTRUCTIONS
Hypotension  As your heart beats, it forces blood through your arteries. This force is your blood pressure. If your blood pressure is too low for you to go about your normal activities or to support the organs of your body, you have hypotension. Hypotension is also referred to as low blood pressure. When your blood pressure becomes too low, you may not get enough blood to your brain. As a result, you may feel weak, feel lightheaded, or develop a rapid heart rate. In a more severe case, you may faint.  CAUSES  Various conditions can cause hypotension. These include:  · Blood loss.  · Dehydration.  · Heart or endocrine problems.  · Pregnancy.  · Severe infection.  · Not having a well-balanced diet filled with needed nutrients.  · Severe allergic reactions (anaphylaxis).  Some medicines, such as blood pressure medicine or water pills (diuretics), may lower your blood pressure below normal. Sometimes taking too much medicine or taking medicine not as directed can cause hypotension.  TREATMENT   Hospitalization is sometimes required for hypotension if fluid or blood replacement is needed, if time is needed for medicines to wear off, or if further monitoring is needed. Treatment might include changing your diet, changing your medicines (including medicines aimed at raising your blood pressure), and use of support stockings.  HOME CARE INSTRUCTIONS   · Drink enough fluids to keep your urine clear or pale yellow.  · Take your medicines as directed by your health care provider.  · Get up slowly from reclining or sitting positions. This gives your blood pressure a chance to adjust.  · Wear support stockings as directed by your health care provider.  · Maintain a healthy diet by including nutritious food, such as fruits, vegetables, nuts, whole grains, and lean meats.  SEEK MEDICAL CARE IF:  · You have vomiting or diarrhea.  · You have a fever for more than 2-3 days.  · You feel more thirsty than usual.  · You feel weak and  tired.  SEEK IMMEDIATE MEDICAL CARE IF:   · You have chest pain or a fast or irregular heartbeat.  · You have a loss of feeling in some part of your body, or you lose movement in your arms or legs.  · You have trouble speaking.  · You become sweaty or feel lightheaded.  · You faint.  MAKE SURE YOU:   · Understand these instructions.  · Will watch your condition.  · Will get help right away if you are not doing well or get worse.     This information is not intended to replace advice given to you by your health care provider. Make sure you discuss any questions you have with your health care provider.     Document Released: 12/18/2006 Document Revised: 10/08/2014 Document Reviewed: 06/20/2014  [employer] Interactive Patient Education ©2016 [employer]

## 2017-09-27 NOTE — ASSESSMENT & PLAN NOTE
Patient has been having right upper quadrant abdominal pain and right lower chest pain for quite some time. She's had an extensive GI workup that has been negative. X-ray of her spine showed degenerative changes. An MRI showed the following:  Thoracic kyphosis and multilevel degenerative disc disease, most pronounced in the mid thoracic spine.  Patient continues to have the pain and would like to know what the next step is. She denies any numbness or tingling. No loss of strength.

## 2017-09-27 NOTE — PROGRESS NOTES
Subjective:     Chief Complaint   Patient presents with   • Results     MRI       Aminah Maldonado is a 62 y.o. female here today for evaluation and management of:    Radicular pain of thoracic region  Patient has been having right upper quadrant abdominal pain and right lower chest pain for quite some time. She's had an extensive GI workup that has been negative. X-ray of her spine showed degenerative changes. An MRI showed the following:  Thoracic kyphosis and multilevel degenerative disc disease, most pronounced in the mid thoracic spine.  Patient continues to have the pain and would like to know what the next step is. She denies any numbness or tingling. No loss of strength.    Idiopathic hypotension  Patient is having hypotensive episodes. She reports that when she first gets up in the morning she feels a little lightheaded and it worsens when she takes a hot shower. She presents after showered she so exhausted she needs to lay back down in bed. She hasn't been checking her blood pressures in the morning. She is on his had a problem with low blood pressure. She denies any syncopal episodes. No seizure-like activity.       No Known Allergies    Current medicines (including changes today)  Current Outpatient Prescriptions   Medication Sig Dispense Refill   • fluticasone (FLONASE) 50 MCG/ACT nasal spray Spray 1 Spray in nose every day. 16 g 0   • cetirizine (ZYRTEC ALLERGY) 10 MG Tab Take 1 Tab by mouth every day. 30 Tab 0     No current facility-administered medications for this visit.        She  has a past medical history of Endometriosis.    Patient Active Problem List    Diagnosis Date Noted   • Idiopathic hypotension 09/21/2017   • DDD (degenerative disc disease), thoracic 09/21/2017   • Radicular pain of thoracic region 08/28/2017   • RUQ abdominal pain 08/04/2017   • Cough 08/04/2017   • Dizziness 08/04/2017   • SOB (shortness of breath) 08/04/2017   • Uncontrolled daytime somnolence 05/26/2017   •  Elevated serum alkaline phosphatase level 05/26/2017   • Macrocytosis without anemia 05/26/2017   • Acute non-recurrent maxillary sinusitis 02/09/2017   • Fatigue 02/09/2017       ROS   No fever or chills.  No nausea or vomiting.  No chest pain or palpitations.  No cough or SOB.  No pain with urination or hematuria.  No black or bloody stools.       Objective:     Blood pressure 102/62, pulse 70, temperature 36.3 °C (97.4 °F), resp. rate 16, height 1.524 m (5'), weight 49 kg (108 lb), SpO2 98 %. Body mass index is 21.09 kg/m².   Physical Exam:  Well developed, well nourished.  Alert, oriented in no acute distress.  Eye contact is good, speech goal directed, affect calm  Eyes: conjunctiva non-injected, sclera non-icteric.PERRL. EOMs intact  Ears: Pinna normal. TM pearly gray.   Nose: Nares are patent.  Normal mucosa  Mouth: Oral mucous membranes pink and moist with no lesions.  Neck Supple.  No adenopathy or masses in the neck or supraclavicular regions. No thyromegaly  Lungs: clear to auscultation bilaterally with good excursion. No wheezes or rhonchi  CV: regular rate and rhythm. No murmur  SPINE: No significant spinal curvature on forward bend. Mild tenderness in paraspinous muscles thoracic spine without current spasm. SLT negative. DTR 2+ patella, 1+ achilles bilaterally. Strength 5/5 proximal and distal LE.  No pain with stress of SI. Full hip ROM. Poor hamstring flexibility. No symptoms with axial loading.       Assessment and Plan:   The following treatment plan was discussed    1. Idiopathic hypotension  Refer to cardiology. We did discuss strategies such as increasing the sodium in her diet. I also discussed vasodilation with a hot shower. Recommend that she gets up and moves around before she takes her shower. Discussed fall prevention  - REFERRAL TO CARDIOLOGY    2. Radicular pain of thoracic region  Refer to physiatry  - REFERRAL TO PHYSIATRY (PMR)    3. DDD (degenerative disc disease), thoracic  Refer  to physiatry  - REFERRAL TO PHYSIATRY (PMR)    Any change or worsening of signs or symptoms, patient encouraged to follow-up or report to the emergency room for further evaluation. Patient understands and agrees.    Followup: Return if symptoms worsen or fail to improve.

## 2017-09-27 NOTE — ASSESSMENT & PLAN NOTE
Patient is having hypotensive episodes. She reports that when she first gets up in the morning she feels a little lightheaded and it worsens when she takes a hot shower. She presents after showered she so exhausted she needs to lay back down in bed. She hasn't been checking her blood pressures in the morning. She is on his had a problem with low blood pressure. She denies any syncopal episodes. No seizure-like activity.

## 2017-11-03 ENCOUNTER — OFFICE VISIT (OUTPATIENT)
Dept: CARDIOLOGY | Facility: MEDICAL CENTER | Age: 62
End: 2017-11-03
Payer: COMMERCIAL

## 2017-11-03 VITALS
OXYGEN SATURATION: 95 % | WEIGHT: 110 LBS | HEART RATE: 92 BPM | HEIGHT: 60 IN | BODY MASS INDEX: 21.6 KG/M2 | SYSTOLIC BLOOD PRESSURE: 106 MMHG | DIASTOLIC BLOOD PRESSURE: 68 MMHG

## 2017-11-03 DIAGNOSIS — R53.83 FATIGUE, UNSPECIFIED TYPE: ICD-10-CM

## 2017-11-03 PROCEDURE — 93000 ELECTROCARDIOGRAM COMPLETE: CPT | Performed by: INTERNAL MEDICINE

## 2017-11-03 PROCEDURE — 99203 OFFICE O/P NEW LOW 30 MIN: CPT | Performed by: INTERNAL MEDICINE

## 2017-11-03 ASSESSMENT — ENCOUNTER SYMPTOMS
NERVOUS/ANXIOUS: 0
NAUSEA: 0
PND: 0
EYE DISCHARGE: 0
SHORTNESS OF BREATH: 0
PALPITATIONS: 0
COUGH: 0
HEADACHES: 0
HEARTBURN: 0
MYALGIAS: 0
BRUISES/BLEEDS EASILY: 0
CHILLS: 0
FEVER: 0
BLURRED VISION: 0
DEPRESSION: 0
DIZZINESS: 1

## 2017-11-03 NOTE — PROGRESS NOTES
Subjective:   Aminah Maldonado is a 62 y.o. female who presents todayIn consultation at the request of Dr. Evi Suggs for lethargy and orthostatic lightheadedness.  This patient has a long history of falling asleep from the computer and when watching television.  She sleeps alone and thinks that she sleeps fairly well. She never awakens with gasping. She's never been evaluated for the possibility of sleep apnea. It has been advised that she have a overnight pulse oximetry however she has not had it done    She also tends to have a low normal blood pressure and if she takes a hot shower she gets lightheaded and has to lie down for a while. Increase fluids and salt intake as been recommended. She has never fainted with this problem.  Overall health is otherwise good.  Past Medical History:   Diagnosis Date   • Endometriosis      Past Surgical History:   Procedure Laterality Date   • ABDOMINAL HYSTERECTOMY TOTAL      and BSO for endometriosis   • APPENDECTOMY     • TONSILLECTOMY AND ADENOIDECTOMY       Family History   Problem Relation Age of Onset   • Heart Disease Mother    • Lung Disease Father    • Cancer Father 81     lung   • Diabetes Father    • Lung Disease Sister    • Cancer Sister 55     lung   • Diabetes Brother    • Kidney Disease Brother 49     on dialysis     History   Smoking Status   • Never Smoker   Smokeless Tobacco   • Never Used     No Known Allergies  Outpatient Encounter Prescriptions as of 11/3/2017   Medication Sig Dispense Refill   • fluticasone (FLONASE) 50 MCG/ACT nasal spray Spray 1 Spray in nose every day. (Patient not taking: Reported on 11/3/2017) 16 g 0   • cetirizine (ZYRTEC ALLERGY) 10 MG Tab Take 1 Tab by mouth every day. (Patient not taking: Reported on 11/3/2017) 30 Tab 0     No facility-administered encounter medications on file as of 11/3/2017.      Review of Systems   Constitutional: Positive for malaise/fatigue. Negative for chills and fever.   Eyes: Negative for blurred  vision and discharge.   Respiratory: Negative for cough and shortness of breath.    Cardiovascular: Negative for chest pain, palpitations, leg swelling and PND.   Gastrointestinal: Negative for heartburn and nausea.   Genitourinary: Negative for dysuria and urgency.   Musculoskeletal: Positive for joint pain. Negative for myalgias.   Skin: Negative for itching and rash.   Neurological: Positive for dizziness. Negative for headaches.   Endo/Heme/Allergies: Negative for environmental allergies. Does not bruise/bleed easily.   Psychiatric/Behavioral: Negative for depression. The patient is not nervous/anxious.         Objective:   /68   Pulse 92   Ht 1.524 m (5')   Wt 49.9 kg (110 lb)   SpO2 95%   BMI 21.48 kg/m²     Physical Exam   Constitutional: She is oriented to person, place, and time. She appears well-developed and well-nourished.   HENT:   Head: Normocephalic and atraumatic.   Eyes: Conjunctivae and EOM are normal. No scleral icterus.   Neck: Neck supple. No JVD present. No thyromegaly present.   Cardiovascular: Normal rate, regular rhythm and normal heart sounds.  Exam reveals no gallop and no friction rub.    No murmur heard.  Pulmonary/Chest: Effort normal and breath sounds normal. No respiratory distress. She has no wheezes. She has no rales. She exhibits no tenderness.   Abdominal: Soft. Bowel sounds are normal. She exhibits no distension and no mass. There is no tenderness.   Neurological: She is alert and oriented to person, place, and time. Coordination normal.   Skin: Skin is warm and dry. No rash noted. No pallor.   Psychiatric: She has a normal mood and affect. Her behavior is normal. Judgment and thought content normal.       Assessment:     1. Fatigue, unspecified type  [facility] EKG (Clinic Performed)    OVERNIGHT PULSE OXIMETRY       Medical Decision Making:  Today's Assessment / Status / Plan:   If she has orthostatic hypotension, It is mild and is never caused fainting.  I agree  with Dr. Suggs's recommendation regarding increased fluid and sodium intake  Daytime sleepiness should be pursued at least with a overnight pulse oximetry.  I'll recommend any further cardiac testing.  I don't think low normal blood pressures contributing to her chief complaint of lethargy  Thank you for this consult

## 2017-11-03 NOTE — LETTER
[facility]   [address]   [address]  [place] [address]  Phone: [phone]  Fax: [phone]              Aminah Maldonado  1955    Encounter Date: 11/3/2017    Juan Diego Garcia M.D.          PROGRESS NOTE:  Subjective:   Aminah Maldonado is a 62 y.o. female who presents todayIn consultation at the request of Dr. Evi Suggs for lethargy and orthostatic lightheadedness.  This patient has a long history of falling asleep from the computer and when watching television.  She sleeps alone and thinks that she sleeps fairly well. She never awakens with gasping. She's never been evaluated for the possibility of sleep apnea. It has been advised that she have a overnight pulse oximetry however she has not had it done    She also tends to have a low normal blood pressure and if she takes a hot shower she gets lightheaded and has to lie down for a while. Increase fluids and salt intake as been recommended. She has never fainted with this problem.  Overall health is otherwise good.  Past Medical History:   Diagnosis Date   • Endometriosis      Past Surgical History:   Procedure Laterality Date   • ABDOMINAL HYSTERECTOMY TOTAL      and BSO for endometriosis   • APPENDECTOMY     • TONSILLECTOMY AND ADENOIDECTOMY       Family History   Problem Relation Age of Onset   • Heart Disease Mother    • Lung Disease Father    • Cancer Father 81     lung   • Diabetes Father    • Lung Disease Sister    • Cancer Sister 55     lung   • Diabetes Brother    • Kidney Disease Brother 49     on dialysis     History   Smoking Status   • Never Smoker   Smokeless Tobacco   • Never Used     No Known Allergies  Outpatient Encounter Prescriptions as of 11/3/2017   Medication Sig Dispense Refill   • fluticasone (FLONASE) 50 MCG/ACT nasal spray Spray 1 Spray in nose every day. (Patient not taking: Reported on 11/3/2017) 16 g 0   • cetirizine (ZYRTEC ALLERGY) 10 MG Tab  Take 1 Tab by mouth every day. (Patient not taking: Reported on 11/3/2017) 30 Tab 0     No facility-administered encounter medications on file as of 11/3/2017.      Review of Systems   Constitutional: Positive for malaise/fatigue. Negative for chills and fever.   Eyes: Negative for blurred vision and discharge.   Respiratory: Negative for cough and shortness of breath.    Cardiovascular: Negative for chest pain, palpitations, leg swelling and PND.   Gastrointestinal: Negative for heartburn and nausea.   Genitourinary: Negative for dysuria and urgency.   Musculoskeletal: Positive for joint pain. Negative for myalgias.   Skin: Negative for itching and rash.   Neurological: Positive for dizziness. Negative for headaches.   Endo/Heme/Allergies: Negative for environmental allergies. Does not bruise/bleed easily.   Psychiatric/Behavioral: Negative for depression. The patient is not nervous/anxious.         Objective:   /68   Pulse 92   Ht 1.524 m (5')   Wt 49.9 kg (110 lb)   SpO2 95%   BMI 21.48 kg/m²      Physical Exam   Constitutional: She is oriented to person, place, and time. She appears well-developed and well-nourished.   HENT:   Head: Normocephalic and atraumatic.   Eyes: Conjunctivae and EOM are normal. No scleral icterus.   Neck: Neck supple. No JVD present. No thyromegaly present.   Cardiovascular: Normal rate, regular rhythm and normal heart sounds.  Exam reveals no gallop and no friction rub.    No murmur heard.  Pulmonary/Chest: Effort normal and breath sounds normal. No respiratory distress. She has no wheezes. She has no rales. She exhibits no tenderness.   Abdominal: Soft. Bowel sounds are normal. She exhibits no distension and no mass. There is no tenderness.   Neurological: She is alert and oriented to person, place, and time. Coordination normal.   Skin: Skin is warm and dry. No rash noted. No pallor.   Psychiatric: She has a normal mood and affect. Her behavior is normal. Judgment and  thought content normal.       Assessment:     1. Fatigue, unspecified type  [facility] EKG (Clinic Performed)    OVERNIGHT PULSE OXIMETRY       Medical Decision Making:  Today's Assessment / Status / Plan:   If she has orthostatic hypotension, It is mild and is never caused fainting.  I agree with Dr. Suggs's recommendation regarding increased fluid and sodium intake  Daytime sleepiness should be pursued at least with a overnight pulse oximetry.  I'll recommend any further cardiac testing.  I don't think low normal blood pressures contributing to her chief complaint of lethargy  Thank you for this consult      Evi Suggs M.D.  [address]  [address]  [facility] [address]  VIA In Basket

## 2017-11-06 ENCOUNTER — TELEPHONE (OUTPATIENT)
Dept: CARDIOLOGY | Facility: MEDICAL CENTER | Age: 62
End: 2017-11-06

## 2017-11-06 LAB — EKG IMPRESSION: NORMAL

## 2018-01-02 ENCOUNTER — OFFICE VISIT (OUTPATIENT)
Dept: MEDICAL GROUP | Facility: MEDICAL CENTER | Age: 63
End: 2018-01-02
Payer: COMMERCIAL

## 2018-01-02 VITALS
WEIGHT: 102 LBS | HEART RATE: 68 BPM | RESPIRATION RATE: 16 BRPM | OXYGEN SATURATION: 98 % | TEMPERATURE: 98 F | HEIGHT: 60 IN | DIASTOLIC BLOOD PRESSURE: 62 MMHG | SYSTOLIC BLOOD PRESSURE: 114 MMHG | BODY MASS INDEX: 20.03 KG/M2

## 2018-01-02 DIAGNOSIS — R10.11 RUQ ABDOMINAL PAIN: ICD-10-CM

## 2018-01-02 DIAGNOSIS — T78.3XXA ANGIOEDEMA, INITIAL ENCOUNTER: ICD-10-CM

## 2018-01-02 PROCEDURE — 99214 OFFICE O/P EST MOD 30 MIN: CPT | Performed by: FAMILY MEDICINE

## 2018-01-03 NOTE — ASSESSMENT & PLAN NOTE
She has had 4 episodes where she starts sneezing.  Gets itchy all over.  Her eyes water and swell up.  She gets swollen eyelids and nose (she has pictures on her phone).  Usually in the evening although once was it was in the afternoon.  She saw an allergist but that was quite some time ago.

## 2018-01-03 NOTE — ASSESSMENT & PLAN NOTE
Epigastric to under right rib cage.  Feels like a brick is pressing on it.  It feels inflamed.  The pain is constant but it increases in intensity.  She had a normal endoscopy but no ERCP.  Her ultrasound was normal. She never followed up with PMR for her thoracic degenerative disc disease. We again discussed that this could possibly be referred pain from that.

## 2018-01-04 NOTE — PROGRESS NOTES
Subjective:     Chief Complaint   Patient presents with   • Abdominal Pain     mid   • Edema     Random Swelling       Aminah Maldonado is a 62 y.o. female here today for evaluation and management of:    RUQ abdominal pain  Epigastric to under right rib cage.  Feels like a brick is pressing on it.  It feels inflamed.  The pain is constant but it increases in intensity.  She had a normal endoscopy but no ERCP.  Her ultrasound was normal. She never followed up with PMR for her thoracic degenerative disc disease. We again discussed that this could possibly be referred pain from that.    Angioedema  She has had 4 episodes where she starts sneezing.  Gets itchy all over.  Her eyes water and swell up.  She gets swollen eyelids and nose (she has pictures on her phone).  Usually in the evening although once was it was in the afternoon.  She saw an allergist but that was quite some time ago.       No Known Allergies    Current medicines (including changes today)  Current Outpatient Prescriptions   Medication Sig Dispense Refill   • cetirizine (ZYRTEC ALLERGY) 10 MG Tab Take 1 Tab by mouth every day. 30 Tab 0   • fluticasone (FLONASE) 50 MCG/ACT nasal spray Spray 1 Spray in nose every day. (Patient not taking: Reported on 11/3/2017) 16 g 0     No current facility-administered medications for this visit.        She  has a past medical history of Endometriosis.    Patient Active Problem List    Diagnosis Date Noted   • Angioedema 01/02/2018   • Idiopathic hypotension 09/21/2017   • DDD (degenerative disc disease), thoracic 09/21/2017   • Radicular pain of thoracic region 08/28/2017   • RUQ abdominal pain 08/04/2017   • Cough 08/04/2017   • Dizziness 08/04/2017   • SOB (shortness of breath) 08/04/2017   • Uncontrolled daytime somnolence 05/26/2017   • Elevated serum alkaline phosphatase level 05/26/2017   • Macrocytosis without anemia 05/26/2017   • Acute non-recurrent maxillary sinusitis 02/09/2017   • Fatigue 02/09/2017        ROS   No fever or chills.  No vomiting.  No chest pain or palpitations.  No cough or SOB.  No pain with urination or hematuria.  No black or bloody stools.       Objective:     Blood pressure 114/62, pulse 68, temperature 36.7 °C (98 °F), resp. rate 16, height 1.524 m (5'), weight 46.3 kg (102 lb), SpO2 98 %. Body mass index is 19.92 kg/m².   Physical Exam:  Well developed, well nourished.  Alert, oriented in no acute distress.  Eye contact is good, speech goal directed, affect calm  Eyes: conjunctiva non-injected, sclera non-icteric.  Ears: Pinna normal. TM pearly gray.   Nose: Nares are patent.  Normal mucosa  Mouth: Oral mucous membranes pink and moist with no lesions.  Neck Supple.  No adenopathy or masses in the neck or supraclavicular regions. No thyromegaly  Lungs: clear to auscultation bilaterally with good excursion. No wheezes or rhonchi  CV: regular rate and rhythm. No murmur  Abdomen: soft, nontender, no masses or organomegaly.  No rebound or guarding            Assessment and Plan:   The following treatment plan was discussed    1. RUQ abdominal pain    - We will check complement level to rule out a C4 deficiency. Patient has had a negative abdominal ultrasound and GI workup. At this point we will get a CT scan to further assess  COMPLEMENT C3+C4 SERUM; Future  - CT-ABDOMEN-PELVIS WITH; Future  - CBC WITH DIFFERENTIAL; Future  - BASIC METABOLIC PANEL; Future    2. Angioedema, initial encounter  Discussed seeing an allergist again. Await complement results.  - COMPLEMENT C3+C4 SERUM; Future  - CT-ABDOMEN-PELVIS WITH; Future  - CBC WITH DIFFERENTIAL; Future  - BASIC METABOLIC PANEL; Future    Any change or worsening of signs or symptoms, patient encouraged to follow-up or report to the emergency room for further evaluation. Patient understands and agrees.    Followup: Return in about 4 weeks (around 1/30/2018).

## 2018-01-06 ENCOUNTER — HOSPITAL ENCOUNTER (OUTPATIENT)
Dept: LAB | Facility: MEDICAL CENTER | Age: 63
End: 2018-01-06
Attending: FAMILY MEDICINE
Payer: COMMERCIAL

## 2018-01-06 DIAGNOSIS — T78.3XXA ANGIOEDEMA, INITIAL ENCOUNTER: ICD-10-CM

## 2018-01-06 DIAGNOSIS — R10.11 RUQ ABDOMINAL PAIN: ICD-10-CM

## 2018-01-06 LAB
ANION GAP SERPL CALC-SCNC: 9 MMOL/L (ref 0–11.9)
BASOPHILS # BLD AUTO: 1.3 % (ref 0–1.8)
BASOPHILS # BLD: 0.06 K/UL (ref 0–0.12)
BUN SERPL-MCNC: 6 MG/DL (ref 8–22)
C3 SERPL-MCNC: 107 MG/DL (ref 87–200)
C4 SERPL-MCNC: 23 MG/DL (ref 19–52)
CALCIUM SERPL-MCNC: 9.9 MG/DL (ref 8.5–10.5)
CHLORIDE SERPL-SCNC: 103 MMOL/L (ref 96–112)
CO2 SERPL-SCNC: 28 MMOL/L (ref 20–33)
CREAT SERPL-MCNC: 0.69 MG/DL (ref 0.5–1.4)
EOSINOPHIL # BLD AUTO: 0.09 K/UL (ref 0–0.51)
EOSINOPHIL NFR BLD: 1.9 % (ref 0–6.9)
ERYTHROCYTE [DISTWIDTH] IN BLOOD BY AUTOMATED COUNT: 42.7 FL (ref 35.9–50)
GFR SERPL CREATININE-BSD FRML MDRD: >60 ML/MIN/1.73 M 2
GLUCOSE SERPL-MCNC: 96 MG/DL (ref 65–99)
HCT VFR BLD AUTO: 41.9 % (ref 37–47)
HGB BLD-MCNC: 14.1 G/DL (ref 12–16)
IMM GRANULOCYTES # BLD AUTO: 0.05 K/UL (ref 0–0.11)
IMM GRANULOCYTES NFR BLD AUTO: 1.1 % (ref 0–0.9)
LYMPHOCYTES # BLD AUTO: 0.99 K/UL (ref 1–4.8)
LYMPHOCYTES NFR BLD: 21.2 % (ref 22–41)
MCH RBC QN AUTO: 34.7 PG (ref 27–33)
MCHC RBC AUTO-ENTMCNC: 33.7 G/DL (ref 33.6–35)
MCV RBC AUTO: 103.2 FL (ref 81.4–97.8)
MONOCYTES # BLD AUTO: 0.3 K/UL (ref 0–0.85)
MONOCYTES NFR BLD AUTO: 6.4 % (ref 0–13.4)
NEUTROPHILS # BLD AUTO: 3.18 K/UL (ref 2–7.15)
NEUTROPHILS NFR BLD: 68.1 % (ref 44–72)
NRBC # BLD AUTO: 0 K/UL
NRBC BLD-RTO: 0 /100 WBC
PLATELET # BLD AUTO: 345 K/UL (ref 164–446)
PMV BLD AUTO: 11.2 FL (ref 9–12.9)
POTASSIUM SERPL-SCNC: 4.4 MMOL/L (ref 3.6–5.5)
RBC # BLD AUTO: 4.06 M/UL (ref 4.2–5.4)
SODIUM SERPL-SCNC: 140 MMOL/L (ref 135–145)
WBC # BLD AUTO: 4.7 K/UL (ref 4.8–10.8)

## 2018-01-06 PROCEDURE — 86160 COMPLEMENT ANTIGEN: CPT

## 2018-01-06 PROCEDURE — 80048 BASIC METABOLIC PNL TOTAL CA: CPT

## 2018-01-06 PROCEDURE — 36415 COLL VENOUS BLD VENIPUNCTURE: CPT

## 2018-01-06 PROCEDURE — 85025 COMPLETE CBC W/AUTO DIFF WBC: CPT

## 2018-02-01 ENCOUNTER — PATIENT MESSAGE (OUTPATIENT)
Dept: MEDICAL GROUP | Facility: MEDICAL CENTER | Age: 63
End: 2018-02-01

## 2018-02-01 DIAGNOSIS — T78.3XXA ANGIOEDEMA, INITIAL ENCOUNTER: ICD-10-CM

## 2018-02-03 ENCOUNTER — HOSPITAL ENCOUNTER (EMERGENCY)
Facility: MEDICAL CENTER | Age: 63
End: 2018-02-04
Attending: EMERGENCY MEDICINE
Payer: COMMERCIAL

## 2018-02-03 DIAGNOSIS — T78.40XA ALLERGIC REACTION, INITIAL ENCOUNTER: ICD-10-CM

## 2018-02-03 PROCEDURE — 96372 THER/PROPH/DIAG INJ SC/IM: CPT

## 2018-02-03 PROCEDURE — 700102 HCHG RX REV CODE 250 W/ 637 OVERRIDE(OP): Performed by: EMERGENCY MEDICINE

## 2018-02-03 PROCEDURE — 99285 EMERGENCY DEPT VISIT HI MDM: CPT

## 2018-02-03 PROCEDURE — 700111 HCHG RX REV CODE 636 W/ 250 OVERRIDE (IP): Performed by: EMERGENCY MEDICINE

## 2018-02-03 PROCEDURE — A9270 NON-COVERED ITEM OR SERVICE: HCPCS | Performed by: EMERGENCY MEDICINE

## 2018-02-03 RX ORDER — PREDNISONE 20 MG/1
40 TABLET ORAL ONCE
Status: COMPLETED | OUTPATIENT
Start: 2018-02-03 | End: 2018-02-03

## 2018-02-03 RX ORDER — DIPHENHYDRAMINE HYDROCHLORIDE 50 MG/ML
50 INJECTION INTRAMUSCULAR; INTRAVENOUS ONCE
Status: COMPLETED | OUTPATIENT
Start: 2018-02-03 | End: 2018-02-03

## 2018-02-03 RX ORDER — ALUMINA, MAGNESIA, AND SIMETHICONE 2400; 2400; 240 MG/30ML; MG/30ML; MG/30ML
20 SUSPENSION ORAL ONCE
Status: COMPLETED | OUTPATIENT
Start: 2018-02-03 | End: 2018-02-03

## 2018-02-03 RX ADMIN — ALUMINUM HYDROXIDE, MAGNESIUM HYDROXIDE, AND DIMETHICONE 20 ML: 400; 400; 40 SUSPENSION ORAL at 23:17

## 2018-02-03 RX ADMIN — PREDNISONE 40 MG: 20 TABLET ORAL at 23:19

## 2018-02-03 RX ADMIN — DIPHENHYDRAMINE HYDROCHLORIDE 50 MG: 50 INJECTION, SOLUTION INTRAMUSCULAR; INTRAVENOUS at 23:19

## 2018-02-04 VITALS — HEART RATE: 70 BPM | WEIGHT: 110.67 LBS | HEIGHT: 60 IN | OXYGEN SATURATION: 95 % | BODY MASS INDEX: 21.73 KG/M2

## 2018-02-04 RX ORDER — EPINEPHRINE 0.3 MG/.3ML
0.3 INJECTION SUBCUTANEOUS ONCE
Qty: 0.3 ML | Refills: 0 | Status: SHIPPED | OUTPATIENT
Start: 2018-02-04 | End: 2018-02-04

## 2018-02-04 RX ORDER — PREDNISONE 20 MG/1
60 TABLET ORAL DAILY
Qty: 15 TAB | Refills: 0 | Status: SHIPPED | OUTPATIENT
Start: 2018-02-04 | End: 2018-02-09

## 2018-02-04 NOTE — DISCHARGE INSTRUCTIONS
Allergies  An allergy is an abnormal reaction to a substance by the body's defense system (immune system). Allergies can develop at any age.  WHAT CAUSES ALLERGIES?  An allergic reaction happens when the immune system mistakenly reacts to a normally harmless substance, called an allergen, as if it were harmful. The immune system releases antibodies to fight the substance. Antibodies eventually release a chemical called histamine into the bloodstream. The release of histamine is meant to protect the body from infection, but it also causes discomfort.  An allergic reaction can be triggered by:  · Eating an allergen.  · Inhaling an allergen.  · Touching an allergen.  WHAT TYPES OF ALLERGIES ARE THERE?  There are many types of allergies. Common types include:  · Seasonal allergies. People with this type of allergy are usually allergic to substances that are only present during certain seasons, such as molds and pollens.  · Food allergies.  · Drug allergies.  · Insect allergies.  · Animal dander allergies.  WHAT ARE SYMPTOMS OF ALLERGIES?  Possible allergy symptoms include:  · Swelling of the lips, face, tongue, mouth, or throat.  · Sneezing, coughing, or wheezing.  · Nasal congestion.  · Tingling in the mouth.  · Rash.  · Itching.  · Itchy, red, swollen areas of skin (hives).  · Watery eyes.  · Vomiting.  · Diarrhea.  · Dizziness.  · Lightheadedness.  · Fainting.  · Trouble breathing or swallowing.  · Chest tightness.  · Rapid heartbeat.  HOW ARE ALLERGIES DIAGNOSED?  Allergies are diagnosed with a medical and family history and one or more of the following:  · Skin tests.  · Blood tests.  · A food diary. A food diary is a record of all the foods and drinks you have in a day and of all the symptoms you experience.  · The results of an elimination diet. An elimination diet involves eliminating foods from your diet and then adding them back in one by one to find out if a certain food causes an allergic reaction.  HOW ARE  ALLERGIES TREATED?  There is no cure for allergies, but allergic reactions can be treated with medicine. Severe reactions usually need to be treated at a hospital.  HOW CAN REACTIONS BE PREVENTED?  The best way to prevent an allergic reaction is by avoiding the substance you are allergic to. Allergy shots and medicines can also help prevent reactions in some cases. People with severe allergic reactions may be able to prevent a life-threatening reaction called anaphylaxis with a medicine given right after exposure to the allergen.     This information is not intended to replace advice given to you by your health care provider. Make sure you discuss any questions you have with your health care provider.     Document Released: 03/12/2004 Document Revised: 01/08/2016 Document Reviewed: 09/29/2015  [employer] Interactive Patient Education ©2016 [employer]

## 2018-02-04 NOTE — ED NOTES
Pt states that she is starting to feel better. Pt face appears to be less edematous and the erythema has disappeared.

## 2018-02-04 NOTE — ED NOTES
.Pt D/C to home. VSS. D/C instructions given to patient. Pt to  prescriptions at pharmacy on file. Pt verbalizes understanding. Pt leaves ED with no acute changes, complaints or concerns. Pt ambulated out with a steady gait and all belongings.

## 2018-02-04 NOTE — ED PROVIDER NOTES
ED Provider Note    CHIEF COMPLAINT  No chief complaint on file.      HPI  Aminah Maldonado is a 62 y.o. female who presents here for evaluation of allergic reaction. The patient states that at 10:00 this evening she was returning home from a friend's house when she began noticing increasing swelling around her lips and around her eyes. She states that this swelling was coming by some pruritus, and that the swelling began to progress, involving the back of her throat. The patient was concerned, took a Benadryl at home as well as 20 mg of prednisone previously prescribed for allergic reaction, and stated that she did not have significant improvement of her symptoms. Secondary to this, she decided to present here for evaluation. Notably, the patient denies any associated nausea or vomiting, or shortness of breath. She does complain of a throat swelling however.    REVIEW OF SYSTEMS   Patient denies fever, vision change, endorses sore throat, denies chest pain, shortness of breath, nausea, dysuria, focal muscle pain, rashes, or neurologic deficits     PAST MEDICAL HISTORY   has a past medical history of Endometriosis.    SOCIAL HISTORY  Social History     Social History Main Topics   • Smoking status: Never Smoker   • Smokeless tobacco: Never Used   • Alcohol use No   • Drug use: No   • Sexual activity: No       SURGICAL HISTORY   has a past surgical history that includes appendectomy; abdominal hysterectomy total; and tonsillectomy and adenoidectomy.    CURRENT MEDICATIONS  Home Medications    **Home medications have not yet been reviewed for this encounter**         ALLERGIES  No Known Allergies    PHYSICAL EXAM  VITAL SIGNS: Ht 1.524 m (5')   Wt 50.2 kg (110 lb 10.7 oz)   BMI 21.61 kg/m²    Pulse ox interpretation: I interpret this pulse ox as normal.  Constitutional: Alert in no apparent distress.  HENT: Head with periorbital edema, and oral pharyngeal swelling, there is mild swelling of the uvula and  periuvular tissues, atraumatic, Bilateral external ears normal, Nose normal.  Eyes: Pupils are equal, extraocular movements intact, Conjunctiva normal, Non-icteric.   Cardiovascular: Regular rate and rhythm   Thorax & Lungs: No acute respiratory distress, No wheezing, No increased work of breathing.   Abdomen: Non-distended   Skin: Warm, Dry, No erythema, No rash.   Extremities: Intact distal pulses, No edema, No tenderness, No cyanosis   Neurologic: Alert , Normal motor function, Normal sensory function, No focal deficits noted.   Psychiatric: Affect normal, Judgment normal, Mood normal.       COURSE & MEDICAL DECISION MAKING  Pertinent Labs & Imaging studies reviewed. (See chart for details)    12:03 AM patient reevaluated, having improvement of the edema in the perioral and periocular regions however continues to have a small amount of edema in the periuvular tissues still.    12:32 AM patient reevaluated. Has resolution of her perioral and periorbital swelling and erythema. The uvular swelling is significantly diminished and nearly absent. The patient is feeling much improved.    12:55 AM patient reevaluated. Periocular and perioral swelling is absent, and periuvular tissue swelling has resolved. The patient's feeling well, and plan to discharge the patient with a prescription for prednisone and EpiPen. The patient was given return precautions including development of shortness of breath, throat swelling, or any other new or concerning symptoms.    The patient will return for worsening symptoms and is stable at the time of discharge. The patient verbalizes understanding.    The patient is referred to a primary physician for blood pressure management, diabetic screening, and for all other preventative health concerns should they be present.     FINAL IMPRESSION  1. Allergic reaction  2.   3.          Electronically signed by: Edd Quevedo, 2/3/2018 11:12 PM    This record was made with a voice recognition  software. I have tried to correct any grammar, spelling or context errors to the best of my ability, but errors may still remain. Interpretation of this chart should be taken in this context.

## 2018-02-05 ENCOUNTER — HOSPITAL ENCOUNTER (EMERGENCY)
Facility: MEDICAL CENTER | Age: 63
End: 2018-02-05
Payer: COMMERCIAL

## 2018-02-05 ENCOUNTER — PATIENT OUTREACH (OUTPATIENT)
Dept: HEALTH INFORMATION MANAGEMENT | Facility: OTHER | Age: 63
End: 2018-02-05

## 2018-02-05 NOTE — PROGRESS NOTES
Placed discharge outreach phone call to patient s/p ER discharge 2/4/18.  Left voicemail providing my contact information and instructions to call with any questions or concerns.

## 2018-02-06 ENCOUNTER — OFFICE VISIT (OUTPATIENT)
Dept: MEDICAL GROUP | Facility: PHYSICIAN GROUP | Age: 63
End: 2018-02-06
Payer: COMMERCIAL

## 2018-02-06 VITALS
BODY MASS INDEX: 21.01 KG/M2 | RESPIRATION RATE: 12 BRPM | TEMPERATURE: 98.8 F | OXYGEN SATURATION: 96 % | HEART RATE: 80 BPM | HEIGHT: 60 IN | SYSTOLIC BLOOD PRESSURE: 104 MMHG | WEIGHT: 107 LBS | DIASTOLIC BLOOD PRESSURE: 76 MMHG

## 2018-02-06 DIAGNOSIS — T78.40XD ALLERGIC REACTION, SUBSEQUENT ENCOUNTER: ICD-10-CM

## 2018-02-06 DIAGNOSIS — R10.11 RIGHT UPPER QUADRANT PAIN: ICD-10-CM

## 2018-02-06 DIAGNOSIS — R52 PAIN: ICD-10-CM

## 2018-02-06 PROBLEM — T78.40XA ALLERGIC REACTION: Status: ACTIVE | Noted: 2018-02-06

## 2018-02-06 PROCEDURE — 99203 OFFICE O/P NEW LOW 30 MIN: CPT | Performed by: INTERNAL MEDICINE

## 2018-02-06 RX ORDER — DIPHENHYDRAMINE HCL 25 MG
25 CAPSULE ORAL EVERY 6 HOURS PRN
COMMUNITY
End: 2019-01-26 | Stop reason: CLARIF

## 2018-02-06 NOTE — ASSESSMENT & PLAN NOTE
Pt continues to have pain in the RUQ area. She reports she has had pain in this area for about 2 years. She had an ultrasound for this in 08/2017 that was negative. She is not sure of anything that makes the pain better or worse. There is no association with eating. Denies nausea/vomiting, constipation/diarrhea, melena/hematochezia. She reports she is up to date on colonoscopy screening.

## 2018-02-06 NOTE — ASSESSMENT & PLAN NOTE
Pt is here to follow up on allergic reaction. She was seen in the ER earlier this week for symptoms of swelling at the face and eyes and itching.  During the ER course she was given benadryl and prednisone and had improvement in symptoms. She reports that she has seen an allergist for this in the past and allergy testing was positive for multiple things. She reports she has had this intermittently chronically for many years. She has benadryl and prednisone that she takes for these episodes.     She reports that last night she had a rash that developed all over her body. She had itching initially that then turned into a rash. She took a zyrtec and it resolved.

## 2018-02-06 NOTE — PROGRESS NOTES
Subjective:   Aminah Maldonado is a 62 y.o. female here today for allergic reaction, RUQ pain    Allergic reaction  Pt is here to follow up on allergic reaction. She was seen in the ER earlier this week for symptoms of swelling at the face and eyes and itching.  During the ER course she was given benadryl and prednisone and had improvement in symptoms. She reports that she has seen an allergist for this in the past and allergy testing was positive for multiple things. She reports she has had this intermittently chronically for many years. She has benadryl and prednisone that she takes for these episodes.     She reports that last night she had a rash that developed all over her body. She had itching initially that then turned into a rash. She took a zyrtec and it resolved.     Right upper quadrant pain  Pt continues to have pain in the RUQ area. She reports she has had pain in this area for about 2 years. She had an ultrasound for this in 08/2017 that was negative. She is not sure of anything that makes the pain better or worse. There is no association with eating. Denies nausea/vomiting, constipation/diarrhea, melena/hematochezia. She reports she is up to date on colonoscopy screening.     Pain  Pt reports right sided pins and needles pain that has been present for about 2 years. It occurs on the right neck, arm, abdomen, and leg. She describes it as a pressure in the area and states it is uncomfortable. This has been present for 2 years and has been worsening.        Current medicines (including changes today)  Current Outpatient Prescriptions   Medication Sig Dispense Refill   • diphenhydrAMINE (BENADRYL) 25 MG capsule Take 25 mg by mouth every 6 hours as needed.     • cetirizine (ZYRTEC ALLERGY) 10 MG Tab Take 1 Tab by mouth every day. 30 Tab 0   • predniSONE (DELTASONE) 20 MG Tab Take 3 Tabs by mouth every day for 5 days. 15 Tab 0   • fluticasone (FLONASE) 50 MCG/ACT nasal spray Spray 1 Spray in nose every  day. (Patient not taking: Reported on 11/3/2017) 16 g 0     No current facility-administered medications for this visit.      She  has a past medical history of Endometriosis.    ROS   No fevers/chills, no nausea/vomiting     Objective:     Blood pressure 104/76, pulse 80, temperature 37.1 °C (98.8 °F), resp. rate 12, height 1.524 m (5'), weight 48.5 kg (107 lb), SpO2 96 %. Body mass index is 20.9 kg/m².   Physical Exam:  Constitutional: Alert & oriented, no acute distress  Eye: Conjunctiva clear, lids normal, no discharge  ENMT: Lips without lesions, normal external nose and ears  Neck: Trachea midline, no masses, no thyromegaly. No cervical or supraclavicular lymphadenopathy  Respiratory: Unlabored respiratory effort, lungs clear to auscultation, no wheezes, no ronchi  Cardiovascular: Normal S1, S2, no murmur, no edema  Abdomen: Soft, + mild TTP of right upper and lower abdomen, no guarding or rebound, no hepatosplenomegaly  Skin: Warm, dry, good turgor, no rashes in visible areas  Neuro: No overt focal neurologic deficits  Psych: Normal mood and affect      Assessment and Plan:   The following treatment plan was discussed    1. Allergic reaction, subsequent encounter  Etiology concerning for underlying allergic or immunologic reaction. Patient has prednisone and Benadryl at home in case this recurs. I feel patient would benefit from seeing an allergist/immunologist and will place referral to [facility] allergy for this.   - REFERRAL TO ALLERGY    2. Right upper quadrant pain  Patient is pending CT scan per her primary care provider. Encouraged her to get this done. I will also order urinalysis to assess for possible infection or abnormalities given back pain associated with it. If UA is negative can follow-up with CT scan per PCP    Spent 55 minutes of face-to-face patient contact time in which > 50% was spent in  counseling and coordination of care      Followup: Return in about 4 weeks (around 3/6/2018)  for with PCP.    Please note that this dictation was created using voice recognition software. I have made every reasonable attempt to correct obvious errors, but I expect that there are errors of grammar and possibly content that I did not discover before finalizing the note.

## 2018-02-06 NOTE — ASSESSMENT & PLAN NOTE
Pt reports right sided pins and needles pain that has been present for about 2 years. It occurs on the right neck, arm, abdomen, and leg. She describes it as a pressure in the area and states it is uncomfortable. This has been present for 2 years and has been worsening.

## 2018-02-09 ENCOUNTER — HOSPITAL ENCOUNTER (OUTPATIENT)
Dept: RADIOLOGY | Facility: MEDICAL CENTER | Age: 63
End: 2018-02-09
Attending: FAMILY MEDICINE
Payer: COMMERCIAL

## 2018-02-09 DIAGNOSIS — T78.3XXA ANGIOEDEMA, INITIAL ENCOUNTER: ICD-10-CM

## 2018-02-09 DIAGNOSIS — R10.11 RUQ ABDOMINAL PAIN: ICD-10-CM

## 2018-02-09 PROCEDURE — 74177 CT ABD & PELVIS W/CONTRAST: CPT

## 2018-02-09 PROCEDURE — 700117 HCHG RX CONTRAST REV CODE 255: Performed by: FAMILY MEDICINE

## 2018-02-09 RX ADMIN — IOHEXOL 50 ML: 240 INJECTION, SOLUTION INTRATHECAL; INTRAVASCULAR; INTRAVENOUS; ORAL at 17:13

## 2018-02-09 RX ADMIN — IOHEXOL 100 ML: 350 INJECTION, SOLUTION INTRAVENOUS at 17:13

## 2018-04-05 ENCOUNTER — HOSPITAL ENCOUNTER (OUTPATIENT)
Dept: LAB | Facility: MEDICAL CENTER | Age: 63
End: 2018-04-05
Attending: ALLERGY & IMMUNOLOGY
Payer: COMMERCIAL

## 2018-04-05 LAB
ALBUMIN SERPL BCP-MCNC: 4.7 G/DL (ref 3.2–4.9)
ALP SERPL-CCNC: 141 U/L (ref 30–99)
ALT SERPL-CCNC: 11 U/L (ref 2–50)
APPEARANCE UR: CLEAR
AST SERPL-CCNC: 19 U/L (ref 12–45)
BILIRUB CONJ SERPL-MCNC: 0.2 MG/DL (ref 0.1–0.5)
BILIRUB INDIRECT SERPL-MCNC: 1.2 MG/DL (ref 0–1)
BILIRUB SERPL-MCNC: 1.4 MG/DL (ref 0.1–1.5)
BILIRUB UR QL STRIP.AUTO: NEGATIVE
C3 SERPL-MCNC: 118 MG/DL (ref 87–200)
C4 SERPL-MCNC: 28 MG/DL (ref 19–52)
COLOR UR: YELLOW
CULTURE IF INDICATED INDCX: NO UA CULTURE
GLUCOSE UR STRIP.AUTO-MCNC: NEGATIVE MG/DL
HGB RETIC QN AUTO: 39.7 PG/CELL (ref 29–35)
IMM RETICS NFR: 8.6 % (ref 9.3–17.4)
KETONES UR STRIP.AUTO-MCNC: NEGATIVE MG/DL
LDH SERPL L TO P-CCNC: 267 U/L (ref 107–266)
LEUKOCYTE ESTERASE UR QL STRIP.AUTO: NEGATIVE
MICRO URNS: NORMAL
NITRITE UR QL STRIP.AUTO: NEGATIVE
PH UR STRIP.AUTO: 7 [PH]
PROT SERPL-MCNC: 7.4 G/DL (ref 6–8.2)
PROT UR QL STRIP: NEGATIVE MG/DL
RBC UR QL AUTO: NEGATIVE
RETICS # AUTO: 0.06 M/UL (ref 0.04–0.06)
RETICS/RBC NFR: 1.7 % (ref 0.8–2.1)
SP GR UR STRIP.AUTO: 1.01
UROBILINOGEN UR STRIP.AUTO-MCNC: 0.2 MG/DL

## 2018-04-05 PROCEDURE — 83520 IMMUNOASSAY QUANT NOS NONAB: CPT

## 2018-04-05 PROCEDURE — 83010 ASSAY OF HAPTOGLOBIN QUANT: CPT

## 2018-04-05 PROCEDURE — 83883 ASSAY NEPHELOMETRY NOT SPEC: CPT | Mod: 91

## 2018-04-05 PROCEDURE — 86162 COMPLEMENT TOTAL (CH50): CPT

## 2018-04-05 PROCEDURE — 81003 URINALYSIS AUTO W/O SCOPE: CPT

## 2018-04-05 PROCEDURE — 86161 COMPLEMENT/FUNCTION ACTIVITY: CPT

## 2018-04-05 PROCEDURE — 85046 RETICYTE/HGB CONCENTRATE: CPT

## 2018-04-05 PROCEDURE — 86160 COMPLEMENT ANTIGEN: CPT

## 2018-04-05 PROCEDURE — 80076 HEPATIC FUNCTION PANEL: CPT

## 2018-04-05 PROCEDURE — 84165 PROTEIN E-PHORESIS SERUM: CPT

## 2018-04-05 PROCEDURE — 88184 FLOWCYTOMETRY/ TC 1 MARKER: CPT

## 2018-04-05 PROCEDURE — 84160 ASSAY OF PROTEIN ANY SOURCE: CPT

## 2018-04-05 PROCEDURE — 83615 LACTATE (LD) (LDH) ENZYME: CPT

## 2018-04-05 PROCEDURE — 36415 COLL VENOUS BLD VENIPUNCTURE: CPT

## 2018-04-05 PROCEDURE — 84156 ASSAY OF PROTEIN URINE: CPT

## 2018-04-07 LAB — HAPTOGLOB SERPL-MCNC: 46 MG/DL (ref 30–200)

## 2018-04-08 LAB
ALBUMIN 24H UR QL ELPH: DETECTED
ALBUMIN SERPL-MCNC: 4.92 G/DL (ref 3.75–5.01)
ALPHA1 GLOB 24H UR QL ELPH: ABNORMAL
ALPHA1 GLOB SERPL ELPH-MCNC: 0.29 G/DL (ref 0.19–0.46)
ALPHA2 GLOB 24H UR QL ELPH: ABNORMAL
ALPHA2 GLOB SERPL ELPH-MCNC: 0.65 G/DL (ref 0.48–1.05)
B-GLOBULIN SERPL ELPH-MCNC: 0.77 G/DL (ref 0.48–1.1)
B-GLOBULIN UR QL ELPH: ABNORMAL
C1INH FUNCTIONAL/C1INH TOTAL MFR SERPL: 95 %
C1INH SERPL-MCNC: 32 MG/DL (ref 21–39)
CH50 SERPL-ACNC: 75 CAE UNITS (ref 60–144)
COLLECT DURATION TIME SPEC: ABNORMAL H
EER PROT ELECT SER Q1092: NORMAL
GAMMA GLOB SERPL ELPH-MCNC: 0.88 G/DL (ref 0.62–1.51)
GAMMA GLOB UR QL ELPH: ABNORMAL
INTERPRETATION SERPL IFE-IMP: NORMAL
INTERPRETATION UR IFE-IMP: ABNORMAL
KAPPA LC FREE UR-MCNC: 0.12 MG/DL (ref 0.14–2.42)
KAPPA LC FREE/LAMBDA FREE UR: 12 RATIO (ref 2.04–10.37)
LAMBDA LC FREE UR-MCNC: 0.01 MG/DL (ref 0.02–0.67)
PROT 24H UR-MRATE: ABNORMAL MG/D (ref 10–140)
PROT SERPL-MCNC: 7.5 G/DL (ref 6–8.3)
TOTAL VOLUME 1105: ABNORMAL ML
TRYPTASE SERPL-MCNC: 3 UG/L

## 2018-04-13 LAB — URTIIND BASO ACT Q4770: 0 %

## 2018-04-27 ENCOUNTER — TELEPHONE (OUTPATIENT)
Dept: MEDICAL GROUP | Facility: MEDICAL CENTER | Age: 63
End: 2018-04-27

## 2018-04-27 NOTE — TELEPHONE ENCOUNTER
VOICEMAIL  1. Caller Name: Aminah Maldonado   Call Back Number: [phone] (home)       2. Message: Pt states you spoke with her Allergy Doctor on Wednesday, she is wondering what her next steps are going to be?    3. Patient approves office to leave a detailed voicemail/MyChart message: yes

## 2018-05-03 ENCOUNTER — TELEPHONE (OUTPATIENT)
Dept: MEDICAL GROUP | Facility: MEDICAL CENTER | Age: 63
End: 2018-05-03

## 2018-05-03 ENCOUNTER — OFFICE VISIT (OUTPATIENT)
Dept: MEDICAL GROUP | Facility: MEDICAL CENTER | Age: 63
End: 2018-05-03
Payer: COMMERCIAL

## 2018-05-03 ENCOUNTER — HOSPITAL ENCOUNTER (OUTPATIENT)
Dept: LAB | Facility: MEDICAL CENTER | Age: 63
End: 2018-05-03
Attending: NURSE PRACTITIONER
Payer: COMMERCIAL

## 2018-05-03 VITALS
TEMPERATURE: 98.5 F | WEIGHT: 104 LBS | OXYGEN SATURATION: 98 % | SYSTOLIC BLOOD PRESSURE: 120 MMHG | HEART RATE: 78 BPM | BODY MASS INDEX: 20.42 KG/M2 | DIASTOLIC BLOOD PRESSURE: 80 MMHG | HEIGHT: 60 IN

## 2018-05-03 DIAGNOSIS — D89.89 LIGHT CHAIN DISEASE, KAPPA TYPE (HCC): ICD-10-CM

## 2018-05-03 DIAGNOSIS — L50.1 IDIOPATHIC URTICARIA: ICD-10-CM

## 2018-05-03 DIAGNOSIS — R63.1 POLYDIPSIA: ICD-10-CM

## 2018-05-03 DIAGNOSIS — R21 RASH: ICD-10-CM

## 2018-05-03 DIAGNOSIS — R74.8 ABNORMAL SERUM LEVEL OF ALKALINE PHOSPHATASE: ICD-10-CM

## 2018-05-03 DIAGNOSIS — R63.4 WEIGHT LOSS, NON-INTENTIONAL: ICD-10-CM

## 2018-05-03 DIAGNOSIS — R41.0 CONFUSION: ICD-10-CM

## 2018-05-03 LAB
ALBUMIN SERPL BCP-MCNC: 4.9 G/DL (ref 3.2–4.9)
ALBUMIN/GLOB SERPL: 1.8 G/DL
ALP SERPL-CCNC: 130 U/L (ref 30–99)
ALT SERPL-CCNC: 10 U/L (ref 2–50)
ANION GAP SERPL CALC-SCNC: 10 MMOL/L (ref 0–11.9)
AST SERPL-CCNC: 16 U/L (ref 12–45)
BASOPHILS # BLD AUTO: 0.7 % (ref 0–1.8)
BASOPHILS # BLD: 0.04 K/UL (ref 0–0.12)
BILIRUB SERPL-MCNC: 1.7 MG/DL (ref 0.1–1.5)
BUN SERPL-MCNC: 6 MG/DL (ref 8–22)
CALCIUM SERPL-MCNC: 9.7 MG/DL (ref 8.5–10.5)
CHLORIDE SERPL-SCNC: 99 MMOL/L (ref 96–112)
CO2 SERPL-SCNC: 28 MMOL/L (ref 20–33)
CREAT SERPL-MCNC: 0.39 MG/DL (ref 0.5–1.4)
EOSINOPHIL # BLD AUTO: 0.04 K/UL (ref 0–0.51)
EOSINOPHIL NFR BLD: 0.7 % (ref 0–6.9)
ERYTHROCYTE [DISTWIDTH] IN BLOOD BY AUTOMATED COUNT: 42.2 FL (ref 35.9–50)
ERYTHROCYTE [SEDIMENTATION RATE] IN BLOOD BY WESTERGREN METHOD: 11 MM/HOUR (ref 0–30)
GLOBULIN SER CALC-MCNC: 2.8 G/DL (ref 1.9–3.5)
GLUCOSE SERPL-MCNC: 96 MG/DL (ref 65–99)
HCT VFR BLD AUTO: 42.5 % (ref 37–47)
HGB BLD-MCNC: 14.6 G/DL (ref 12–16)
IMM GRANULOCYTES # BLD AUTO: 0.02 K/UL (ref 0–0.11)
IMM GRANULOCYTES NFR BLD AUTO: 0.4 % (ref 0–0.9)
LYMPHOCYTES # BLD AUTO: 1.04 K/UL (ref 1–4.8)
LYMPHOCYTES NFR BLD: 18.3 % (ref 22–41)
MCH RBC QN AUTO: 35 PG (ref 27–33)
MCHC RBC AUTO-ENTMCNC: 34.4 G/DL (ref 33.6–35)
MCV RBC AUTO: 101.9 FL (ref 81.4–97.8)
MONOCYTES # BLD AUTO: 0.31 K/UL (ref 0–0.85)
MONOCYTES NFR BLD AUTO: 5.4 % (ref 0–13.4)
NEUTROPHILS # BLD AUTO: 4.24 K/UL (ref 2–7.15)
NEUTROPHILS NFR BLD: 74.5 % (ref 44–72)
NRBC # BLD AUTO: 0 K/UL
NRBC BLD-RTO: 0 /100 WBC
PLATELET # BLD AUTO: 416 K/UL (ref 164–446)
PMV BLD AUTO: 10.5 FL (ref 9–12.9)
POTASSIUM SERPL-SCNC: 4 MMOL/L (ref 3.6–5.5)
PROT SERPL-MCNC: 7.7 G/DL (ref 6–8.2)
RBC # BLD AUTO: 4.17 M/UL (ref 4.2–5.4)
SODIUM SERPL-SCNC: 137 MMOL/L (ref 135–145)
WBC # BLD AUTO: 5.7 K/UL (ref 4.8–10.8)

## 2018-05-03 PROCEDURE — 82784 ASSAY IGA/IGD/IGG/IGM EACH: CPT

## 2018-05-03 PROCEDURE — 84075 ASSAY ALKALINE PHOSPHATASE: CPT

## 2018-05-03 PROCEDURE — 36415 COLL VENOUS BLD VENIPUNCTURE: CPT

## 2018-05-03 PROCEDURE — 85025 COMPLETE CBC W/AUTO DIFF WBC: CPT

## 2018-05-03 PROCEDURE — 80053 COMPREHEN METABOLIC PANEL: CPT

## 2018-05-03 PROCEDURE — 99214 OFFICE O/P EST MOD 30 MIN: CPT | Performed by: NURSE PRACTITIONER

## 2018-05-03 PROCEDURE — 85652 RBC SED RATE AUTOMATED: CPT

## 2018-05-03 PROCEDURE — 86038 ANTINUCLEAR ANTIBODIES: CPT

## 2018-05-03 PROCEDURE — 84080 ASSAY ALKALINE PHOSPHATASES: CPT

## 2018-05-03 ASSESSMENT — PATIENT HEALTH QUESTIONNAIRE - PHQ9
5. POOR APPETITE OR OVEREATING: 3 - NEARLY EVERY DAY
SUM OF ALL RESPONSES TO PHQ QUESTIONS 1-9: 12
CLINICAL INTERPRETATION OF PHQ2 SCORE: 1

## 2018-05-03 NOTE — PROGRESS NOTES
Subjective:     Aminah Maldonado is a 62 y.o. female who presents with allergic reaction.    HPI:     Seen in f/u for allergic rash.  This is pt of Evi Suggs.  She has been having rashes.  Swelling of face and body.  She is loosing weight.  Having confusion now.  She has been to ER twice.  She has seen Dr Gomes.    Pt is desperate to find out what is going on with her.   Reviewed all lab done by annetta.  He has spoke with ES but no note in epic.   Urticaria jprofile, haptoglobin, LDH, UA TRYPTASE, PROTEIN ELECTROPHORESIS, c1, c1 ESTERASE, CH50, C3, C4 is wnl.    There is abn on kappa lite chain ratio.  There is also abn elevated alk phos that is long term.  T bili is elevated.  retic count is abn  Ct abd done 1/18 did not show any significant abn.  Continues to have rashes.  Her face will get swollen for ? Reason.    havign change in bowel habits now.  More thirstty.  She has pics of her face swelling.  No one has gotten dx at this time.    Patient Active Problem List    Diagnosis Date Noted   • Allergic reaction 02/06/2018   • Pain 02/06/2018   • Angioedema 01/02/2018   • Idiopathic hypotension 09/21/2017   • DDD (degenerative disc disease), thoracic 09/21/2017   • Radicular pain of thoracic region 08/28/2017   • Right upper quadrant pain 08/04/2017   • Cough 08/04/2017   • Dizziness 08/04/2017   • SOB (shortness of breath) 08/04/2017   • Uncontrolled daytime somnolence 05/26/2017   • Elevated serum alkaline phosphatase level 05/26/2017   • Macrocytosis without anemia 05/26/2017   • Acute non-recurrent maxillary sinusitis 02/09/2017   • Fatigue 02/09/2017       Current medicines (including changes today)  Current Outpatient Prescriptions   Medication Sig Dispense Refill   • diphenhydrAMINE (BENADRYL) 25 MG capsule Take 25 mg by mouth every 6 hours as needed.     • fluticasone (FLONASE) 50 MCG/ACT nasal spray Spray 1 Spray in nose every day. (Patient not taking: Reported on 11/3/2017) 16 g 0   • cetirizine  (ZYRTEC ALLERGY) 10 MG Tab Take 1 Tab by mouth every day. 30 Tab 0     No current facility-administered medications for this visit.        No Known Allergies    ROS  Constitutional: Negative. Negative for fever, chills, weight loss,  and diaphoresis.   HENT: Negative. Negative for hearing loss, ear pain, nosebleeds, congestion, sore throat, neck pain, tinnitus and ear discharge.   Respiratory: Negative. Negative for cough, hemoptysis, sputum production, shortness of breath, wheezing and stridor.   Cardiovascular: Negative. Negative for chest pain, palpitations, orthopnea, claudication, leg swelling and PND.   Gastrointestinal: Denies nausea, vomiting, diarrhea,  heartburn, melena or hematochezia.  Genitourinary: Denies dysuria, hematuria, urinary incontinence, frequency or urgency.        Objective:     Blood pressure 120/80, pulse 78, temperature 36.9 °C (98.5 °F), height 1.524 m (5'), weight 47.2 kg (104 lb), SpO2 98 %, not currently breastfeeding. Body mass index is 20.31 kg/m².    Physical Exam:  Vitals reviewed.  Constitutional: Oriented to person, place, and time. appears well-developed and well-nourished. No distress.   Cardiovascular: Normal rate, regular rhythm, normal heart sounds and intact distal pulses. Exam reveals no gallop and no friction rub. No murmur heard. No carotid bruits.   Pulmonary/Chest: Effort normal and breath sounds normal. No stridor. No respiratory distress. no wheezes or rales. exhibits no tenderness.   Musculoskeletal: Normal range of motion. exhibits no edema. sussy pedal pulses 2+.  Lymphadenopathy: No cervical or supraclavicular adenopathy.   Neurological: Alert and oriented to person, place, and time. exhibits normal muscle tone.  Skin: Skin is warm and dry. No diaphoresis.   Psychiatric: Normal mood and affect. Behavior is normal.      Assessment and Plan:     The following treatment plan was discussed:    1. Rash  ALKALINE PHOSPHATASE ISOENZYMES    EUFEMIA ANTIBODY WITH REFLEX     WESTERGREN SED RATE    COMP METABOLIC PANEL    IMMUNOGLOBULINS A/G/M SERUM    CBC WITH DIFFERENTIAL    REFERRAL TO INTAKE ONCOLOGY COORDINATOR    CANCELED: REFERRAL TO HEMATOLOGY ONCOLOGY Referral to? [facility]    do lab. jad adkins [facility].  call [facility] office when opens to see what he recommended.     2. Idiopathic urticaria  ALKALINE PHOSPHATASE ISOENZYMES    EUFEMIA ANTIBODY WITH REFLEX    WESTERGREN SED RATE    COMP METABOLIC PANEL    IMMUNOGLOBULINS A/G/M SERUM    CBC WITH DIFFERENTIAL    REFERRAL TO INTAKE ONCOLOGY COORDINATOR    CANCELED: REFERRAL TO HEMATOLOGY ONCOLOGY Referral to? [facility]   3. Light chain disease, kappa type (HCC)  ALKALINE PHOSPHATASE ISOENZYMES    EUFEMIA ANTIBODY WITH REFLEX    WESTERGREN SED RATE    COMP METABOLIC PANEL    IMMUNOGLOBULINS A/G/M SERUM    CBC WITH DIFFERENTIAL    REFERRAL TO INTAKE ONCOLOGY COORDINATOR    CANCELED: REFERRAL TO HEMATOLOGY ONCOLOGY Referral to? [facility]   4. Abnormal serum level of alkaline phosphatase  ALKALINE PHOSPHATASE ISOENZYMES    EUFEMIA ANTIBODY WITH REFLEX    WESTERGREN SED RATE    COMP METABOLIC PANEL    IMMUNOGLOBULINS A/G/M SERUM    CBC WITH DIFFERENTIAL    REFERRAL TO INTAKE ONCOLOGY COORDINATOR    CANCELED: REFERRAL TO HEMATOLOGY ONCOLOGY Referral to? [facility]   5. Confusion  ALKALINE PHOSPHATASE ISOENZYMES    EUFEMIA ANTIBODY WITH REFLEX    WESTERGREN SED RATE    COMP METABOLIC PANEL    IMMUNOGLOBULINS A/G/M SERUM    CBC WITH DIFFERENTIAL    REFERRAL TO INTAKE ONCOLOGY COORDINATOR    CANCELED: REFERRAL TO HEMATOLOGY ONCOLOGY Referral to? [facility]   6. Weight loss, non-intentional  ALKALINE PHOSPHATASE ISOENZYMES    EUFEMIA ANTIBODY WITH REFLEX    WESTERGREN SED RATE    COMP METABOLIC PANEL    IMMUNOGLOBULINS A/G/M SERUM    CBC WITH DIFFERENTIAL    REFERRAL TO INTAKE ONCOLOGY COORDINATOR    CANCELED: REFERRAL TO HEMATOLOGY ONCOLOGY Referral to? [facility]   7. Polydipsia  ALKALINE PHOSPHATASE ISOENZYMES    EUFEMIA ANTIBODY WITH REFLEX     WESTERGREN SED RATE    COMP METABOLIC PANEL    IMMUNOGLOBULINS A/G/M SERUM    CBC WITH DIFFERENTIAL    REFERRAL TO INTAKE ONCOLOGY COORDINATOR    CANCELED: REFERRAL TO HEMATOLOGY ONCOLOGY Referral to? [facility]         Followup: Return if symptoms worsen or fail to improve.

## 2018-05-03 NOTE — TELEPHONE ENCOUNTER
TC to dr gomes.  He explained that alk phos abn needs to be worked up with isoenzymes. Then that can be f/u depending upon results with either bone or liver w/u.  He doesn't think that pt has cancer.  i exp that i have referred to [facility].  He stated that nothing is emergent.  She has had these sx long term.  Has idiopathic angioedema.  Nothing really significant with lab that he did.      Tara:  Please let pt know that Dr Gomes doesn't think any cancer.  Further w/u will be based on what [facility] thinks and the results to the alk phos isoenzymes that she had don today

## 2018-05-04 ENCOUNTER — HOSPITAL ENCOUNTER (EMERGENCY)
Facility: MEDICAL CENTER | Age: 63
End: 2018-05-04
Attending: EMERGENCY MEDICINE
Payer: COMMERCIAL

## 2018-05-04 VITALS
TEMPERATURE: 98.9 F | RESPIRATION RATE: 18 BRPM | HEART RATE: 79 BPM | WEIGHT: 104.28 LBS | DIASTOLIC BLOOD PRESSURE: 68 MMHG | SYSTOLIC BLOOD PRESSURE: 106 MMHG | HEIGHT: 60 IN | BODY MASS INDEX: 20.47 KG/M2 | OXYGEN SATURATION: 98 %

## 2018-05-04 DIAGNOSIS — R53.83 OTHER FATIGUE: ICD-10-CM

## 2018-05-04 DIAGNOSIS — R63.0 DECREASED APPETITE: ICD-10-CM

## 2018-05-04 LAB
ALBUMIN SERPL BCP-MCNC: 4.5 G/DL (ref 3.2–4.9)
ALBUMIN/GLOB SERPL: 1.9 G/DL
ALP SERPL-CCNC: 128 U/L (ref 30–99)
ALT SERPL-CCNC: 14 U/L (ref 2–50)
ANION GAP SERPL CALC-SCNC: 8 MMOL/L (ref 0–11.9)
APPEARANCE UR: CLEAR
AST SERPL-CCNC: 21 U/L (ref 12–45)
BASOPHILS # BLD AUTO: 1.1 % (ref 0–1.8)
BASOPHILS # BLD: 0.08 K/UL (ref 0–0.12)
BILIRUB SERPL-MCNC: 1.9 MG/DL (ref 0.1–1.5)
BUN SERPL-MCNC: 6 MG/DL (ref 8–22)
CALCIUM SERPL-MCNC: 9.3 MG/DL (ref 8.4–10.2)
CHLORIDE SERPL-SCNC: 103 MMOL/L (ref 96–112)
CO2 SERPL-SCNC: 26 MMOL/L (ref 20–33)
COLOR UR: YELLOW
CREAT SERPL-MCNC: 0.58 MG/DL (ref 0.5–1.4)
EOSINOPHIL # BLD AUTO: 0.08 K/UL (ref 0–0.51)
EOSINOPHIL NFR BLD: 1.1 % (ref 0–6.9)
ERYTHROCYTE [DISTWIDTH] IN BLOOD BY AUTOMATED COUNT: 40.9 FL (ref 35.9–50)
GLOBULIN SER CALC-MCNC: 2.4 G/DL (ref 1.9–3.5)
GLUCOSE SERPL-MCNC: 100 MG/DL (ref 65–99)
GLUCOSE UR STRIP.AUTO-MCNC: NEGATIVE MG/DL
HCT VFR BLD AUTO: 37.8 % (ref 37–47)
HGB BLD-MCNC: 13.3 G/DL (ref 12–16)
IGA SERPL-MCNC: 188 MG/DL (ref 68–408)
IGG SERPL-MCNC: 979 MG/DL (ref 768–1632)
IGM SERPL-MCNC: 81 MG/DL (ref 35–263)
IMM GRANULOCYTES # BLD AUTO: 0.01 K/UL (ref 0–0.11)
IMM GRANULOCYTES NFR BLD AUTO: 0.1 % (ref 0–0.9)
KETONES UR STRIP.AUTO-MCNC: NEGATIVE MG/DL
LEUKOCYTE ESTERASE UR QL STRIP.AUTO: ABNORMAL
LIPASE SERPL-CCNC: 27 U/L (ref 7–58)
LYMPHOCYTES # BLD AUTO: 1.42 K/UL (ref 1–4.8)
LYMPHOCYTES NFR BLD: 20.3 % (ref 22–41)
MCH RBC QN AUTO: 35 PG (ref 27–33)
MCHC RBC AUTO-ENTMCNC: 35.2 G/DL (ref 33.6–35)
MCV RBC AUTO: 99.5 FL (ref 81.4–97.8)
MONOCYTES # BLD AUTO: 0.45 K/UL (ref 0–0.85)
MONOCYTES NFR BLD AUTO: 6.4 % (ref 0–13.4)
NEUTROPHILS # BLD AUTO: 4.94 K/UL (ref 2–7.15)
NEUTROPHILS NFR BLD: 71 % (ref 44–72)
NITRITE UR QL STRIP.AUTO: NEGATIVE
NRBC # BLD AUTO: 0 K/UL
NRBC BLD-RTO: 0 /100 WBC
NUCLEAR IGG SER QL IA: NORMAL
PH UR STRIP.AUTO: 6.5 [PH]
PLATELET # BLD AUTO: 377 K/UL (ref 164–446)
PMV BLD AUTO: 9.8 FL (ref 9–12.9)
POTASSIUM SERPL-SCNC: 3.6 MMOL/L (ref 3.6–5.5)
PROT SERPL-MCNC: 6.9 G/DL (ref 6–8.2)
PROT UR QL STRIP: NEGATIVE MG/DL
RBC # BLD AUTO: 3.8 M/UL (ref 4.2–5.4)
RBC UR QL AUTO: ABNORMAL
SODIUM SERPL-SCNC: 137 MMOL/L (ref 135–145)
SP GR UR STRIP.AUTO: 1.01
WBC # BLD AUTO: 7 K/UL (ref 4.8–10.8)

## 2018-05-04 PROCEDURE — 83690 ASSAY OF LIPASE: CPT

## 2018-05-04 PROCEDURE — 81002 URINALYSIS NONAUTO W/O SCOPE: CPT

## 2018-05-04 PROCEDURE — A9270 NON-COVERED ITEM OR SERVICE: HCPCS | Performed by: EMERGENCY MEDICINE

## 2018-05-04 PROCEDURE — 80053 COMPREHEN METABOLIC PANEL: CPT

## 2018-05-04 PROCEDURE — 700102 HCHG RX REV CODE 250 W/ 637 OVERRIDE(OP): Performed by: EMERGENCY MEDICINE

## 2018-05-04 PROCEDURE — 99285 EMERGENCY DEPT VISIT HI MDM: CPT

## 2018-05-04 PROCEDURE — 36415 COLL VENOUS BLD VENIPUNCTURE: CPT

## 2018-05-04 PROCEDURE — 85025 COMPLETE CBC W/AUTO DIFF WBC: CPT

## 2018-05-04 RX ORDER — METOCLOPRAMIDE 10 MG/1
10 TABLET ORAL 4 TIMES DAILY PRN
Qty: 60 TAB | Refills: 0 | Status: SHIPPED | OUTPATIENT
Start: 2018-05-04 | End: 2019-01-26 | Stop reason: CLARIF

## 2018-05-04 RX ORDER — METOCLOPRAMIDE 10 MG/1
10 TABLET ORAL ONCE
Status: COMPLETED | OUTPATIENT
Start: 2018-05-04 | End: 2018-05-04

## 2018-05-04 RX ORDER — METOCLOPRAMIDE 10 MG/1
10 TABLET ORAL 4 TIMES DAILY
Qty: 120 TAB | Refills: 0 | Status: SHIPPED | OUTPATIENT
Start: 2018-05-04 | End: 2018-05-04

## 2018-05-04 RX ADMIN — METOCLOPRAMIDE HYDROCHLORIDE 10 MG: 10 TABLET ORAL at 18:36

## 2018-05-05 ENCOUNTER — PATIENT OUTREACH (OUTPATIENT)
Dept: HEALTH INFORMATION MANAGEMENT | Facility: OTHER | Age: 63
End: 2018-05-05

## 2018-05-05 LAB
ALP BONE SERPL-CCNC: 98 U/L (ref 0–55)
ALP ISOS SERPL HS-CCNC: 0 U/L
ALP LIVER SERPL-CCNC: 65 U/L (ref 0–94)
ALP SERPL-CCNC: 163 U/L (ref 40–120)

## 2018-05-05 ASSESSMENT — ENCOUNTER SYMPTOMS
NECK PAIN: 0
CHILLS: 0
NAUSEA: 0
WEAKNESS: 1
SORE THROAT: 0
FEVER: 0
VOMITING: 0
BLURRED VISION: 0
HEADACHES: 0
SHORTNESS OF BREATH: 0
DIZZINESS: 0
ROS GI COMMENTS: POSITIVE FOR DECREASED APPETITE

## 2018-05-05 NOTE — ED NOTES
Written and verbal discharge instructions reviewed with pt  and friends, verbalized understanding. Vital signs WNL. Pt ambulated independently to discharge

## 2018-05-05 NOTE — ED NOTES
"Assessment complete. Iv placed and blood sent to the lab. Pt reports multiple symptoms intermittently x 2 years. Reports currently seen by pcp, allergist, and oncologist with no definitive diagnosis. Reports here today \"because I dont want to have any episodes over the weekend\" reports rash to back at his time. +itching. Denies dizziness or lightheadedness at this time.   "

## 2018-05-05 NOTE — ED NOTES
Pt to ED bib friend, pt states that she has a rash on her back as well as has been losing weight unintentionally.

## 2018-05-05 NOTE — ED PROVIDER NOTES
ED Provider Note    Primary care provider: Evi Suggs M.D.  Means of arrival: Private vehicle  History obtained from: Patient  History limited by: None    CHIEF COMPLAINT  Chief Complaint   Patient presents with   • Rash   • Weight Loss       HPI  Aminah Maldonado is a 62 y.o. female who presents to the Emergency Department for fatigue, decreased appetite, and weight loss. Patient reports that for the past 2 years she has had generalized fatigue, intermittent rashes on her back, and unintentional weight loss. She is currently being worked up by her primary care physician for all of the symptoms. She has been found to have an elevated alkaline phosphatase and has follow up with an oncologist next week for evaluation of possible malignancy as underlying etiology of her elevated alkaline phosphatase and generalized fatigue. Patient comes in today because over the past couple weeks she has noticed worsening in her decreased appetite and has had unintentional weight loss of 10 pounds in the last 2 weeks. She reports associated generalized fatigue and weakness. She denies fevers, chills, headache, lightheadedness, nausea, vomiting, abdominal pain, chest pain.    REVIEW OF SYSTEMS  Review of Systems   Constitutional: Negative for chills and fever.   HENT: Negative for sore throat.    Eyes: Negative for blurred vision.   Respiratory: Negative for shortness of breath.    Cardiovascular: Negative for chest pain.   Gastrointestinal: Negative for nausea and vomiting.        Positive for decreased appetite   Genitourinary: Negative for dysuria.   Musculoskeletal: Negative for neck pain.   Neurological: Positive for weakness. Negative for dizziness and headaches.       PAST MEDICAL HISTORY   has a past medical history of Endometriosis.    SURGICAL HISTORY   has a past surgical history that includes appendectomy; abdominal hysterectomy total; and tonsillectomy and adenoidectomy.    SOCIAL HISTORY  Social History   Substance  Use Topics   • Smoking status: Never Smoker   • Smokeless tobacco: Never Used   • Alcohol use No      History   Drug Use No       FAMILY HISTORY  Family History   Problem Relation Age of Onset   • Heart Disease Mother    • Lung Disease Father    • Cancer Father 81     lung   • Diabetes Father    • Lung Disease Sister    • Cancer Sister 55     lung   • Diabetes Brother    • Kidney Disease Brother 49     on dialysis       CURRENT MEDICATIONS  Home Medications     Reviewed by Chaitanya Ayala R.N. (Registered Nurse) on 05/04/18 at 1730  Med List Status: Partial   Medication Last Dose Status   cetirizine (ZYRTEC ALLERGY) 10 MG Tab PRN Active   diphenhydrAMINE (BENADRYL) 25 MG capsule PRN Active   fluticasone (FLONASE) 50 MCG/ACT nasal spray not using Active                ALLERGIES  No Known Allergies    PHYSICAL EXAM  VITAL SIGNS: /68   Pulse 69   Temp 37.2 °C (98.9 °F)   Resp 18   Ht 1.524 m (5')   Wt 47.3 kg (104 lb 4.4 oz)   SpO2 98%   BMI 20.37 kg/m²   Vitals reviewed by myself.  Physical Exam  Nursing note and vitals reviewed.  Constitutional: Well-developed and well-nourished. No distress.   HENT: Head is normocephalic and atraumatic. Oropharynx is clear and moist without exudate or erythema.   Eyes: Pupils are equal, round, and reactive to light. No horizontal or vertical nystagmus. Conjunctiva are normal.   Cardiovascular: Normal rate and regular rhythm. No murmur heard. Normal radial pulses.  Pulmonary/Chest: Breath sounds normal. No wheezes or rales.   Abdominal: Soft and non-tender. No distention.    Musculoskeletal: Extremities exhibit normal range of motion without edema or tenderness. Patient ambulates with a normal narrow-based steady gait.   Neurological: Awake, alert and oriented to person, place, and time. No focal deficits noted. Cranial nerves II - XII intact. No pronator drift.  No dysmetria on cerebellar testing. Normal speech and language. Normal strength and sensation in bilateral  upper and lower extremities.   Skin: Skin is warm and dry. No rash.   Psychiatric: Normal mood and affect. Appropriate for clinical situation.      DIAGNOSTIC STUDIES /  LABS  Labs Reviewed   CBC WITH DIFFERENTIAL - Abnormal; Notable for the following:        Result Value    RBC 3.80 (*)     MCV 99.5 (*)     MCH 35.0 (*)     MCHC 35.2 (*)     Lymphocytes 20.30 (*)     All other components within normal limits   COMP METABOLIC PANEL - Abnormal; Notable for the following:     Glucose 100 (*)     Bun 6 (*)     Alkaline Phosphatase 128 (*)     Total Bilirubin 1.9 (*)     All other components within normal limits   POC UA - Abnormal; Notable for the following:     POC Blood Trace-intact (*)     POC Leukocyte Esterase Trace (*)     All other components within normal limits   LIPASE   ESTIMATED GFR      All labs reviewed by me.    COURSE & MEDICAL DECISION MAKING  Nursing notes, VS, PMSFHx reviewed in chart.    Patient is a 62-year-old female who comes in for fatigue and decreased appetite. Differential diagnosis includes malignancy, liver disease, electrolyte disturbance, urinary tract infection, anemia. Diagnostic workup includes labs and urinalysis. Patient appears to be getting the appropriate workup outpatient as her symptoms have been ongoing for years. I advised patient that she is getting the correct work-up and should continue to follow-up, however we will assess for acute causes of fatigue in the emergency department. She is agreeable to this plan.    Patient's initial vitals are within normal limits. She is treated with Reglan for her decreased appetite, and after treatment she feels improved. She is able to tolerate oral intake and feels that it is helping her appetite. Labs returned and are unremarkable. Patient is not anemic. Electrolytes are within normal limits. Patient's alkaline phosphatase and bilirubin are noted to be elevated, however this is consistent with prior labs for which she is getting worked  up. Urinalysis demonstrates no signs of infection. Therefore patient is reassured, advised to continue outpatient follow-up, provided with prescription for Reglan, and given strict return precautions. Patient is then discharged home in stable condition.      FINAL IMPRESSION  1. Other fatigue    2. Decreased appetite

## 2018-05-05 NOTE — DISCHARGE INSTRUCTIONS
Fatigue  Introduction  Fatigue is feeling tired all of the time, a lack of energy, or a lack of motivation. Occasional or mild fatigue is often a normal response to activity or life in general. However, long-lasting (chronic) or extreme fatigue may indicate an underlying medical condition.  Follow these instructions at home:  Watch your fatigue for any changes. The following actions may help to lessen any discomfort you are feeling:  · Talk to your health care provider about how much sleep you need each night. Try to get the required amount every night.  · Take medicines only as directed by your health care provider.  · Eat a healthy and nutritious diet. Ask your health care provider if you need help changing your diet.  · Drink enough fluid to keep your urine clear or pale yellow.  · Practice ways of relaxing, such as yoga, meditation, massage therapy, or acupuncture.  · Exercise regularly.  · Change situations that cause you stress. Try to keep your work and personal routine reasonable.  · Do not abuse illegal drugs.  · Limit alcohol intake to no more than 1 drink per day for nonpregnant women and 2 drinks per day for men. One drink equals 12 ounces of beer, 5 ounces of wine, or 1½ ounces of hard liquor.  · Take a multivitamin, if directed by your health care provider.  Contact a health care provider if:  · Your fatigue does not get better.  · You have a fever.  · You have unintentional weight loss or gain.  · You have headaches.  · You have difficulty:  ¨ Falling asleep.  ¨ Sleeping throughout the night.  · You feel angry, guilty, anxious, or sad.  · You are unable to have a bowel movement (constipation).  · You skin is dry.  · Your legs or another part of your body is swollen.  Get help right away if:  · You feel confused.  · Your vision is blurry.  · You feel faint or pass out.  · You have a severe headache.  · You have severe abdominal, pelvic, or back pain.  · You have chest pain, shortness of breath, or an  irregular or fast heartbeat.  · You are unable to urinate or you urinate less than normal.  · You develop abnormal bleeding, such as bleeding from the rectum, vagina, nose, lungs, or nipples.  · You vomit blood.  · You have thoughts about harming yourself or committing suicide.  · You are worried that you might harm someone else.  This information is not intended to replace advice given to you by your health care provider. Make sure you discuss any questions you have with your health care provider.  Document Released: 10/14/2008 Document Revised: 05/25/2017 Document Reviewed: 04/21/2015  © 2017 [employer]

## 2018-05-07 ENCOUNTER — TELEPHONE (OUTPATIENT)
Dept: MEDICAL GROUP | Facility: MEDICAL CENTER | Age: 63
End: 2018-05-07

## 2018-05-07 NOTE — TELEPHONE ENCOUNTER
ABHAY Coker A.P.N.             No problem. I will see her and do a myeloma work-up but if that is negative then she will need someone else. Maybe rheum or something, not sure.     Colleen    Previous Messages      ----- Message -----   From: ABHAY Gross   Sent: 5/3/2018   9:01 AM   To: ABHAY Coker     i am sending this pt to you.  She is bobbi bright pt but bobbi is out ill.  She is desperate to figure out what is going on with her.

## 2018-05-08 ENCOUNTER — OFFICE VISIT (OUTPATIENT)
Dept: HEMATOLOGY ONCOLOGY | Facility: MEDICAL CENTER | Age: 63
End: 2018-05-08
Payer: COMMERCIAL

## 2018-05-08 ENCOUNTER — TELEPHONE (OUTPATIENT)
Dept: MEDICAL GROUP | Facility: MEDICAL CENTER | Age: 63
End: 2018-05-08

## 2018-05-08 VITALS
HEART RATE: 76 BPM | WEIGHT: 103.51 LBS | OXYGEN SATURATION: 95 % | BODY MASS INDEX: 20.32 KG/M2 | TEMPERATURE: 98.8 F | RESPIRATION RATE: 16 BRPM | DIASTOLIC BLOOD PRESSURE: 54 MMHG | SYSTOLIC BLOOD PRESSURE: 96 MMHG | HEIGHT: 60 IN

## 2018-05-08 DIAGNOSIS — R74.8 ELEVATED ALKALINE PHOSPHATASE LEVEL: ICD-10-CM

## 2018-05-08 DIAGNOSIS — R63.1 POLYDIPSIA: ICD-10-CM

## 2018-05-08 DIAGNOSIS — R63.4 WEIGHT LOSS, NON-INTENTIONAL: ICD-10-CM

## 2018-05-08 DIAGNOSIS — R53.83 OTHER FATIGUE: ICD-10-CM

## 2018-05-08 DIAGNOSIS — R74.8 ELEVATED SERUM ALKALINE PHOSPHATASE LEVEL: ICD-10-CM

## 2018-05-08 PROCEDURE — 99205 OFFICE O/P NEW HI 60 MIN: CPT | Performed by: NURSE PRACTITIONER

## 2018-05-08 ASSESSMENT — ENCOUNTER SYMPTOMS
PALPITATIONS: 0
SHORTNESS OF BREATH: 1
MYALGIAS: 0
SENSORY CHANGE: 0
WEIGHT LOSS: 1
TINGLING: 0
FEVER: 0
VOMITING: 0
DIARRHEA: 0
ABDOMINAL PAIN: 1
INSOMNIA: 0
SORE THROAT: 0
DIAPHORESIS: 1
TREMORS: 0
NAUSEA: 0
COUGH: 1
CONSTIPATION: 0
SPEECH CHANGE: 0
HEADACHES: 0
CHILLS: 0
WEAKNESS: 0
SPUTUM PRODUCTION: 0
DIZZINESS: 1
BACK PAIN: 0
HEMOPTYSIS: 0

## 2018-05-08 NOTE — TELEPHONE ENCOUNTER
Please let pt know that the alk phos isoenzymes shows that her elevation is d/t bone issue not liver issue.  She is going to see Colleen garsia at [facility].  i would also like to do bone scan, po4, pth, vitamin d.  If she is ok with that i will order.  Let me know.     Rest of lab is ok except for elevated t bili and alk phos on cmp that we are aware of.  MCV in cbc is elevated. Will check b12, folate if she is ok with that.    No indication of immunological diseases or multiple myeloma

## 2018-05-08 NOTE — PROGRESS NOTES
"Subjective:      Aminah Maldonado is a 62 y.o. female who presents as a New Patient (Abnormal labs,weight loss and fatigue).        HPI    Patient referred to me, Intake Oncology Coordinator by her PCP SETH Gross for multiple concerns.  Patient is accompanied by her friend for today's visit.    Patient has been undergoing significant symptoms for quite some time. Most significant symptom for patient was swelling in her face and neck associated with shortness of breath. The swelling occurs intermittently and randomly, and according to patient there is no reason as to what brings us on. She stated this has been happening for approximately 2 years but it is more frequent recently. Along with the swelling in her face and neck patient will also develop a rash throughout her body as well as what looks to be scratch marks on her skin. The \"attacks\" scare her and she takes 2 Benadryl initially. The attacks last approximately 2-4 hours. She states that she notices her nose begins to run and her eyes begin to get \"puffy\" and itchy and then the attack happens. She has been seen by an allergist, Dr. Gomes and has undergone significant amount of workup and blood work. According to patient they have not found a reason as to why this is occurring. She has gone to the emergency department in the past at times for this and received steroid and Benadryl injections. Currently patient takes Benadryl every night before bed.     Patient has also been experiencing an elevated alkaline phosphatase dated back to March 2017. She recently did have blood work which fractionated the alkaline phosphatase showing an elevation in the bone fractions. Her PCP has ordered a bone scan.    Patient does complain of weight loss which has been slowly over quite some time. However within the last 2 weeks patient has seen a drastic decrease in her weight approximately 10-15 pounds. Patient stated she has had an immediate decrease in her appetite " "especially in the last 1-2 weeks. Patient was recently seen in the emergency department last week and she was given a prescription for Reglan. She stated she took Reglan twice which did seem to help, however she did stop the medication because she noticed a \"bump\" on her lip and thought it was due to the medication. Patient did have a CT scan of the abdomen and pelvis with contrast back in February 2018. Noted on a CT scan was increase in stool, very small renal hypodensities that likely represented cysts according to the radiologist, and a left lower lobe calcified nodule that likely indicated remote granulomatous disease, according to the radiologist.    Patient feels like she is in a \"fog\" at times and thinks it might be related to anxiety of what's going on. She states she feels lightheaded at times and when that occurs she does not have clear thought. She does tend to get lightheaded after taking a shower. Her blood pressure has been running low. Initially in the office her blood pressure was 96/54, but prior to leaving the office appointment today repeat blood pressure was 126/78. Patient also noting to be feeling very thirsty and unable to go anywhere without water. She is urinating more frequently as well.    She is still currently working as an . She did undergo an EGD last fall. She stated she is due at this time for a screening mammogram and colonoscopy.    Patient has been experiencing significant fatigue. She did state most recently she has been experiencing drenching night sweats, more so in the last week. She denies any fevers or chills. She does have a cough more so in the morning with shortness of breath every once in a while. She denies any chest pain, heart palpitations or swelling in her legs. She does have some abdominal pain in the right upper quadrant which I was able to elicit on examination today. She denies any nausea or vomiting, constipation or diarrhea. However she has been " having loose bowels daily.    Patient has had multiple labs completed recently. I was able to review the labs in detail with the patient today. As mentioned above she has had an elevated alkaline phosphatase, with most recent being at 128. The bone fractions were elevated at 98 and the liver fractions were within normal limits at 65. She did have an SPEP completed which was of normal pattern. Immunoglobulins A, G, and were also within normal limits. UPEP was completed which was negative for monoclonal free light chains (Bence-Duncan protein). She did have free kappa and lambda light chain in the urine which did show a slight elevation of the ratio. She did have a mildly elevated LDH at 267, with normal range at 266.    Please see past medical and surgical history below.    Patient is a never smoker.    She does have a family history of cancer. Her father was diagnosed with lung cancer, and her sister was just recently diagnosed with lung cancer.    No Known Allergies  Current Outpatient Prescriptions on File Prior to Visit   Medication Sig Dispense Refill   • diphenhydrAMINE (BENADRYL) 25 MG capsule Take 25 mg by mouth every 6 hours as needed.     • metoclopramide (REGLAN) 10 MG Tab Take 1 Tab by mouth 4 times a day as needed (appetite/nausea/vomiting). 60 Tab 0   • fluticasone (FLONASE) 50 MCG/ACT nasal spray Spray 1 Spray in nose every day. (Patient not taking: Reported on 11/3/2017) 16 g 0   • cetirizine (ZYRTEC ALLERGY) 10 MG Tab Take 1 Tab by mouth every day. 30 Tab 0     No current facility-administered medications on file prior to visit.      Past Medical History:   Diagnosis Date   • Endometriosis      Past Surgical History:   Procedure Laterality Date   • ABDOMINAL HYSTERECTOMY TOTAL      and BSO for endometriosis   • APPENDECTOMY     • TONSILLECTOMY AND ADENOIDECTOMY       Family History   Problem Relation Age of Onset   • Heart Disease Mother    • Lung Disease Father    • Cancer Father 81     lung   •  Diabetes Father    • Lung Disease Sister    • Cancer Sister 55     lung   • Diabetes Brother    • Kidney Disease Brother 49     on dialysis     Social History     Social History   • Marital status: Single     Spouse name: N/A   • Number of children: 0   • Years of education: N/A     Social History Main Topics   • Smoking status: Never Smoker   • Smokeless tobacco: Never Used   • Alcohol use No      Comment: every once in a while   • Drug use: No   • Sexual activity: No     Other Topics Concern   • Not on file     Social History Narrative   • No narrative on file       Review of Systems   Constitutional: Positive for diaphoresis, malaise/fatigue and weight loss. Negative for chills and fever.   HENT: Negative for congestion and sore throat.    Respiratory: Positive for cough (more so in the morning ) and shortness of breath (every once in a while). Negative for hemoptysis and sputum production.    Cardiovascular: Negative for chest pain, palpitations and leg swelling.   Gastrointestinal: Positive for abdominal pain (RUQ pain just under rib intermittent). Negative for constipation, diarrhea (loose bowels present daily), nausea and vomiting.   Genitourinary: Negative for dysuria.   Musculoskeletal: Negative for back pain and myalgias.   Skin: Positive for itching and rash.   Neurological: Positive for dizziness (lightheadedness at times). Negative for tingling, tremors, sensory change, speech change, weakness and headaches.   Psychiatric/Behavioral: The patient does not have insomnia (she does sleep easily).           Objective:     BP (!) 96/54   Pulse 76   Temp 37.1 °C (98.8 °F)   Resp 16   Ht 1.524 m (5')   Wt 47 kg (103 lb 8.1 oz)   SpO2 95%   BMI 20.21 kg/m²      Physical Exam   Constitutional: She is oriented to person, place, and time. No distress.   Cachetic   HENT:   Head: Normocephalic and atraumatic.   Mouth/Throat: Oropharynx is clear and moist. No oropharyngeal exudate.   Eyes: Conjunctivae and EOM  "are normal. Pupils are equal, round, and reactive to light. Right eye exhibits no discharge. Left eye exhibits no discharge. No scleral icterus.   Neck: Normal range of motion. Neck supple. No thyromegaly present.   Cardiovascular: Normal rate, regular rhythm, normal heart sounds and intact distal pulses.  Exam reveals no gallop and no friction rub.    No murmur heard.  Pulmonary/Chest: Effort normal. No respiratory distress. She has no wheezes.   Diminished lung sounds in the upper lobes   Abdominal: Soft. Bowel sounds are normal. She exhibits no distension. There is tenderness (mild tenderness in the RUQ).   Musculoskeletal: Normal range of motion. She exhibits no edema or tenderness.   Lymphadenopathy:        Head (right side): No submental, no submandibular, no tonsillar, no preauricular, no posterior auricular and no occipital adenopathy present.        Head (left side): No submental, no submandibular, no tonsillar, no preauricular, no posterior auricular and no occipital adenopathy present.     She has cervical adenopathy.        Right cervical: No superficial cervical, no deep cervical and no posterior cervical adenopathy present.       Left cervical: Superficial cervical (noted small prominent lymph, hard and mobile on examination) adenopathy present. No deep cervical and no posterior cervical adenopathy present.     She has no axillary adenopathy.        Right axillary: No pectoral and no lateral adenopathy present.        Left axillary: No pectoral and no lateral adenopathy present.       Right: No inguinal and no supraclavicular adenopathy present.        Left: No inguinal and no supraclavicular adenopathy present.   Neurological: She is alert and oriented to person, place, and time.   Skin: Skin is warm and dry. Rash noted. She is not diaphoretic. There is erythema. No pallor.   Erythema and \"scratches\" noted to the back   Psychiatric: She has a normal mood and affect. Her behavior is normal.   Vitals " reviewed.      Admission on 05/04/2018, Discharged on 05/04/2018   Component Date Value Ref Range Status   • WBC 05/04/2018 7.0  4.8 - 10.8 K/uL Final   • RBC 05/04/2018 3.80* 4.20 - 5.40 M/uL Final   • Hemoglobin 05/04/2018 13.3  12.0 - 16.0 g/dL Final   • Hematocrit 05/04/2018 37.8  37.0 - 47.0 % Final   • MCV 05/04/2018 99.5* 81.4 - 97.8 fL Final   • MCH 05/04/2018 35.0* 27.0 - 33.0 pg Final   • MCHC 05/04/2018 35.2* 33.6 - 35.0 g/dL Final   • RDW 05/04/2018 40.9  35.9 - 50.0 fL Final   • Platelet Count 05/04/2018 377  164 - 446 K/uL Final   • MPV 05/04/2018 9.8  9.0 - 12.9 fL Final   • Neutrophils-Polys 05/04/2018 71.00  44.00 - 72.00 % Final   • Lymphocytes 05/04/2018 20.30* 22.00 - 41.00 % Final   • Monocytes 05/04/2018 6.40  0.00 - 13.40 % Final   • Eosinophils 05/04/2018 1.10  0.00 - 6.90 % Final   • Basophils 05/04/2018 1.10  0.00 - 1.80 % Final   • Immature Granulocytes 05/04/2018 0.10  0.00 - 0.90 % Final   • Nucleated RBC 05/04/2018 0.00  /100 WBC Final   • Neutrophils (Absolute) 05/04/2018 4.94  2.00 - 7.15 K/uL Final    Includes immature neutrophils, if present.   • Lymphs (Absolute) 05/04/2018 1.42  1.00 - 4.80 K/uL Final   • Monos (Absolute) 05/04/2018 0.45  0.00 - 0.85 K/uL Final   • Eos (Absolute) 05/04/2018 0.08  0.00 - 0.51 K/uL Final   • Baso (Absolute) 05/04/2018 0.08  0.00 - 0.12 K/uL Final   • Immature Granulocytes (abs) 05/04/2018 0.01  0.00 - 0.11 K/uL Final   • NRBC (Absolute) 05/04/2018 0.00  K/uL Final   • Sodium 05/04/2018 137  135 - 145 mmol/L Final   • Potassium 05/04/2018 3.6  3.6 - 5.5 mmol/L Final   • Chloride 05/04/2018 103  96 - 112 mmol/L Final   • Co2 05/04/2018 26  20 - 33 mmol/L Final   • Anion Gap 05/04/2018 8.0  0.0 - 11.9 Final   • Glucose 05/04/2018 100* 65 - 99 mg/dL Final   • Bun 05/04/2018 6* 8 - 22 mg/dL Final   • Creatinine 05/04/2018 0.58  0.50 - 1.40 mg/dL Final   • Calcium 05/04/2018 9.3  8.4 - 10.2 mg/dL Final   • AST(SGOT) 05/04/2018 21  12 - 45 U/L Final   •  ALT(SGPT) 05/04/2018 14  2 - 50 U/L Final   • Alkaline Phosphatase 05/04/2018 128* 30 - 99 U/L Final   • Total Bilirubin 05/04/2018 1.9* 0.1 - 1.5 mg/dL Final   • Albumin 05/04/2018 4.5  3.2 - 4.9 g/dL Final   • Total Protein 05/04/2018 6.9  6.0 - 8.2 g/dL Final   • Globulin 05/04/2018 2.4  1.9 - 3.5 g/dL Final   • A-G Ratio 05/04/2018 1.9  g/dL Final   • Lipase 05/04/2018 27  7 - 58 U/L Final   • GFR If  05/04/2018 >60  >60 mL/min/1.73 m 2 Final   • GFR If Non  05/04/2018 >60  >60 mL/min/1.73 m 2 Final   • POC Color 05/04/2018 Yellow   Final   • POC Appearance 05/04/2018 Clear   Final   • POC Glucose 05/04/2018 Negative  Negative mg/dL Final   • POC Ketones 05/04/2018 Negative  Negative mg/dL Final   • POC Specific Gravity 05/04/2018 1.015  1.005 - 1.030 Final   • POC Blood 05/04/2018 Trace-intact* Negative Final   • POC Urine PH 05/04/2018 6.5  5.0 - 8.0 Final   • POC Protein 05/04/2018 Negative  Negative mg/dL Final   • POC Nitrites 05/04/2018 Negative  Negative Final   • POC Leukocyte Esterase 05/04/2018 Trace* Negative Final   Hospital Outpatient Visit on 05/03/2018   Component Date Value Ref Range Status   • Alkaline Phosphatase 05/03/2018 163* 40 - 120 U/L Final   • Liver Fractions 05/03/2018 65  0 - 94 U/L Final    Comment: INTERPRETIVE INFORMATION: Alk-Phosphatase Liver Calc  Bone Specific Alkaline Phosphatase (6236892) and 5'-nucleotidase  ([id number]) may be useful in identifying disorders of bone and  liver, respectively.     • Bone Fractions 05/03/2018 98* 0 - 55 U/L Final   • Alk Phos Other Calc 05/03/2018 0  U/L Final    Comment: Performed by [facility],  [address] [place] [address], [facility] [place] [address] [phone]  [email or web address], Aly Noel MD - Lab. Director     • Antinuclear Antibody 05/03/2018 None Detected  None Detected Final    Comment: No antibodies to Anti-Nuclear Antibodies (EUFEMIA) detected. No  further testing will be performed.  If suspicion of connective tissue  disease is strong and EUFEMIA EIA is  negative, consider testing for EUFEMIA by IFA ([id number]).  INTERPRETIVE INFORMATION: Anti-Nuclear Antibodies (EUFEMIA), IgG by  MAURICE  Anti-Nuclear Antibodies (EUFEMIA), IgG by MAURICE: EUFEMIA specimens are  screened using enzyme-linked immunosorbent assay (MAURICE)  methodology. All MAURICE results reported as Detected are further  tested by indirect fluorescent assay (IFA) using HEp-2 substrate  with an IgG-specific conjugate. The EUFEMIA MAURICE screen is designed  to detect antibodies against dsDNA, histone, SS-A (Ro), SS-B (La),  Suggs, snRNP/Sm, Scl-70, Kelly-1, centromere, and an extract of lysed  HEp-2 cells. EUFEMIA MAURICE assays have been reported to have lower  sensitivities than EUFEMIA IFA for systemic autoimmune rheumatic  diseases (SARD).  Negative results do not necessarily rule out SARD.  Performed by [facility],  [address] [place] [address] 8                           4108 [phone]  [email or web address], Aly Noel MD - Lab. Director     • Sed Rate Westergren 05/03/2018 11  0 - 30 mm/hour Final   • Sodium 05/03/2018 137  135 - 145 mmol/L Final   • Potassium 05/03/2018 4.0  3.6 - 5.5 mmol/L Final   • Chloride 05/03/2018 99  96 - 112 mmol/L Final   • Co2 05/03/2018 28  20 - 33 mmol/L Final   • Anion Gap 05/03/2018 10.0  0.0 - 11.9 Final   • Glucose 05/03/2018 96  65 - 99 mg/dL Final   • Bun 05/03/2018 6* 8 - 22 mg/dL Final   • Creatinine 05/03/2018 0.39* 0.50 - 1.40 mg/dL Final   • Calcium 05/03/2018 9.7  8.5 - 10.5 mg/dL Final   • AST(SGOT) 05/03/2018 16  12 - 45 U/L Final   • ALT(SGPT) 05/03/2018 10  2 - 50 U/L Final   • Alkaline Phosphatase 05/03/2018 130* 30 - 99 U/L Final   • Total Bilirubin 05/03/2018 1.7* 0.1 - 1.5 mg/dL Final   • Albumin 05/03/2018 4.9  3.2 - 4.9 g/dL Final   • Total Protein 05/03/2018 7.7  6.0 - 8.2 g/dL Final   • Globulin 05/03/2018 2.8  1.9 - 3.5 g/dL Final   • A-G Ratio 05/03/2018 1.8  g/dL Final   • Immunoglobulin G 05/03/2018 979  768 - 1632 mg/dL Final    Comment:  REFERENCE INTERVAL: Immunoglobulin G  Access complete set of age- and/or gender-specific reference  intervals for this test in the [facility] Directory  ([email or web address]).     • Immunoglobulin A 05/03/2018 188  68 - 408 mg/dL Final    Comment: REFERENCE INTERVAL: Immunoglobulin A  Access complete set of age- and/or gender-specific reference  intervals for this test in the [facility]  ([email or web address]).     • Immunoglobulin M 05/03/2018 81  35 - 263 mg/dL Final    Comment: REFERENCE INTERVAL: Immunoglobulin M  Access complete set of age- and/or gender-specific reference  intervals for this test in the [facility]  ([email or web address]).  Performed by [facility],  [address] [place] [address],[place] [address] [phone]  [email or web address], Aly Noel MD - Lab. Director     • WBC 05/03/2018 5.7  4.8 - 10.8 K/uL Final   • RBC 05/03/2018 4.17* 4.20 - 5.40 M/uL Final   • Hemoglobin 05/03/2018 14.6  12.0 - 16.0 g/dL Final   • Hematocrit 05/03/2018 42.5  37.0 - 47.0 % Final   • MCV 05/03/2018 101.9* 81.4 - 97.8 fL Final   • MCH 05/03/2018 35.0* 27.0 - 33.0 pg Final   • MCHC 05/03/2018 34.4  33.6 - 35.0 g/dL Final   • RDW 05/03/2018 42.2  35.9 - 50.0 fL Final   • Platelet Count 05/03/2018 416  164 - 446 K/uL Final   • MPV 05/03/2018 10.5  9.0 - 12.9 fL Final   • Neutrophils-Polys 05/03/2018 74.50* 44.00 - 72.00 % Final   • Lymphocytes 05/03/2018 18.30* 22.00 - 41.00 % Final   • Monocytes 05/03/2018 5.40  0.00 - 13.40 % Final   • Eosinophils 05/03/2018 0.70  0.00 - 6.90 % Final   • Basophils 05/03/2018 0.70  0.00 - 1.80 % Final   • Immature Granulocytes 05/03/2018 0.40  0.00 - 0.90 % Final   • Nucleated RBC 05/03/2018 0.00  /100 WBC Final   • Neutrophils (Absolute) 05/03/2018 4.24  2.00 - 7.15 K/uL Final    Includes immature neutrophils, if present.   • Lymphs (Absolute) 05/03/2018 1.04  1.00 - 4.80 K/uL Final   • Monos (Absolute) 05/03/2018 0.31  0.00 - 0.85 K/uL Final   • Eos (Absolute)  05/03/2018 0.04  0.00 - 0.51 K/uL Final   • Baso (Absolute) 05/03/2018 0.04  0.00 - 0.12 K/uL Final   • Immature Granulocytes (abs) 05/03/2018 0.02  0.00 - 0.11 K/uL Final   • NRBC (Absolute) 05/03/2018 0.00  K/uL Final   • GFR If  05/03/2018 >60  >60 mL/min/1.73 m 2 Final   • GFR If Non  05/03/2018 >60  >60 mL/min/1.73 m 2 Final          Assessment/Plan:     1. Other fatigue  CT-CHEST (THORAX) WITH    FREE K&L LT CHAINS, QT, SERUM    CT-SOFT TISSUE NECK WITH   2. Elevated serum alkaline phosphatase level  CT-CHEST (THORAX) WITH    FREE K&L LT CHAINS, QT, SERUM    CT-SOFT TISSUE NECK WITH   3. Weight loss, non-intentional  CT-CHEST (THORAX) WITH    FREE K&L LT CHAINS, QT, SERUM    CT-SOFT TISSUE NECK WITH       Plan  1. Patient with multiple concerns and is being seen today to rule out malignancy. Patient has had significant amount of lab work completed. One last blood work I would like to complete would be serum free kappa and lambda light chains. Patient does have various other blood work that has been ordered by her primary care provider and I have asked patient to complete this labwork as well.    2. Patient has been experiencing significant increased in thirst and frequent urination. Her glucose levels have been good, however I did recommend to PCP to add an A1c to her labs which have been done.    3. Patient did have a CT scan of the abdomen and pelvis in February 2018 which did not show any abnormal findings. She did have a left lower lobe calcified nodule which likely indicated a remote granulomatous disease. Due to significant issues and concerns and severe weight loss most recently I will proceed with a CT scan of the chest for complete workup. Also on examination today was able to appreciate a prominent left anterior cervical lymph node, so I will also proceed with a CT scan of the neck as well. I discussed with patient and I will contact her with the results of the CT scan  to review over the phone. Based on CT results will determine if further workup to rule out malignancy will be needed at that time. Patient is in agreement for a phone call for the results. Patient is scheduled for her CT scan this coming Thursday, May 10.    4. PCP is also ordered a bone scan which we were able to schedule for patient today as well. Unfortunately we were unable to schedule the bone scan until Tuesday, May 22. She is to follow-up with her PCP for those results.    5. At this time I will follow the patient after the CT scan to review the results and discuss if further plan of care or workup is needed. Patient appreciative of discussion today and verbalized understanding of the plan.        Spent 60 minutes in direct, face-to-face patient contact in which greater than 50% of the visit was spent counseling and coordinating of care.       Please note that this dictation was created using voice recognition software. I have made every reasonable attempt to correct obvious errors, but I expect that there are errors of grammar and possibly content that I did not discover before finalizing the note.

## 2018-05-09 ENCOUNTER — TELEPHONE (OUTPATIENT)
Dept: HEMATOLOGY ONCOLOGY | Facility: MEDICAL CENTER | Age: 63
End: 2018-05-09

## 2018-05-09 NOTE — TELEPHONE ENCOUNTER
----- Message from ABHAY Coker sent at 5/9/2018  8:39 AM PDT -----  Can you call patient and let her know that [facility] PCP did order an A1C so she will need to fast for labs. And so she will have 6 labs from [facility] and 1 from me.   I also did mention the letter for jury duty to [facility] but have the patient call her office.

## 2018-05-09 NOTE — TELEPHONE ENCOUNTER
DEJA Coker.  DEJA Gross.             Hi there,     I saw your patient today. I am doing some work-up but I don't think it is cancer, but we will see. I was thinking she should have an A1C because she is having significant increase in thirst and increase in urination. I don't see that one has been done. Glucose has been fine, but maybe that would be something you could order??? I will leave that up to you. She will probably do the other labs you ordered as well as 1 I am ordering either tomorrow or Thursday.     Also, patient has to report to jury duty next Monday and asked if I would write her a note, but I don't give clearance notes like that. I told her I would reach out to you to see if you would be willing to write a note for her.     Hope you are well,   Colleen

## 2018-05-10 ENCOUNTER — HOSPITAL ENCOUNTER (OUTPATIENT)
Dept: LAB | Facility: MEDICAL CENTER | Age: 63
End: 2018-05-10
Attending: NURSE PRACTITIONER
Payer: COMMERCIAL

## 2018-05-10 ENCOUNTER — TELEPHONE (OUTPATIENT)
Dept: MEDICAL GROUP | Facility: MEDICAL CENTER | Age: 63
End: 2018-05-10

## 2018-05-10 ENCOUNTER — HOSPITAL ENCOUNTER (OUTPATIENT)
Dept: RADIOLOGY | Facility: MEDICAL CENTER | Age: 63
End: 2018-05-10
Attending: NURSE PRACTITIONER
Payer: COMMERCIAL

## 2018-05-10 ENCOUNTER — TELEPHONE (OUTPATIENT)
Dept: HEMATOLOGY ONCOLOGY | Facility: MEDICAL CENTER | Age: 63
End: 2018-05-10

## 2018-05-10 DIAGNOSIS — R74.8 ELEVATED SERUM ALKALINE PHOSPHATASE LEVEL: ICD-10-CM

## 2018-05-10 DIAGNOSIS — R63.4 WEIGHT LOSS, NON-INTENTIONAL: ICD-10-CM

## 2018-05-10 DIAGNOSIS — R53.83 OTHER FATIGUE: ICD-10-CM

## 2018-05-10 DIAGNOSIS — R63.1 POLYDIPSIA: ICD-10-CM

## 2018-05-10 DIAGNOSIS — R74.8 ELEVATED ALKALINE PHOSPHATASE LEVEL: ICD-10-CM

## 2018-05-10 LAB
25(OH)D3 SERPL-MCNC: 21 NG/ML (ref 30–100)
CALCIUM SERPL-MCNC: 9.2 MG/DL (ref 8.5–10.5)
EST. AVERAGE GLUCOSE BLD GHB EST-MCNC: 108 MG/DL
FOLATE SERPL-MCNC: 19.9 NG/ML
HBA1C MFR BLD: 5.4 % (ref 0–5.6)
PHOSPHATE SERPL-MCNC: 4.2 MG/DL (ref 2.5–4.5)
PTH-INTACT SERPL-MCNC: 42.4 PG/ML (ref 14–72)
VIT B12 SERPL-MCNC: 321 PG/ML (ref 211–911)

## 2018-05-10 PROCEDURE — 71260 CT THORAX DX C+: CPT

## 2018-05-10 PROCEDURE — 36415 COLL VENOUS BLD VENIPUNCTURE: CPT

## 2018-05-10 PROCEDURE — 82607 VITAMIN B-12: CPT

## 2018-05-10 PROCEDURE — 83883 ASSAY NEPHELOMETRY NOT SPEC: CPT

## 2018-05-10 PROCEDURE — 84100 ASSAY OF PHOSPHORUS: CPT

## 2018-05-10 PROCEDURE — 700117 HCHG RX CONTRAST REV CODE 255: Performed by: NURSE PRACTITIONER

## 2018-05-10 PROCEDURE — 82746 ASSAY OF FOLIC ACID SERUM: CPT

## 2018-05-10 PROCEDURE — 70491 CT SOFT TISSUE NECK W/DYE: CPT

## 2018-05-10 PROCEDURE — 83036 HEMOGLOBIN GLYCOSYLATED A1C: CPT

## 2018-05-10 PROCEDURE — 82306 VITAMIN D 25 HYDROXY: CPT

## 2018-05-10 PROCEDURE — 83970 ASSAY OF PARATHORMONE: CPT

## 2018-05-10 RX ORDER — ERGOCALCIFEROL 1.25 MG/1
50000 CAPSULE ORAL
Qty: 12 CAP | Refills: 0 | Status: SHIPPED | OUTPATIENT
Start: 2018-05-10 | End: 2019-01-26 | Stop reason: CLARIF

## 2018-05-10 RX ADMIN — IOHEXOL 100 ML: 350 INJECTION, SOLUTION INTRAVENOUS at 08:49

## 2018-05-10 NOTE — TELEPHONE ENCOUNTER
i am not sure that i am comfortable with writing that letter.  Why does she feel that she is unable to do jury duty?

## 2018-05-10 NOTE — TELEPHONE ENCOUNTER
Please let pt know that the b12, folate and PTH is wnl.  Vitamin d is low.  Needs 12 weeks of vitamin d replacement once weekly.  Then start over the counter Vitamin D3 at least 2000 units daily.

## 2018-05-11 NOTE — TELEPHONE ENCOUNTER
Patient did complete her CT scan of the chest and neck. There is a very small 5-6 mm right lower lobe pulmonary nodule. She does have some aortic and coronary artery atherosclerotic plaque. She has some hepatic and renal cysts and fatty infiltration of the liver. CT of the neck was within normal limits. I did speak to the patient over the phone with the results. Still waiting on the serum light chain results. Patient is also scheduled for the bone scan on May 22 ordered by her PCP. I am interested in the results of that test as well. I did let the patient know that I will contact her next week once lab results have received for the light chain ratio. Patient was appreciative of the phone call.      Ct-chest (thorax) With    Result Date: 5/10/2018  5/10/2018 8:17 AM HISTORY/REASON FOR EXAM:  unintentional weight loss and fatigue. Right rib/chest pain TECHNIQUE/EXAM DESCRIPTION: CT scan of the chest with contrast. Thin-section helical images were obtained from the lung apices through the adrenal glands following the bolus administration of 100 mL of Omnipaque 350 nonionic contrast. Low dose optimization technique was utilized for this CT exam including automated exposure control and adjustment of the mA and/or kV according to patient size. COMPARISON:  2 view chest 8/28/2017 and CT neck 5/10/2018 FINDINGS: No significant bony abnormality is present. There is a 5-6 mm cyst anterior aspect right lower lobe subpleural pulmonary nodule. Lung parenchyma is otherwise clear. There is no adenopathy or mass within the axilla, mediastinum, or ha. There is aortic and coronary artery atherosclerotic plaque. There no pleur CT of the neck was within normal limits. Al effusion or pneumothoraces. Abdomen: Liver is decreased in density consistent with fatty infiltration. The simple cyst within the liver. There are simple cyst within the right kidney.     1.  5-6 mm right lower lobe subpleural pulmonary nodule 2.  Aortic and coronary  artery atherosclerotic plaque 3.  Hepatic and renal cysts 4.  Fatty infiltration of the liver Primary nodule recommendations: Low Risk: No routine follow-up High Risk: Optional CT at 12 months Comments: Nodules less than 6 mm do not require routine follow-up, but certain patients at high risk with suspicious nodule morphology, upper lobe location, or both may warrant 12-month follow-up. Low Risk - Minimal or absent history of smoking and of other known risk factors. High Risk - History of smoking or of other known risk factors. Note: These recommendations do not apply to lung cancer screening, patients with immunosuppression, or patients with known primary cancer. Fleischner Society 2017 Guidelines for Management of Incidentally Detected Pulmonary Nodules in Adults    Ct-soft Tissue Neck With    Result Date: 5/10/2018  5/10/2018 8:17 AM HISTORY/REASON FOR EXAM:  Facial swelling. Cervical node identified on physical exam Right upper rib and chest pain TECHNIQUE/EXAM DESCRIPTION AND NUMBER OF VIEWS:  CT soft tissue neck with contrast. The study was performed on a helical multidetector CT scanner. Contiguous thin section helical images were obtained of the neck from the skull base through the thoracic inlet. 100 mL of Omnipaque 350 nonionic contrast was injected intravenously. Low dose optimization technique was utilized for this CT exam including automated exposure control and adjustment of the mA and/or kV according to patient size. COMPARISON: CT chest 5/10/2018 FINDINGS: The visualized portions of the brain are normal in appearance. The mastoid air cells and middle ear are clear. The visualized portions of the paranasal sinuses are clear. There is mild endplate spurring of the cervical spine. There is no adenopathy or mass within the neck. The parotid, submandibular, and thyroid gland are normal in appearance. The airway appears patent. The superior mediastinum is normal in appearance. The visualized portions of  lungs are clear.     CT of the neck soft tissues with contrast within normal limits.

## 2018-05-13 LAB
KAPPA LC FREE SER-MCNC: 0.92 MG/DL (ref 0.33–1.94)
KAPPA LC FREE/LAMBDA FREE SER NEPH: 0.69 {RATIO} (ref 0.26–1.65)
LAMBDA LC FREE SERPL-MCNC: 1.33 MG/DL (ref 0.57–2.63)

## 2018-05-14 ENCOUNTER — TELEPHONE (OUTPATIENT)
Dept: HEMATOLOGY ONCOLOGY | Facility: MEDICAL CENTER | Age: 63
End: 2018-05-14

## 2018-05-14 NOTE — TELEPHONE ENCOUNTER
Patient did complete CT neck and chest. CT of the chest showed a 5-6 mm right lower lobe subpleural pulmonary nodule. There was some aortic and coronary artery atherosclerotic plaque, hepatic and renal cysts, and fatty infiltration of the liver. CT scan of the neck was within normal limits. I also did serum free kappa and lambda light chains which were within normal limits as well. At this time there is no concern for malignant process. We will update primary care provider of the results. Patient to follow up with PCP as planned. I did speak to the patient today about the results. Patient did state that she is noticing her rashes returning today and she is monitoring him closely.    No further follow-up with [facility] as needed.        Ct-chest (thorax) With    Result Date: 5/10/2018  5/10/2018 8:17 AM HISTORY/REASON FOR EXAM:  unintentional weight loss and fatigue. Right rib/chest pain TECHNIQUE/EXAM DESCRIPTION: CT scan of the chest with contrast. Thin-section helical images were obtained from the lung apices through the adrenal glands following the bolus administration of 100 mL of Omnipaque 350 nonionic contrast. Low dose optimization technique was utilized for this CT exam including automated exposure control and adjustment of the mA and/or kV according to patient size. COMPARISON:  2 view chest 8/28/2017 and CT neck 5/10/2018 FINDINGS: No significant bony abnormality is present. There is a 5-6 mm cyst anterior aspect right lower lobe subpleural pulmonary nodule. Lung parenchyma is otherwise clear. There is no adenopathy or mass within the axilla, mediastinum, or ha. There is aortic and coronary artery atherosclerotic plaque. There no pleural effusion or pneumothoraces. Abdomen: Liver is decreased in density consistent with fatty infiltration. The simple cyst within the liver. There are simple cyst within the right kidney.     1.  5-6 mm right lower lobe subpleural pulmonary nodule 2.  Aortic and coronary artery  atherosclerotic plaque 3.  Hepatic and renal cysts 4.  Fatty infiltration of the liver Primary nodule recommendations: Low Risk: No routine follow-up High Risk: Optional CT at 12 months Comments: Nodules less than 6 mm do not require routine follow-up, but certain patients at high risk with suspicious nodule morphology, upper lobe location, or both may warrant 12-month follow-up. Low Risk - Minimal or absent history of smoking and of other known risk factors. High Risk - History of smoking or of other known risk factors. Note: These recommendations do not apply to lung cancer screening, patients with immunosuppression, or patients with known primary cancer. Fleischner Society 2017 Guidelines for Management of Incidentally Detected Pulmonary Nodules in Adults    Ct-soft Tissue Neck With    Result Date: 5/10/2018  5/10/2018 8:17 AM HISTORY/REASON FOR EXAM:  Facial swelling. Cervical node identified on physical exam Right upper rib and chest pain TECHNIQUE/EXAM DESCRIPTION AND NUMBER OF VIEWS:  CT soft tissue neck with contrast. The study was performed on a helical multidetector CT scanner. Contiguous thin section helical images were obtained of the neck from the skull base through the thoracic inlet. 100 mL of Omnipaque 350 nonionic contrast was injected intravenously. Low dose optimization technique was utilized for this CT exam including automated exposure control and adjustment of the mA and/or kV according to patient size. COMPARISON: CT chest 5/10/2018 FINDINGS: The visualized portions of the brain are normal in appearance. The mastoid air cells and middle ear are clear. The visualized portions of the paranasal sinuses are clear. There is mild endplate spurring of the cervical spine. There is no adenopathy or mass within the neck. The parotid, submandibular, and thyroid gland are normal in appearance. The airway appears patent. The superior mediastinum is normal in appearance. The visualized portions of lungs are  clear.     CT of the neck soft tissues with contrast within normal limits.

## 2018-05-15 ENCOUNTER — TELEPHONE (OUTPATIENT)
Dept: MEDICAL GROUP | Facility: MEDICAL CENTER | Age: 63
End: 2018-05-15

## 2018-05-15 NOTE — TELEPHONE ENCOUNTER
Thank you very much  ===View-only below this line===    ----- Message -----  From: ABHAY Coker  Sent: 5/15/2018   4:24 PM  To: ABHAY Gross  Subject: [facility] patient                                      After further workup looks like there is no concern for cancer. I was discussing the case with Dr. Velazquez and he stated that the bone scan was the last thing that he can think of to do to get some information or answers. He thought that symptoms could be pointing towards Paget's disease as well. But, there is no need for further follow-up with me.

## 2018-05-16 ENCOUNTER — TELEPHONE (OUTPATIENT)
Dept: MEDICAL GROUP | Facility: MEDICAL CENTER | Age: 63
End: 2018-05-16

## 2018-05-16 ENCOUNTER — OFFICE VISIT (OUTPATIENT)
Dept: URGENT CARE | Facility: PHYSICIAN GROUP | Age: 63
End: 2018-05-16
Payer: COMMERCIAL

## 2018-05-16 VITALS
TEMPERATURE: 97.9 F | RESPIRATION RATE: 13 BRPM | HEART RATE: 77 BPM | DIASTOLIC BLOOD PRESSURE: 78 MMHG | HEIGHT: 60 IN | SYSTOLIC BLOOD PRESSURE: 118 MMHG | OXYGEN SATURATION: 98 % | WEIGHT: 105 LBS | BODY MASS INDEX: 20.62 KG/M2

## 2018-05-16 DIAGNOSIS — H10.12 ACUTE ATOPIC CONJUNCTIVITIS OF LEFT EYE: ICD-10-CM

## 2018-05-16 PROCEDURE — 99202 OFFICE O/P NEW SF 15 MIN: CPT | Performed by: EMERGENCY MEDICINE

## 2018-05-16 RX ORDER — ERGOCALCIFEROL 1.25 MG/1
50000 CAPSULE ORAL
Qty: 12 CAP | Refills: 0 | Status: SHIPPED | OUTPATIENT
Start: 2018-05-16 | End: 2018-05-30

## 2018-05-16 RX ORDER — KETOROLAC TROMETHAMINE 5 MG/ML
1 SOLUTION OPHTHALMIC 4 TIMES DAILY
Qty: 1 BOTTLE | Refills: 0 | Status: SHIPPED | OUTPATIENT
Start: 2018-05-16 | End: 2018-05-30

## 2018-05-16 ASSESSMENT — ENCOUNTER SYMPTOMS
EYE DISCHARGE: 0
EYE REDNESS: 1
FOREIGN BODY SENSATION: 0
EYE PAIN: 0
NAUSEA: 0
EYE ITCHING: 1
PHOTOPHOBIA: 0
FEVER: 0
VOMITING: 0
DOUBLE VISION: 0
BLURRED VISION: 0

## 2018-05-16 NOTE — LETTER
May 23, 2018        Aminah Maldonado  [address]  [place] [address]        Dear Aminah:    [facility]'s office has tried contacting you. At your earliest convenience please contact our office at [phone].      If you have any questions or concerns, please don't hesitate to call.        Sincerely,        DEJA Gross.    Electronically Signed

## 2018-05-17 NOTE — PROGRESS NOTES
Subjective:      Aminah Maldonado is a 62 y.o. female who presents with Eye Problem (Redness and irritation. left eye.)            Eye Problem    The left eye is affected. This is a recurrent problem. Episode onset: 3 days. The problem has been unchanged. There was no injury mechanism. The patient is experiencing no pain. There is no known exposure to pink eye. She does not wear contacts. Associated symptoms include eye redness and itching. Pertinent negatives include no blurred vision, eye discharge, double vision, fever, foreign body sensation, nausea, photophobia, recent URI or vomiting. Treatments tried: compresses. The treatment provided mild relief.   PMH significant for angioedema of uncertain origin.    Review of Systems   Constitutional: Negative for fever.   Eyes: Positive for redness and itching. Negative for blurred vision, double vision, photophobia, pain and discharge.   Gastrointestinal: Negative for nausea and vomiting.   Skin: Negative for rash.     PMH:  has a past medical history of Endometriosis.  MEDS:   Current Outpatient Prescriptions:   •  ketorolac (ACULAR) 0.5 % Solution, Place 1 Drop in both eyes 4 times a day., Disp: 1 Bottle, Rfl: 0  •  diphenhydrAMINE (BENADRYL) 25 MG capsule, Take 25 mg by mouth every 6 hours as needed., Disp: , Rfl:   •  vitamin D, Ergocalciferol, (DRISDOL) 11643 units Cap capsule, Take 1 Cap by mouth every 7 days., Disp: 12 Cap, Rfl: 0  •  vitamin D, Ergocalciferol, (DRISDOL) 00793 units Cap capsule, Take 1 Cap by mouth every 7 days., Disp: 12 Cap, Rfl: 0  •  metoclopramide (REGLAN) 10 MG Tab, Take 1 Tab by mouth 4 times a day as needed (appetite/nausea/vomiting)., Disp: 60 Tab, Rfl: 0  •  fluticasone (FLONASE) 50 MCG/ACT nasal spray, Spray 1 Spray in nose every day. (Patient not taking: Reported on 11/3/2017), Disp: 16 g, Rfl: 0  •  cetirizine (ZYRTEC ALLERGY) 10 MG Tab, Take 1 Tab by mouth every day., Disp: 30 Tab, Rfl: 0  ALLERGIES: No Known Allergies  SURGHX:    Past Surgical History:   Procedure Laterality Date   • ABDOMINAL HYSTERECTOMY TOTAL      and BSO for endometriosis   • APPENDECTOMY     • TONSILLECTOMY AND ADENOIDECTOMY       SOCHX:  reports that she has never smoked. She has never used smokeless tobacco. She reports that she does not drink alcohol or use drugs.  FH: family history includes Cancer (age of onset: 55) in her sister; Cancer (age of onset: 81) in her father; Diabetes in her brother and father; Heart Disease in her mother; Kidney Disease (age of onset: 49) in her brother; Lung Disease in her father and sister.       Objective:     /78   Pulse 77   Temp 36.6 °C (97.9 °F)   Resp 13   Ht 1.524 m (5')   Wt 47.6 kg (105 lb)   SpO2 98%   BMI 20.51 kg/m²      Physical Exam   Constitutional: She is oriented to person, place, and time. Vital signs are normal. She appears well-developed and well-nourished. She is cooperative. She does not have a sickly appearance. She does not appear ill.   HENT:   Head: Normocephalic.   Nose: No rhinorrhea.   Mouth/Throat: Mucous membranes are normal.   Eyes: Lids are normal. Pupils are equal, round, and reactive to light. Left eye exhibits no chemosis, no discharge, no exudate and no hordeolum. No foreign body present in the left eye. Left conjunctiva is injected. Left conjunctiva has no hemorrhage. No scleral icterus. Left eye exhibits normal extraocular motion.   Moderate left lower lateral conjunctival edema, mostly bulbar, some cobblestoning to the palpebral conjunctiva.   Lymphadenopathy:        Head (left side): No preauricular adenopathy present.   Neurological: She is alert and oriented to person, place, and time.   Skin: Skin is warm and dry. No rash noted.   Psychiatric: She has a normal mood and affect.               Assessment/Plan:     1. Acute atopic conjunctivitis of left eye  OTC cetirizine when necessary.  OTC ketotifen when necessary; if not helpful then:  - ketorolac (ACULAR) 0.5 % Solution;  Place 1 Drop in both eyes 4 times a day.  Dispense: 1 Bottle; Refill: 0  - REFERRAL TO OPHTHALMOLOGY

## 2018-05-17 NOTE — TELEPHONE ENCOUNTER
Please let pt know that the a1c, po4, folate, b12, PTH is wnl.    Vitamin d is low.  Needs 12 weeks of vitamin d replacement once weekly.  Then start over the counter Vitamin D3 at least 2000 units daily.

## 2018-05-17 NOTE — PATIENT INSTRUCTIONS
Ketotifen eye solution  What is this medicine?  KETOTIFEN (dory toe MIRIAN fen) eye drops are used in the eye to prevent itching that is caused by allergies.  This medicine may be used for other purposes; ask your health care provider or pharmacist if you have questions.  COMMON BRAND NAME(S): Alaway, Children's Alaway, Claritin Eye, Eye Itch Relief, Itchy Eye, Zaditor, Zyrtec Itchy Eye  What should I tell my health care provider before I take this medicine?  They need to know if you have any of these conditions:  -wear contact lenses  -an unusual or allergic reaction to ketotifen, other medicines, foods, dyes, or preservatives  -pregnant or trying to get pregnant  -breast-feeding  How should I use this medicine?  This medicine is only for use in the eye. Do not take by mouth. Follow the directions on the prescription label. Wash hands before and after use. Tilt the head back slightly and pull down the lower eyelid with the index finger to form a pouch. Try not to touch the tip of the dropper to your eye, fingertips, or any other surface. Squeeze the prescribed number of drops into the pouch. Close the eye gently to spread the drops. Your vision may blur for a few minutes. Use your doses at regular intervals. Do not use your medicine more often than directed. If you use other eye medicines, they should be used at least 10 minutes before or after this medicine. Eye ointments should be applied last.  Talk to your pediatrician regarding the use of this medicine in children. Special care may be needed. While this drug may be prescribed for children as young as 3 years of age for selected conditions, precautions do apply.  Overdosage: If you think you have taken too much of this medicine contact a poison control center or emergency room at once.  NOTE: This medicine is only for you. Do not share this medicine with others.  What if I miss a dose?  If you miss a dose, use it as soon as you can. If it is almost time for  your next dose, use only that dose. Do not use double or extra doses.  What may interact with this medicine?  Interactions are not expected. Do not use any other eye products without telling your doctor or health care professional.  This list may not describe all possible interactions. Give your health care provider a list of all the medicines, herbs, non-prescription drugs, or dietary supplements you use. Also tell them if you smoke, drink alcohol, or use illegal drugs. Some items may interact with your medicine.  What should I watch for while using this medicine?  Tell your doctor or health care professional if your symptoms do not start to improve in 2 or 3 days.  Report any serious side effects promptly. Stop using this medicine if your eyes get swollen, painful, or have a discharge, and see your doctor or health care professional as soon as you can.  Contact lenses may be inserted 10 minutes after putting the medicine in the eye. Do not wear contact lenses if your eyes are red. You should not use this medicine to treat irritation that is caused by contact lenses.  What side effects may I notice from receiving this medicine?  Side effects that you should report to your doctor or health care professional as soon as possible:  -skin rash, hives, or itching  -swelling of the lips, tongue or face  Side effects that usually do not require medical attention (report to your doctor or health care professional if they continue or are bothersome):  -burning, discomfort, stinging, or tearing immediately after use  -eyelid swelling or eye dryness  -headache  -sensitivity of the eyes to sunlight  This list may not describe all possible side effects. Call your doctor for medical advice about side effects. You may report side effects to FDA at 7-426-FDA-4893.  Where should I keep my medicine?  Keep out of the reach of children.  Store between 4 and 25 degrees C (39 and 77 degrees F). Do not freeze. Protect from light. Throw  away any unused medicine after the expiration date.  NOTE: This sheet is a summary. It may not cover all possible information. If you have questions about this medicine, talk to your doctor, pharmacist, or health care provider.  © 2018 [employer]vier/Gold Standard (2009-06-19 14:36:34)

## 2018-05-22 ENCOUNTER — HOSPITAL ENCOUNTER (OUTPATIENT)
Dept: RADIOLOGY | Facility: MEDICAL CENTER | Age: 63
End: 2018-05-22
Attending: NURSE PRACTITIONER
Payer: COMMERCIAL

## 2018-05-22 DIAGNOSIS — R74.8 ELEVATED ALKALINE PHOSPHATASE LEVEL: ICD-10-CM

## 2018-05-22 PROCEDURE — A9503 TC99M MEDRONATE: HCPCS

## 2018-05-30 ENCOUNTER — OFFICE VISIT (OUTPATIENT)
Dept: MEDICAL GROUP | Facility: MEDICAL CENTER | Age: 63
End: 2018-05-30
Payer: COMMERCIAL

## 2018-05-30 VITALS
DIASTOLIC BLOOD PRESSURE: 64 MMHG | HEIGHT: 60 IN | OXYGEN SATURATION: 96 % | WEIGHT: 103 LBS | BODY MASS INDEX: 20.22 KG/M2 | HEART RATE: 84 BPM | SYSTOLIC BLOOD PRESSURE: 100 MMHG | TEMPERATURE: 98.5 F

## 2018-05-30 DIAGNOSIS — Z12.31 ENCOUNTER FOR SCREENING MAMMOGRAM FOR MALIGNANT NEOPLASM OF BREAST: ICD-10-CM

## 2018-05-30 DIAGNOSIS — E55.9 VITAMIN D DEFICIENCY: ICD-10-CM

## 2018-05-30 DIAGNOSIS — R74.8 ELEVATED ALKALINE PHOSPHATASE LEVEL: ICD-10-CM

## 2018-05-30 DIAGNOSIS — R21 RASH: ICD-10-CM

## 2018-05-30 DIAGNOSIS — R22.0 SWELLING OF FACE: ICD-10-CM

## 2018-05-30 DIAGNOSIS — T78.40XA ALLERGIC REACTION, INITIAL ENCOUNTER: ICD-10-CM

## 2018-05-30 PROCEDURE — 99214 OFFICE O/P EST MOD 30 MIN: CPT | Performed by: NURSE PRACTITIONER

## 2018-05-30 RX ORDER — MONTELUKAST SODIUM 10 MG/1
10 TABLET ORAL DAILY
Qty: 30 TAB | Refills: 3 | Status: SHIPPED | OUTPATIENT
Start: 2018-05-30 | End: 2019-01-26 | Stop reason: CLARIF

## 2018-05-30 NOTE — PROGRESS NOTES
Subjective:     Aminah Maldonado is a 62 y.o. female who presents with swelling in face.    HPI:   Seen in f/u for swelling in the face.  It is associated with decreased appetite and rash.  Has been to ER 5x for these sx. No dx found yet.  Rash occurs all over the body.  Ears get red and burning.  Pain in back of ears. No correlation to activitiies.  Family has also tried to correlate activities w/o any correlation.  Last episode was last sat nite after eating ice cream and moving.  Left eye was inflammed.  She used antihisatmine.    When she first started getting the rash on her back.  She hadn't scratched it but it looked like scratches.    She is also having under rt rib pain.  there all the time.  No correlation to food.     Reviewed all her lab and testing extensively with pt and family.  Her vitamin d is low.  seh is on ergocalciferol. She has been evaluated by [facility] but no dx found.    Alk phos has been elevated in the past. She had isoenzymes with + for bone and not liver.  She has DDD cervical spine.    Bone scan was wnl.   Ct chest showed pulm nodule x 1.  Will recheck 1 yr.  Nonsmoker.  No current SOB or cough.   CT neck is wnl.   She doesn't have any test results consist with cancer or immunological disease.    a1c, PTH, PO4, FOLATE, B12, free K&L lite chains, SPEP, C3, C4, CH50, EUFEMIA, GFR, CBC, immunoglobulins, ESR is wnl.           Patient Active Problem List    Diagnosis Date Noted   • Allergic reaction 02/06/2018   • Pain 02/06/2018   • Angioedema 01/02/2018   • Idiopathic hypotension 09/21/2017   • DDD (degenerative disc disease), thoracic 09/21/2017   • Radicular pain of thoracic region 08/28/2017   • Right upper quadrant pain 08/04/2017   • Cough 08/04/2017   • Dizziness 08/04/2017   • SOB (shortness of breath) 08/04/2017   • Uncontrolled daytime somnolence 05/26/2017   • Elevated serum alkaline phosphatase level 05/26/2017   • Macrocytosis without anemia 05/26/2017   • Acute non-recurrent  maxillary sinusitis 02/09/2017   • Fatigue 02/09/2017       Current medicines (including changes today)  Current Outpatient Prescriptions   Medication Sig Dispense Refill   • montelukast (SINGULAIR) 10 MG Tab Take 1 Tab by mouth every day. 30 Tab 3   • vitamin D, Ergocalciferol, (DRISDOL) 05106 units Cap capsule Take 1 Cap by mouth every 7 days. 12 Cap 0   • metoclopramide (REGLAN) 10 MG Tab Take 1 Tab by mouth 4 times a day as needed (appetite/nausea/vomiting). 60 Tab 0   • diphenhydrAMINE (BENADRYL) 25 MG capsule Take 25 mg by mouth every 6 hours as needed.       No current facility-administered medications for this visit.        No Known Allergies    ROS  Constitutional: Negative. Negative for fever, chills, weight loss, malaise/fatigue and diaphoresis.   HENT: Negative. Negative for hearing loss, ear pain, nosebleeds, congestion, sore throat, neck pain, tinnitus and ear discharge.   Respiratory: Negative. Negative for cough, hemoptysis, sputum production, shortness of breath, wheezing and stridor.   Cardiovascular: Negative. Negative for chest pain, palpitations, orthopnea, claudication, leg swelling and PND.        Objective:     Blood pressure 100/64, pulse 84, temperature 36.9 °C (98.5 °F), height 1.524 m (5'), weight 46.7 kg (103 lb), SpO2 96 %, not currently breastfeeding. Body mass index is 20.12 kg/m².    Physical Exam:  Vitals reviewed.  Constitutional: Oriented to person, place, and time. appears well-developed and well-nourished. No distress.   Cardiovascular: Normal rate, regular rhythm, normal heart sounds and intact distal pulses. Exam reveals no gallop and no friction rub. No murmur heard. No carotid bruits.   Pulmonary/Chest: Effort normal and breath sounds normal. No stridor. No respiratory distress. no wheezes or rales. exhibits no tenderness.   Musculoskeletal: Normal range of motion. exhibits no edema. sussy pedal pulses 2+.  Lymphadenopathy: No cervical or supraclavicular adenopathy.    Neurological: Alert and oriented to person, place, and time. exhibits normal muscle tone.  Skin: Skin is warm and dry. No diaphoresis.   Psychiatric: Normal mood and affect. Behavior is normal.      Assessment and Plan:     The following treatment plan was discussed:    1. Allergic reaction, initial encounter  REFERRAL TO ALLERGY    REFERRAL TO DERMATOLOGY    montelukast (SINGULAIR) 10 MG Tab    refer [facility] for derm and allergy since sx are most consistent with that dx.  will see derm d/t rash.     2. Swelling of face     3. Rash      refer [facility] derm   4. Vitamin D deficiency      continue ergocalciferol x 12 weeks then d3 2000 units daily   5. Elevated alkaline phosphatase level      d/t OA   6. Encounter for screening mammogram for malignant neoplasm of breast  MA-SCREEN MAMMO W/CAD-BILAT     Patient was seen for 20 minutes face to face of which > 50% of appointment time was spent on counseling and coordination of care regarding the above.      Followup: Return if symptoms worsen or fail to improve.

## 2018-06-18 DIAGNOSIS — R10.9 RIGHT SIDED ABDOMINAL PAIN: ICD-10-CM

## 2018-06-30 ENCOUNTER — HOSPITAL ENCOUNTER (OUTPATIENT)
Dept: RADIOLOGY | Facility: MEDICAL CENTER | Age: 63
End: 2018-06-30
Attending: NURSE PRACTITIONER
Payer: COMMERCIAL

## 2018-06-30 DIAGNOSIS — R10.11 ABDOMINAL PAIN, RIGHT UPPER QUADRANT: ICD-10-CM

## 2018-06-30 DIAGNOSIS — R10.13 ABDOMINAL PAIN, EPIGASTRIC: ICD-10-CM

## 2018-06-30 PROCEDURE — 76700 US EXAM ABDOM COMPLETE: CPT

## 2018-07-11 ENCOUNTER — HOSPITAL ENCOUNTER (OUTPATIENT)
Dept: RADIOLOGY | Facility: MEDICAL CENTER | Age: 63
End: 2018-07-11
Attending: NURSE PRACTITIONER
Payer: COMMERCIAL

## 2018-07-11 DIAGNOSIS — R10.11 ABDOMINAL PAIN, RIGHT UPPER QUADRANT: ICD-10-CM

## 2018-07-11 DIAGNOSIS — R10.13 ABDOMINAL PAIN, EPIGASTRIC: ICD-10-CM

## 2018-07-11 PROCEDURE — 78227 HEPATOBIL SYST IMAGE W/DRUG: CPT

## 2018-09-15 ENCOUNTER — HOSPITAL ENCOUNTER (OUTPATIENT)
Dept: LAB | Facility: MEDICAL CENTER | Age: 63
End: 2018-09-15
Payer: COMMERCIAL

## 2018-09-15 LAB
ALBUMIN SERPL BCP-MCNC: 4.7 G/DL (ref 3.2–4.9)
ALBUMIN/GLOB SERPL: 2 G/DL
ALP SERPL-CCNC: 178 U/L (ref 30–99)
ALT SERPL-CCNC: 14 U/L (ref 2–50)
ANION GAP SERPL CALC-SCNC: 8 MMOL/L (ref 0–11.9)
AST SERPL-CCNC: 18 U/L (ref 12–45)
BASOPHILS # BLD AUTO: 1.1 % (ref 0–1.8)
BASOPHILS # BLD: 0.06 K/UL (ref 0–0.12)
BILIRUB SERPL-MCNC: 2.1 MG/DL (ref 0.1–1.5)
BUN SERPL-MCNC: 8 MG/DL (ref 8–22)
CALCIUM SERPL-MCNC: 9.3 MG/DL (ref 8.5–10.5)
CHLORIDE SERPL-SCNC: 102 MMOL/L (ref 96–112)
CO2 SERPL-SCNC: 28 MMOL/L (ref 20–33)
CREAT SERPL-MCNC: 0.62 MG/DL (ref 0.5–1.4)
EOSINOPHIL # BLD AUTO: 0.11 K/UL (ref 0–0.51)
EOSINOPHIL NFR BLD: 2.1 % (ref 0–6.9)
ERYTHROCYTE [DISTWIDTH] IN BLOOD BY AUTOMATED COUNT: 42.7 FL (ref 35.9–50)
GLOBULIN SER CALC-MCNC: 2.3 G/DL (ref 1.9–3.5)
GLUCOSE SERPL-MCNC: 96 MG/DL (ref 65–99)
HCT VFR BLD AUTO: 39.9 % (ref 37–47)
HGB BLD-MCNC: 13.6 G/DL (ref 12–16)
IMM GRANULOCYTES # BLD AUTO: 0.01 K/UL (ref 0–0.11)
IMM GRANULOCYTES NFR BLD AUTO: 0.2 % (ref 0–0.9)
LYMPHOCYTES # BLD AUTO: 1.44 K/UL (ref 1–4.8)
LYMPHOCYTES NFR BLD: 27.1 % (ref 22–41)
MCH RBC QN AUTO: 35 PG (ref 27–33)
MCHC RBC AUTO-ENTMCNC: 34.1 G/DL (ref 33.6–35)
MCV RBC AUTO: 102.6 FL (ref 81.4–97.8)
MONOCYTES # BLD AUTO: 0.37 K/UL (ref 0–0.85)
MONOCYTES NFR BLD AUTO: 7 % (ref 0–13.4)
NEUTROPHILS # BLD AUTO: 3.32 K/UL (ref 2–7.15)
NEUTROPHILS NFR BLD: 62.5 % (ref 44–72)
NRBC # BLD AUTO: 0 K/UL
NRBC BLD-RTO: 0 /100 WBC
PLATELET # BLD AUTO: 362 K/UL (ref 164–446)
PMV BLD AUTO: 10.8 FL (ref 9–12.9)
POTASSIUM SERPL-SCNC: 4 MMOL/L (ref 3.6–5.5)
PROT SERPL-MCNC: 7 G/DL (ref 6–8.2)
RBC # BLD AUTO: 3.89 M/UL (ref 4.2–5.4)
SODIUM SERPL-SCNC: 138 MMOL/L (ref 135–145)
TSH SERPL DL<=0.005 MIU/L-ACNC: 1.17 UIU/ML (ref 0.38–5.33)
WBC # BLD AUTO: 5.3 K/UL (ref 4.8–10.8)

## 2018-09-15 PROCEDURE — 84443 ASSAY THYROID STIM HORMONE: CPT

## 2018-09-15 PROCEDURE — 83520 IMMUNOASSAY QUANT NOS NONAB: CPT

## 2018-09-15 PROCEDURE — 80053 COMPREHEN METABOLIC PANEL: CPT

## 2018-09-15 PROCEDURE — 36415 COLL VENOUS BLD VENIPUNCTURE: CPT

## 2018-09-15 PROCEDURE — 86038 ANTINUCLEAR ANTIBODIES: CPT

## 2018-09-15 PROCEDURE — 85025 COMPLETE CBC W/AUTO DIFF WBC: CPT

## 2018-09-17 ENCOUNTER — HOSPITAL ENCOUNTER (OUTPATIENT)
Facility: MEDICAL CENTER | Age: 63
End: 2018-09-17
Attending: PEDIATRICS
Payer: COMMERCIAL

## 2018-09-18 LAB
NUCLEAR IGG SER QL IA: NORMAL
TRYPTASE SERPL-MCNC: 3.4 UG/L

## 2018-09-24 DIAGNOSIS — E23.7 PITUITARY ABNORMALITY (HCC): ICD-10-CM

## 2018-09-26 DIAGNOSIS — E23.7 PITUITARY ABNORMALITY (HCC): ICD-10-CM

## 2018-09-27 ENCOUNTER — HOSPITAL ENCOUNTER (OUTPATIENT)
Dept: LAB | Facility: MEDICAL CENTER | Age: 63
End: 2018-09-27
Attending: DERMATOLOGY
Payer: COMMERCIAL

## 2018-09-27 ENCOUNTER — APPOINTMENT (RX ONLY)
Dept: URBAN - METROPOLITAN AREA CLINIC 4 | Facility: CLINIC | Age: 63
Setting detail: DERMATOLOGY
End: 2018-09-27

## 2018-09-27 DIAGNOSIS — D22 MELANOCYTIC NEVI: ICD-10-CM

## 2018-09-27 DIAGNOSIS — L29.8 OTHER PRURITUS: ICD-10-CM

## 2018-09-27 DIAGNOSIS — L81.4 OTHER MELANIN HYPERPIGMENTATION: ICD-10-CM

## 2018-09-27 DIAGNOSIS — L29.89 OTHER PRURITUS: ICD-10-CM

## 2018-09-27 DIAGNOSIS — D18.0 HEMANGIOMA: ICD-10-CM

## 2018-09-27 PROBLEM — D22.5 MELANOCYTIC NEVI OF TRUNK: Status: ACTIVE | Noted: 2018-09-27

## 2018-09-27 PROBLEM — L29.9 PRURITUS, UNSPECIFIED: Status: ACTIVE | Noted: 2018-09-27

## 2018-09-27 PROBLEM — D18.01 HEMANGIOMA OF SKIN AND SUBCUTANEOUS TISSUE: Status: ACTIVE | Noted: 2018-09-27

## 2018-09-27 PROBLEM — D22.71 MELANOCYTIC NEVI OF RIGHT LOWER LIMB, INCLUDING HIP: Status: ACTIVE | Noted: 2018-09-27

## 2018-09-27 PROBLEM — D22.72 MELANOCYTIC NEVI OF LEFT LOWER LIMB, INCLUDING HIP: Status: ACTIVE | Noted: 2018-09-27

## 2018-09-27 LAB — ERYTHROCYTE [SEDIMENTATION RATE] IN BLOOD BY WESTERGREN METHOD: 2 MM/HOUR (ref 0–30)

## 2018-09-27 PROCEDURE — 82746 ASSAY OF FOLIC ACID SERUM: CPT

## 2018-09-27 PROCEDURE — ? ORDER TESTS

## 2018-09-27 PROCEDURE — 84165 PROTEIN E-PHORESIS SERUM: CPT

## 2018-09-27 PROCEDURE — 84439 ASSAY OF FREE THYROXINE: CPT

## 2018-09-27 PROCEDURE — 83516 IMMUNOASSAY NONANTIBODY: CPT | Mod: 91

## 2018-09-27 PROCEDURE — ? PRESCRIPTION

## 2018-09-27 PROCEDURE — 82728 ASSAY OF FERRITIN: CPT

## 2018-09-27 PROCEDURE — 84443 ASSAY THYROID STIM HORMONE: CPT

## 2018-09-27 PROCEDURE — 85652 RBC SED RATE AUTOMATED: CPT

## 2018-09-27 PROCEDURE — 84160 ASSAY OF PROTEIN ANY SOURCE: CPT

## 2018-09-27 PROCEDURE — 86038 ANTINUCLEAR ANTIBODIES: CPT

## 2018-09-27 PROCEDURE — 86803 HEPATITIS C AB TEST: CPT

## 2018-09-27 PROCEDURE — 36415 COLL VENOUS BLD VENIPUNCTURE: CPT

## 2018-09-27 PROCEDURE — 11100: CPT

## 2018-09-27 PROCEDURE — ? BIOPSY BY SHAVE METHOD

## 2018-09-27 PROCEDURE — 99203 OFFICE O/P NEW LOW 30 MIN: CPT | Mod: 25

## 2018-09-27 PROCEDURE — ? COUNSELING

## 2018-09-27 PROCEDURE — 82607 VITAMIN B-12: CPT

## 2018-09-27 PROCEDURE — ? LAB REPORTS REVIEWED

## 2018-09-27 RX ORDER — FLUOCINOLONE ACETONIDE 0.11 MG/ML
OIL TOPICAL
Qty: 1 | Refills: 2 | Status: ERX | COMMUNITY
Start: 2018-09-27

## 2018-09-27 RX ADMIN — FLUOCINOLONE ACETONIDE: 0.11 OIL TOPICAL at 16:58

## 2018-09-27 ASSESSMENT — LOCATION DETAILED DESCRIPTION DERM
LOCATION DETAILED: LEFT LATERAL SUPERIOR CHEST
LOCATION DETAILED: LEFT PROXIMAL CALF
LOCATION DETAILED: RIGHT PROXIMAL PRETIBIAL REGION
LOCATION DETAILED: RIGHT ANTERIOR SHOULDER
LOCATION DETAILED: LEFT SUPERIOR MEDIAL UPPER BACK
LOCATION DETAILED: RIGHT SUPERIOR MEDIAL LOWER BACK
LOCATION DETAILED: PERIUMBILICAL SKIN
LOCATION DETAILED: LEFT ANTERIOR DISTAL UPPER ARM
LOCATION DETAILED: RIGHT PROXIMAL POSTERIOR UPPER ARM
LOCATION DETAILED: RIGHT SUPERIOR MEDIAL UPPER BACK
LOCATION DETAILED: EPIGASTRIC SKIN
LOCATION DETAILED: LEFT RIB CAGE
LOCATION DETAILED: RIGHT ANTECUBITAL SKIN
LOCATION DETAILED: LEFT PROXIMAL PRETIBIAL REGION
LOCATION DETAILED: RIGHT PROXIMAL CALF

## 2018-09-27 ASSESSMENT — LOCATION SIMPLE DESCRIPTION DERM
LOCATION SIMPLE: RIGHT PRETIBIAL REGION
LOCATION SIMPLE: ABDOMEN
LOCATION SIMPLE: RIGHT CALF
LOCATION SIMPLE: LEFT UPPER ARM
LOCATION SIMPLE: RIGHT UPPER BACK
LOCATION SIMPLE: RIGHT LOWER BACK
LOCATION SIMPLE: RIGHT SHOULDER
LOCATION SIMPLE: LEFT CALF
LOCATION SIMPLE: LEFT UPPER BACK
LOCATION SIMPLE: RIGHT UPPER ARM
LOCATION SIMPLE: CHEST
LOCATION SIMPLE: RIGHT POSTERIOR UPPER ARM
LOCATION SIMPLE: LEFT PRETIBIAL REGION

## 2018-09-27 ASSESSMENT — LOCATION ZONE DERM
LOCATION ZONE: TRUNK
LOCATION ZONE: LEG
LOCATION ZONE: ARM

## 2018-09-27 NOTE — HPI: SECONDARY COMPLAINT
How Severe Is This Condition?: moderate
Additional History: Patient was tested at [facility] for a autoimmune work up that was negative, she has also been referred to endocrinology.

## 2018-09-27 NOTE — PROCEDURE: BIOPSY BY SHAVE METHOD
Additional Anesthesia Volume In Cc (Will Not Render If 0): 0
Notification Instructions: Patient will be notified of biopsy results. However, patient instructed to call the office if not contacted within 2 weeks.
Post-Care Instructions: I reviewed with the patient in detail post-care instructions. Patient is to keep the biopsy site dry overnight. Gentle cleansing daily.  Apply petroleum ointment daily until healed. Patient may apply hydrogen peroxide soaks to remove any crusting.
Lab Facility: 
Hemostasis: Electrocautery
Biopsy Method: Personna blade
Type Of Destruction Used: Curettage
Biopsy Type: H and E
Billing Type: Third-Party Bill
Detail Level: Detailed
Lab: [id number]
Wound Care: Petrolatum
Destruction After The Procedure: No
Anesthesia Type: 1% lidocaine with 1:100,000 epinephrine and a 1:10 solution of 8.4% sodium bicarbonate
Dressing: pressure dressing with telfa
Was A Bandage Applied: Yes
Cryotherapy Text: The wound bed was treated with cryotherapy after the biopsy was performed.
Anesthesia Volume In Cc: 2
Consent: Written consent was obtained and risks were reviewed including but not limited to scarring, infection, bleeding, scabbing, incomplete removal, nerve damage and allergy to anesthesia.
Silver Nitrate Text: The wound bed was treated with silver nitrate after the biopsy was performed.
Electrodesiccation Text: The wound bed was treated with electrodesiccation after the biopsy was performed.
Electrodesiccation And Curettage Text: The wound bed was treated with electrodesiccation and curettage after the biopsy was performed.
Depth Of Biopsy: dermis
Curettage Text: The wound bed was treated with curettage after the biopsy was performed.

## 2018-09-27 NOTE — PROCEDURE: COUNSELING
Detail Level: Zone
Detail Level: Generalized
Patient Specific Counseling (Will Not Stick From Patient To Patient): Reviewed labs from 9/15/2018 ordered by PCP, only abnormality were alkaline phosphatase and large blood cells.

## 2018-09-28 LAB
FERRITIN SERPL-MCNC: 76 NG/ML (ref 10–291)
FOLATE SERPL-MCNC: 17.8 NG/ML
HCV AB SER QL: NEGATIVE
T4 FREE SERPL-MCNC: 0.96 NG/DL (ref 0.53–1.43)
TSH SERPL DL<=0.005 MIU/L-ACNC: 0.79 UIU/ML (ref 0.38–5.33)
VIT B12 SERPL-MCNC: 322 PG/ML (ref 211–911)

## 2018-09-30 LAB
ALBUMIN SERPL-MCNC: 4.63 G/DL (ref 3.75–5.01)
ALPHA1 GLOB SERPL ELPH-MCNC: 0.27 G/DL (ref 0.19–0.46)
ALPHA2 GLOB SERPL ELPH-MCNC: 0.62 G/DL (ref 0.48–1.05)
B-GLOBULIN SERPL ELPH-MCNC: 0.72 G/DL (ref 0.48–1.1)
GAMMA GLOB SERPL ELPH-MCNC: 0.76 G/DL (ref 0.62–1.51)
INTERPRETATION SERPL IFE-IMP: NORMAL
MONOCLON BAND OBS SERPL: NORMAL
NUCLEAR IGG SER QL IA: NORMAL
PATHOLOGY STUDY: NORMAL
PROT SERPL-MCNC: 7 G/DL (ref 6–8.3)

## 2018-10-03 LAB — TEST NAME 95000: NORMAL

## 2018-10-24 ENCOUNTER — OFFICE VISIT (OUTPATIENT)
Dept: ENDOCRINOLOGY | Facility: MEDICAL CENTER | Age: 63
End: 2018-10-24
Payer: COMMERCIAL

## 2018-10-24 VITALS
WEIGHT: 106 LBS | HEART RATE: 69 BPM | DIASTOLIC BLOOD PRESSURE: 60 MMHG | HEIGHT: 60 IN | SYSTOLIC BLOOD PRESSURE: 100 MMHG | BODY MASS INDEX: 20.81 KG/M2 | OXYGEN SATURATION: 96 %

## 2018-10-24 DIAGNOSIS — R63.1 POLYDIPSIA: ICD-10-CM

## 2018-10-24 DIAGNOSIS — R35.89 POLYURIA: ICD-10-CM

## 2018-10-24 DIAGNOSIS — E89.40 ASYMPTOMATIC POSTSURGICAL MENOPAUSE: ICD-10-CM

## 2018-10-24 DIAGNOSIS — I95.0 IDIOPATHIC HYPOTENSION: Primary | ICD-10-CM

## 2018-10-24 PROCEDURE — 99245 OFF/OP CONSLTJ NEW/EST HI 55: CPT | Performed by: INTERNAL MEDICINE

## 2018-10-24 RX ORDER — CETIRIZINE HYDROCHLORIDE 10 MG/1
10 TABLET ORAL DAILY
COMMUNITY
End: 2020-04-19

## 2018-10-25 NOTE — PROGRESS NOTES
Chief Complaint   Patient presents with   • Polyuria            CHIEF COMPLAINT:     Endocrine evaluation requested by MITESH Gross for this 63 year old lady with multiple symptoms and unanswered questions.    PRESENT ILLNESS:  The issue that I am asked to address has to do with polydipsia and polyuria with her urine looking very light and no color most of the day.  This  implies possible diabetes insipidus.  Admittedly she drinks water constantly out of habit.  The history was somewhat compounded by the information given to her by her ENT doctor, Leona Dominguez, who indicates that she has a sphenoid sinusitis.  It is a very recalcitrant sinusitis and it was described to her to be in the area of the pituitary.  I did speak with Dr. Dominguez directly today and he does confirm that she has has a sphenoid sinusitis.  He has no suspicion that this has penetrated and invaded the sella turcica.  He indicates he has not seen that in his professional experience nor have I unless there has been actual direct invasion during transsphenoidal pituitary surgery.  That is not the case here.      In terms of the history of possible diabetes insipidus, she has recurrent angioedema.  She likes to keep her throat and mouth moist.  She drinks water constantly during the day out of habit and sometimes out of thirst.  She does have frequent urination of a very light color, almost water like urine.  However overnight she does not have polyuria.  Perhaps nocturia on one occasion.  When she gets up in the morning, her urine has developed color as expected on the first morning void until she starts drinking water again.     There has been no suspicion of pituitary insufficiency in the past however she does have episodes of hypotension, for example in a warm shower.  Her blood pressure naturally runs on the low side.  So postural dizziness is not unusual for her.  She has had weight loss about ten pounds over the past year.  She has  diminished appetite that she thinks might have to do with her medication.  She does have headaches on the right occipital area and also frontal area.  At times her vision is hazy although she does not describe peripheral field vision loss.     She is working full time as an  and is able to keep up with that job despite feeling fuzzy headed at times.     In the evenings she becomes markedly fatigued to the point of having to go to bed around 9:00 or so.  She considers that unusual for her.    She had a hysterectomy and oophorectomy at age 42 for endometriosis.  She does not take estrogen supplements.      For my part, I will address the possibility of pituitary insufficiency and/or diabetes insipidus.  I doubt either one exists but that is the question raised.  We will start out with the morning lab draw for pituitary related blood tests and in addition do serum and urine osmolalities and a vasopressin assay.  I will follow those test results by telephone.  She has never had a brain MRI so that might be a possibility depending on how our testing directs us.         ROS:  Chronic persistent elevation of alkaline phosphatase without obvious etiology.  Isoenzymes suggest bone etiology.  Nuclear medicine bone scan shows no abnormal uptake in the skeleton.  There is no specific area of bone pain that might be suspect.  Liver functions have been normal otherwise and a liver scan normal as well.    There is sphenoid sinus infection is still being treated and observed by Dr. Dominguez    Her angioedema has been quite dramatic and alarming.  No treatment solution found as yet.      Allergies:   Allergies   Allergen Reactions   • Cefdinir Rash   • Doxycycline Hyclate Rash       Current medicines including changes today:  Current Outpatient Prescriptions   Medication Sig Dispense Refill   • cetirizine (ZYRTEC) 10 MG Tab Take 10 mg by mouth every day.     • montelukast (SINGULAIR) 10 MG Tab Take 1 Tab by mouth every day.  (Patient not taking: Reported on 10/24/2018) 30 Tab 3   • vitamin D, Ergocalciferol, (DRISDOL) 11880 units Cap capsule Take 1 Cap by mouth every 7 days. (Patient not taking: Reported on 10/24/2018) 12 Cap 0   • metoclopramide (REGLAN) 10 MG Tab Take 1 Tab by mouth 4 times a day as needed (appetite/nausea/vomiting). (Patient not taking: Reported on 10/24/2018) 60 Tab 0   • diphenhydrAMINE (BENADRYL) 25 MG capsule Take 25 mg by mouth every 6 hours as needed.       No current facility-administered medications for this visit.         Past Medical History:   Diagnosis Date   • Endometriosis      Family history         No family member with a similar history.  No family history of pituitary disease    Social history           The patient is an  and has been able to conduct her work despite her medical problems           She does not smoke cigarettes or use recreational drugs      PHYSICAL EXAM:    /60 (BP Location: Left arm, Patient Position: Sitting, BP Cuff Size: Adult)   Pulse 69   Ht 1.524 m (5')   Wt 48.1 kg (106 lb)   SpO2 96%   BMI 20.70 kg/m²     Gen.   appears healthy     Skin   appropriate for sex and age    HEENT  unremarkable    Neck   thyroid gland is relatively small and difficult to palpate    Heart  regular    Extremities  no edema    Neuro  gait and station normal    Psych  appropriate    ASSESSMENT AND RECOMMENDATIONS    1. Polyuria  2.. Polydipsia               Rule out diabetes insipidus but doubt    - BASIC METABOLIC PANEL; Future  - OSMOLALITY URINE; Future  - OSMOLALITY SERUM; Future    3. Idiopathic hypotension            Rule out pituitary/adrenal insufficiency  - ACTH; Future  - CORTISOL; Future    4. Asymptomatic postsurgical menopause             Rule out hypopituitarism  - FSH; Future  - ESTRADIOL; Future      DISPOSITION: Follow-up initial endocrine testing by telephone.                             Further testing as necessary if suggested by the screening tests          John Woodson M.D.    Copies to: Yoly Root, A.P.N. [phone]                   Dr. Leona Rowe

## 2018-10-27 ENCOUNTER — HOSPITAL ENCOUNTER (OUTPATIENT)
Dept: LAB | Facility: MEDICAL CENTER | Age: 63
End: 2018-10-27
Attending: INTERNAL MEDICINE
Payer: COMMERCIAL

## 2018-10-27 DIAGNOSIS — R63.1 POLYDIPSIA: ICD-10-CM

## 2018-10-27 DIAGNOSIS — R35.89 POLYURIA: ICD-10-CM

## 2018-10-27 DIAGNOSIS — I95.0 IDIOPATHIC HYPOTENSION: ICD-10-CM

## 2018-10-27 DIAGNOSIS — E89.40 ASYMPTOMATIC POSTSURGICAL MENOPAUSE: ICD-10-CM

## 2018-10-27 LAB
ANION GAP SERPL CALC-SCNC: 8 MMOL/L (ref 0–11.9)
BUN SERPL-MCNC: 9 MG/DL (ref 8–22)
CALCIUM SERPL-MCNC: 10 MG/DL (ref 8.5–10.5)
CHLORIDE SERPL-SCNC: 102 MMOL/L (ref 96–112)
CO2 SERPL-SCNC: 29 MMOL/L (ref 20–33)
CORTIS SERPL-MCNC: 13.4 UG/DL (ref 0–23)
CREAT SERPL-MCNC: 0.66 MG/DL (ref 0.5–1.4)
ESTRADIOL SERPL-MCNC: 22 PG/ML
FSH SERPL-ACNC: 77.9 MIU/ML
GLUCOSE SERPL-MCNC: 100 MG/DL (ref 65–99)
OSMOLALITY SERPL: 286 MOSM/KG H2O (ref 278–298)
OSMOLALITY UR: 142 MOSM/KG H2O (ref 300–900)
POTASSIUM SERPL-SCNC: 3.9 MMOL/L (ref 3.6–5.5)
SODIUM SERPL-SCNC: 139 MMOL/L (ref 135–145)

## 2018-10-27 PROCEDURE — 36415 COLL VENOUS BLD VENIPUNCTURE: CPT

## 2018-10-27 PROCEDURE — 83930 ASSAY OF BLOOD OSMOLALITY: CPT

## 2018-10-27 PROCEDURE — 80048 BASIC METABOLIC PNL TOTAL CA: CPT

## 2018-10-27 PROCEDURE — 82533 TOTAL CORTISOL: CPT

## 2018-10-27 PROCEDURE — 82024 ASSAY OF ACTH: CPT

## 2018-10-27 PROCEDURE — 84305 ASSAY OF SOMATOMEDIN: CPT

## 2018-10-27 PROCEDURE — 83001 ASSAY OF GONADOTROPIN (FSH): CPT

## 2018-10-27 PROCEDURE — 82670 ASSAY OF TOTAL ESTRADIOL: CPT

## 2018-10-27 PROCEDURE — 83935 ASSAY OF URINE OSMOLALITY: CPT

## 2018-10-29 LAB
ACTH PLAS-MCNC: 19 PG/ML (ref 6–58)
IGF-I SERPL-MCNC: 116 NG/ML (ref 41–243)
IGF-I Z-SCORE SERPL: 0

## 2018-10-30 ENCOUNTER — PATIENT MESSAGE (OUTPATIENT)
Dept: ENDOCRINOLOGY | Facility: MEDICAL CENTER | Age: 63
End: 2018-10-30

## 2018-10-30 DIAGNOSIS — R51.9 CHRONIC NONINTRACTABLE HEADACHE, UNSPECIFIED HEADACHE TYPE: Primary | ICD-10-CM

## 2018-10-30 DIAGNOSIS — J01.30 SUBACUTE SPHENOIDAL SINUSITIS: ICD-10-CM

## 2018-10-30 DIAGNOSIS — G89.29 CHRONIC NONINTRACTABLE HEADACHE, UNSPECIFIED HEADACHE TYPE: Primary | ICD-10-CM

## 2018-10-30 DIAGNOSIS — E23.2 DIABETES INSIPIDUS (HCC): ICD-10-CM

## 2018-10-30 NOTE — TELEPHONE ENCOUNTER
From: Aminah Maldonado  To: John Woodson M.D.  Sent: 10/30/2018 1:18 PM PDT  Subject: Test Result Question    Please explain the OSMOLALITY URINE lab result on 10/27/18 that came back low.

## 2018-10-31 NOTE — PATIENT COMMUNICATION
Telephone conversation with patient lasting about 1/2-hour.    I reviewed current lab.  It is nondiagnostic.  Electrolytes remain normal with sodium 139 potassium 3.9 and creatinine normal at 0.6.  Her serum osmolality in the morning was 268 and urine osmolality low at 142.  This does not rule in or out diabetes insipidus.  Ordinarily if she really had DI I would expect her to dehydrate more overnight with a serum osmolality over 300.    Water deprivation test is long and arduous and sometimes inconclusive as well.  There is a different kind of test to rule out DI using co-peptin level and IV saline load.  I have never done this test so I am not going to do it but I indicated that some another way to approach this    The other pituitary screening tests indicate she does not have pituitary insufficiency.  Her morning cortisol was 13.4 and her ACTH 19.  FSH appropriately elevated at 77.  IGF 1 normal at 116.  Previously we have normal thyroid levels as well.    She is having headache now.  Dr. Dominguez indicated she has a very stubborn sphenoid sinusitis.  Her persistent alkaline phosphatase level is unexplained    I wanted a vasopressin level with her osmolalities and did not it.  I am going to repeat those tests.  Also she is going to keep an intake and output for 24 hours.    I will go ahead with an MRI of her brain and pituitary which will also include her sphenoid sinus.  Sort of grasping at straws.  I suggested a [facility] workup and she is reluctant to consider that.    John Woodson M.D.    Copies to MITESH Vaz

## 2018-11-07 ENCOUNTER — APPOINTMENT (RX ONLY)
Dept: URBAN - METROPOLITAN AREA CLINIC 22 | Facility: CLINIC | Age: 63
Setting detail: DERMATOLOGY
End: 2018-11-07

## 2018-11-07 DIAGNOSIS — L20.89 OTHER ATOPIC DERMATITIS: ICD-10-CM

## 2018-11-07 DIAGNOSIS — L81.0 POSTINFLAMMATORY HYPERPIGMENTATION: ICD-10-CM

## 2018-11-07 PROBLEM — L20.84 INTRINSIC (ALLERGIC) ECZEMA: Status: ACTIVE | Noted: 2018-11-07

## 2018-11-07 PROCEDURE — ? ADDITIONAL NOTES

## 2018-11-07 PROCEDURE — ? COUNSELING

## 2018-11-07 PROCEDURE — 99213 OFFICE O/P EST LOW 20 MIN: CPT

## 2018-11-07 ASSESSMENT — LOCATION SIMPLE DESCRIPTION DERM
LOCATION SIMPLE: LEFT UPPER BACK
LOCATION SIMPLE: ABDOMEN
LOCATION SIMPLE: RIGHT SHOULDER
LOCATION SIMPLE: LEFT PRETIBIAL REGION
LOCATION SIMPLE: RIGHT PRETIBIAL REGION
LOCATION SIMPLE: LEFT UPPER ARM
LOCATION SIMPLE: CHEST
LOCATION SIMPLE: RIGHT UPPER ARM
LOCATION SIMPLE: RIGHT LOWER BACK

## 2018-11-07 ASSESSMENT — LOCATION ZONE DERM
LOCATION ZONE: LEG
LOCATION ZONE: TRUNK
LOCATION ZONE: ARM

## 2018-11-07 ASSESSMENT — LOCATION DETAILED DESCRIPTION DERM
LOCATION DETAILED: RIGHT SUPERIOR MEDIAL LOWER BACK
LOCATION DETAILED: RIGHT ANTECUBITAL SKIN
LOCATION DETAILED: LEFT SUPERIOR MEDIAL UPPER BACK
LOCATION DETAILED: LEFT PROXIMAL PRETIBIAL REGION
LOCATION DETAILED: LEFT DISTAL PRETIBIAL REGION
LOCATION DETAILED: LEFT LATERAL SUPERIOR CHEST
LOCATION DETAILED: RIGHT PROXIMAL PRETIBIAL REGION
LOCATION DETAILED: LEFT ANTERIOR DISTAL UPPER ARM
LOCATION DETAILED: PERIUMBILICAL SKIN
LOCATION DETAILED: RIGHT DISTAL PRETIBIAL REGION
LOCATION DETAILED: RIGHT ANTERIOR SHOULDER

## 2018-11-07 ASSESSMENT — SEVERITY ASSESSMENT: SEVERITY: ALMOST CLEAR

## 2018-11-07 NOTE — PROCEDURE: ADDITIONAL NOTES
Additional Notes: Dr. Montes reviewed labs all within normal limits.\\nDiscussed light box treatment patient deferred treatment at time.\\nPatient is to continue derma-soothe body oil
Detail Level: Simple

## 2018-11-09 ENCOUNTER — TELEPHONE (OUTPATIENT)
Dept: ENDOCRINOLOGY | Facility: MEDICAL CENTER | Age: 63
End: 2018-11-09

## 2018-11-09 NOTE — TELEPHONE ENCOUNTER
Telephone conversation with patient lasting about 20 minutes.    She is miserable with itching skin and foggy thinking intermittently and frustrated by no diagnosis and no effective treatment.    I do not have an endocrine abnormality identified that I can treat.  Even the question of diabetes insipidus seems to be disappearing.  She is no longer getting up at night to urinate and she is seeing yellow urine much of the time.  She is not thirsty all the time.    She did see a skin specialist and gave her an oil to apply that seemed to help to some extent.  I suggested that she continue that.  The pruritus is very disturbing at night and I suggested an antihistamine might be soothing and help her with sleep.  Also I suggested pursuing allergy problems a bit further.  That is as far as I can go.    Her pituitary MRI is scheduled for next week.  I think that is going to be normal but it is important information.    John Woodson M.D.

## 2018-11-16 ENCOUNTER — HOSPITAL ENCOUNTER (OUTPATIENT)
Dept: RADIOLOGY | Facility: MEDICAL CENTER | Age: 63
End: 2018-11-16
Attending: INTERNAL MEDICINE
Payer: COMMERCIAL

## 2018-11-16 DIAGNOSIS — R51.9 CHRONIC NONINTRACTABLE HEADACHE, UNSPECIFIED HEADACHE TYPE: ICD-10-CM

## 2018-11-16 DIAGNOSIS — J01.30 SUBACUTE SPHENOIDAL SINUSITIS: ICD-10-CM

## 2018-11-16 DIAGNOSIS — G89.29 CHRONIC NONINTRACTABLE HEADACHE, UNSPECIFIED HEADACHE TYPE: ICD-10-CM

## 2018-11-16 DIAGNOSIS — E23.2 DIABETES INSIPIDUS (HCC): ICD-10-CM

## 2018-11-16 PROCEDURE — A9585 GADOBUTROL INJECTION: HCPCS | Performed by: INTERNAL MEDICINE

## 2018-11-16 PROCEDURE — 70553 MRI BRAIN STEM W/O & W/DYE: CPT

## 2018-11-16 PROCEDURE — 700117 HCHG RX CONTRAST REV CODE 255: Performed by: INTERNAL MEDICINE

## 2018-11-16 RX ORDER — GADOBUTROL 604.72 MG/ML
7.5 INJECTION INTRAVENOUS ONCE
Status: COMPLETED | OUTPATIENT
Start: 2018-11-16 | End: 2018-11-16

## 2018-11-16 RX ADMIN — GADOBUTROL 7.5 ML: 604.72 INJECTION INTRAVENOUS at 09:49

## 2019-01-08 ENCOUNTER — OFFICE VISIT (OUTPATIENT)
Dept: MEDICAL GROUP | Facility: MEDICAL CENTER | Age: 64
End: 2019-01-08
Payer: COMMERCIAL

## 2019-01-08 VITALS
BODY MASS INDEX: 20.81 KG/M2 | HEART RATE: 86 BPM | HEIGHT: 60 IN | RESPIRATION RATE: 16 BRPM | TEMPERATURE: 97.9 F | DIASTOLIC BLOOD PRESSURE: 76 MMHG | WEIGHT: 106 LBS | SYSTOLIC BLOOD PRESSURE: 122 MMHG | OXYGEN SATURATION: 98 %

## 2019-01-08 DIAGNOSIS — R10.11 RIGHT UPPER QUADRANT ABDOMINAL PAIN: ICD-10-CM

## 2019-01-08 DIAGNOSIS — T78.3XXD ANGIOEDEMA, SUBSEQUENT ENCOUNTER: ICD-10-CM

## 2019-01-08 DIAGNOSIS — L29.9 ITCHING: ICD-10-CM

## 2019-01-08 DIAGNOSIS — E87.8 OSMOLALITY DISTURBANCE: ICD-10-CM

## 2019-01-08 DIAGNOSIS — R53.83 LETHARGY: ICD-10-CM

## 2019-01-08 PROCEDURE — 99214 OFFICE O/P EST MOD 30 MIN: CPT | Performed by: NURSE PRACTITIONER

## 2019-01-08 RX ORDER — SUCRALFATE 1 G/1
1 TABLET ORAL
Qty: 120 TAB | Refills: 0 | Status: SHIPPED | OUTPATIENT
Start: 2019-01-08 | End: 2019-01-26 | Stop reason: CLARIF

## 2019-01-08 RX ORDER — OMEPRAZOLE 20 MG/1
20 CAPSULE, DELAYED RELEASE ORAL DAILY
Qty: 30 CAP | Refills: 1 | Status: SHIPPED | OUTPATIENT
Start: 2019-01-08 | End: 2019-01-26 | Stop reason: CLARIF

## 2019-01-09 NOTE — PROGRESS NOTES
Subjective:     Aminah Maldonado is a 63 y.o. female who presents with skin itching, RUQ abd pain and facial swelling.    HPI:   Seen in f/u for continued itching skin, swelling of face,  RUQ abd pain.  She has had these sx for 2 yrs now.  The facial swelling occurring intermittently.  Twice over last weekend.  She is very frustrated about of dx.  Has seen endocrinology, hematology, oncology and allergy. No dx fuond.  Continues to have clear u/o.  Diabetes inspidus was r/o.  All lab is wnl except urine osmo low.    She had abd us that was wnl.  abd us and HIDA was wnl.  MRI brain wnl.      Patient Active Problem List    Diagnosis Date Noted   • Allergic reaction 02/06/2018   • Pain 02/06/2018   • Angioedema 01/02/2018   • Idiopathic hypotension 09/21/2017   • DDD (degenerative disc disease), thoracic 09/21/2017   • Radicular pain of thoracic region 08/28/2017   • Right upper quadrant pain 08/04/2017   • Cough 08/04/2017   • Dizziness 08/04/2017   • SOB (shortness of breath) 08/04/2017   • Uncontrolled daytime somnolence 05/26/2017   • Elevated serum alkaline phosphatase level 05/26/2017   • Macrocytosis without anemia 05/26/2017   • Acute non-recurrent maxillary sinusitis 02/09/2017   • Fatigue 02/09/2017       Current medicines (including changes today)  Current Outpatient Prescriptions   Medication Sig Dispense Refill   • sucralfate (CARAFATE) 1 GM Tab Take 1 Tab by mouth 4 Times a Day,Before Meals and at Bedtime. 120 Tab 0   • omeprazole (PRILOSEC) 20 MG delayed-release capsule Take 1 Cap by mouth every day. 30 Cap 1   • cetirizine (ZYRTEC) 10 MG Tab Take 10 mg by mouth every day.     • montelukast (SINGULAIR) 10 MG Tab Take 1 Tab by mouth every day. (Patient not taking: Reported on 10/24/2018) 30 Tab 3   • vitamin D, Ergocalciferol, (DRISDOL) 10345 units Cap capsule Take 1 Cap by mouth every 7 days. (Patient not taking: Reported on 10/24/2018) 12 Cap 0   • metoclopramide (REGLAN) 10 MG Tab Take 1 Tab by  mouth 4 times a day as needed (appetite/nausea/vomiting). (Patient not taking: Reported on 10/24/2018) 60 Tab 0   • diphenhydrAMINE (BENADRYL) 25 MG capsule Take 25 mg by mouth every 6 hours as needed.       No current facility-administered medications for this visit.        Allergies   Allergen Reactions   • Cefdinir Rash   • Doxycycline Hyclate Rash       ROS  Constitutional: Negative. Negative for fever, chills, weight loss, diaphoresis.   HENT: Negative. Negative for hearing loss, ear pain, nosebleeds, congestion, sore throat, neck pain, tinnitus and ear discharge.   Respiratory: Negative. Negative for cough, hemoptysis, sputum production, shortness of breath, wheezing and stridor.   Cardiovascular: Negative. Negative for chest pain, palpitations, orthopnea, claudication, leg swelling and PND.   Gastrointestinal: Denies nausea, vomiting, diarrhea, constipation, heartburn, melena or hematochezia.  Genitourinary: Denies dysuria, hematuria, urinary incontinence, frequency or urgency.        Objective:     Blood pressure 122/76, pulse 86, temperature 36.6 °C (97.9 °F), resp. rate 16, height 1.524 m (5'), weight 48.1 kg (106 lb), SpO2 98 %, not currently breastfeeding. Body mass index is 20.7 kg/m².    Physical Exam:  Physical Exam   Vitals reviewed.  Constitutional: oriented to person, place, and time. appears well-developed and well-nourished. No distress.   Cardiovascular: Normal rate, regular rhythm, normal heart sounds and intact distal pulses.  Exam reveals no gallop and no friction rub.  No murmur heard.  No carotid bruits.   Pulmonary/Chest: Effort normal and breath sounds normal. No stridor. No respiratory distress. no wheezes or rales. exhibits no tenderness.   Musculoskeletal: Normal range of motion. exhibits no edema. sussy pedal pulses 2+.  Lymphadenopathy: no cervical or supraclavicular adenopathy.   Abd:  No CVAT,  Soft,  Bs noted in all quadrants.  No HSM.  + epigastric and RUQ abdominal  tenderness.  Neurological: alert and oriented to person, place, and time. exhibits normal muscle tone. Coordination normal.   Skin: Skin is warm and dry. no diaphoresis.   Psychiatric: normal mood and affect. behavior is normal.          Assessment and Plan:     The following treatment plan was discussed:    1. Right upper quadrant abdominal pain  OSMOLALITY URINE    OSMOLALITY SERUM    CT-ABDOMEN-PELVIS WITH & W/O    COMP METABOLIC PANEL    CBC WITH DIFFERENTIAL    VASOPRESSIN(ADH)    REFERRAL TO INTERNAL MEDICINE    MICROALBUMIN CREAT RATIO URINE    REFERRAL TO GASTROENTEROLOGY    sucralfate (CARAFATE) 1 GM Tab    omeprazole (PRILOSEC) 20 MG delayed-release capsule    has abd tenderness.  do prilosec and carafate.  do ct abd.  f/u with pt with results.  will refer back to GI for eval   2. Itching  OSMOLALITY URINE    OSMOLALITY SERUM    CT-ABDOMEN-PELVIS WITH & W/O    COMP METABOLIC PANEL    CBC WITH DIFFERENTIAL    VASOPRESSIN(ADH)    REFERRAL TO INTERNAL MEDICINE    MICROALBUMIN CREAT RATIO URINE    has seen derm w/o dx.  no etiology known to all her sx.     3. Angioedema, subsequent encounter  OSMOLALITY URINE    OSMOLALITY SERUM    CT-ABDOMEN-PELVIS WITH & W/O    COMP METABOLIC PANEL    CBC WITH DIFFERENTIAL    VASOPRESSIN(ADH)    REFERRAL TO INTERNAL MEDICINE    MICROALBUMIN CREAT RATIO URINE    continues to have facial swelling but no dx known.  will refer to DR bloch.  saw yelitzahenson w/o dx.  recheck lab.  if osmo is still abn will refer to neph   4. Lethargy  OSMOLALITY URINE    OSMOLALITY SERUM    CT-ABDOMEN-PELVIS WITH & W/O    COMP METABOLIC PANEL    CBC WITH DIFFERENTIAL    VASOPRESSIN(ADH)    REFERRAL TO INTERNAL MEDICINE    MICROALBUMIN CREAT RATIO URINE    chronic w/o know dx   5. Osmolality disturbance  OSMOLALITY URINE    OSMOLALITY SERUM    CT-ABDOMEN-PELVIS WITH & W/O    COMP METABOLIC PANEL    CBC WITH DIFFERENTIAL    VASOPRESSIN(ADH)    REFERRAL TO INTERNAL MEDICINE    MICROALBUMIN CREAT RATIO  URINE    her urine osmo was abn. will recheck.  if abn will refer to neph.  will also refer to dr bloch for multiple problems w/o know dx despite seeing multi specialist         Followup: Return in about 3 months (around 4/8/2019).

## 2019-01-10 ENCOUNTER — HOSPITAL ENCOUNTER (OUTPATIENT)
Dept: LAB | Facility: MEDICAL CENTER | Age: 64
End: 2019-01-10
Attending: NURSE PRACTITIONER
Payer: COMMERCIAL

## 2019-01-10 DIAGNOSIS — R10.11 RIGHT UPPER QUADRANT ABDOMINAL PAIN: ICD-10-CM

## 2019-01-10 DIAGNOSIS — T78.3XXD ANGIOEDEMA, SUBSEQUENT ENCOUNTER: ICD-10-CM

## 2019-01-10 DIAGNOSIS — L29.9 ITCHING: ICD-10-CM

## 2019-01-10 DIAGNOSIS — R53.83 LETHARGY: ICD-10-CM

## 2019-01-10 DIAGNOSIS — E87.8 OSMOLALITY DISTURBANCE: ICD-10-CM

## 2019-01-10 LAB
ALBUMIN SERPL BCP-MCNC: 4.8 G/DL (ref 3.2–4.9)
ALBUMIN/GLOB SERPL: 2 G/DL
ALP SERPL-CCNC: 135 U/L (ref 30–99)
ALT SERPL-CCNC: 13 U/L (ref 2–50)
ANION GAP SERPL CALC-SCNC: 10 MMOL/L (ref 0–11.9)
AST SERPL-CCNC: 19 U/L (ref 12–45)
BASOPHILS # BLD AUTO: 2.2 % (ref 0–1.8)
BASOPHILS # BLD: 0.09 K/UL (ref 0–0.12)
BILIRUB SERPL-MCNC: 1.4 MG/DL (ref 0.1–1.5)
BUN SERPL-MCNC: 6 MG/DL (ref 8–22)
CALCIUM SERPL-MCNC: 9.7 MG/DL (ref 8.5–10.5)
CHLORIDE SERPL-SCNC: 104 MMOL/L (ref 96–112)
CO2 SERPL-SCNC: 28 MMOL/L (ref 20–33)
CREAT SERPL-MCNC: 0.67 MG/DL (ref 0.5–1.4)
CREAT UR-MCNC: 25.6 MG/DL
EOSINOPHIL # BLD AUTO: 0.09 K/UL (ref 0–0.51)
EOSINOPHIL NFR BLD: 2.2 % (ref 0–6.9)
ERYTHROCYTE [DISTWIDTH] IN BLOOD BY AUTOMATED COUNT: 43.7 FL (ref 35.9–50)
GLOBULIN SER CALC-MCNC: 2.4 G/DL (ref 1.9–3.5)
GLUCOSE SERPL-MCNC: 98 MG/DL (ref 65–99)
HCT VFR BLD AUTO: 40.7 % (ref 37–47)
HGB BLD-MCNC: 13.5 G/DL (ref 12–16)
IMM GRANULOCYTES # BLD AUTO: 0 K/UL (ref 0–0.11)
IMM GRANULOCYTES NFR BLD AUTO: 0 % (ref 0–0.9)
LYMPHOCYTES # BLD AUTO: 1.37 K/UL (ref 1–4.8)
LYMPHOCYTES NFR BLD: 33.6 % (ref 22–41)
MCH RBC QN AUTO: 34.6 PG (ref 27–33)
MCHC RBC AUTO-ENTMCNC: 33.2 G/DL (ref 33.6–35)
MCV RBC AUTO: 104.4 FL (ref 81.4–97.8)
MICROALBUMIN UR-MCNC: <0.7 MG/DL
MICROALBUMIN/CREAT UR: NORMAL MG/G (ref 0–30)
MONOCYTES # BLD AUTO: 0.27 K/UL (ref 0–0.85)
MONOCYTES NFR BLD AUTO: 6.6 % (ref 0–13.4)
NEUTROPHILS # BLD AUTO: 2.26 K/UL (ref 2–7.15)
NEUTROPHILS NFR BLD: 55.4 % (ref 44–72)
NRBC # BLD AUTO: 0 K/UL
NRBC BLD-RTO: 0 /100 WBC
OSMOLALITY SERPL: 295 MOSM/KG H2O (ref 278–298)
OSMOLALITY UR: 163 MOSM/KG H2O (ref 300–900)
PLATELET # BLD AUTO: 340 K/UL (ref 164–446)
PMV BLD AUTO: 10.9 FL (ref 9–12.9)
POTASSIUM SERPL-SCNC: 4.1 MMOL/L (ref 3.6–5.5)
PROT SERPL-MCNC: 7.2 G/DL (ref 6–8.2)
RBC # BLD AUTO: 3.9 M/UL (ref 4.2–5.4)
SODIUM SERPL-SCNC: 142 MMOL/L (ref 135–145)
WBC # BLD AUTO: 4.1 K/UL (ref 4.8–10.8)

## 2019-01-10 PROCEDURE — 85025 COMPLETE CBC W/AUTO DIFF WBC: CPT

## 2019-01-10 PROCEDURE — 36415 COLL VENOUS BLD VENIPUNCTURE: CPT

## 2019-01-10 PROCEDURE — 82043 UR ALBUMIN QUANTITATIVE: CPT

## 2019-01-10 PROCEDURE — 80053 COMPREHEN METABOLIC PANEL: CPT

## 2019-01-10 PROCEDURE — 83930 ASSAY OF BLOOD OSMOLALITY: CPT

## 2019-01-10 PROCEDURE — 83935 ASSAY OF URINE OSMOLALITY: CPT

## 2019-01-10 PROCEDURE — 84588 ASSAY OF VASOPRESSIN: CPT

## 2019-01-10 PROCEDURE — 82570 ASSAY OF URINE CREATININE: CPT

## 2019-01-13 ENCOUNTER — TELEPHONE (OUTPATIENT)
Dept: MEDICAL GROUP | Facility: MEDICAL CENTER | Age: 64
End: 2019-01-13

## 2019-01-13 NOTE — LETTER
January 17, 2019        Aminah Maldonado  [address]  [place] [address]        Dear Aminah:    Yoly Root's office has tried contacting you. At your earliest convenience please contact our office at [phone].      If you have any questions or concerns, please don't hesitate to call.        Sincerely,        Yoly Root, KATHLEENPANISHA.    Electronically Signed

## 2019-01-14 NOTE — TELEPHONE ENCOUNTER
Please let pt know that the lab is wnl except the urine osmo is still abn.  She needs to f/u with dr bloch that she is being referred to.  Also i can refer her to neph for the abn lab if she would like

## 2019-01-16 DIAGNOSIS — R89.9 ABNORMAL LABORATORY TEST: ICD-10-CM

## 2019-01-18 LAB — MISCELLANEOUS LAB RESULT MISCLAB: NORMAL

## 2019-01-25 ENCOUNTER — OFFICE VISIT (OUTPATIENT)
Dept: NEPHROLOGY | Facility: MEDICAL CENTER | Age: 64
End: 2019-01-25
Payer: COMMERCIAL

## 2019-01-25 VITALS
SYSTOLIC BLOOD PRESSURE: 104 MMHG | OXYGEN SATURATION: 99 % | HEART RATE: 60 BPM | RESPIRATION RATE: 14 BRPM | HEIGHT: 60 IN | WEIGHT: 107 LBS | BODY MASS INDEX: 21.01 KG/M2 | DIASTOLIC BLOOD PRESSURE: 66 MMHG | TEMPERATURE: 98.8 F

## 2019-01-25 DIAGNOSIS — R35.89 POLYURIA: ICD-10-CM

## 2019-01-25 PROCEDURE — 99202 OFFICE O/P NEW SF 15 MIN: CPT | Performed by: INTERNAL MEDICINE

## 2019-01-25 ASSESSMENT — ENCOUNTER SYMPTOMS: WEAKNESS: 1

## 2019-01-26 ENCOUNTER — HOSPITAL ENCOUNTER (EMERGENCY)
Facility: MEDICAL CENTER | Age: 64
End: 2019-01-26
Attending: EMERGENCY MEDICINE
Payer: COMMERCIAL

## 2019-01-26 ENCOUNTER — HOSPITAL ENCOUNTER (OUTPATIENT)
Dept: RADIOLOGY | Facility: MEDICAL CENTER | Age: 64
End: 2019-01-26
Attending: NURSE PRACTITIONER
Payer: COMMERCIAL

## 2019-01-26 ENCOUNTER — HOSPITAL ENCOUNTER (OUTPATIENT)
Dept: LAB | Facility: MEDICAL CENTER | Age: 64
End: 2019-01-26
Attending: INTERNAL MEDICINE
Payer: COMMERCIAL

## 2019-01-26 VITALS
WEIGHT: 107 LBS | SYSTOLIC BLOOD PRESSURE: 122 MMHG | OXYGEN SATURATION: 95 % | HEIGHT: 60 IN | HEART RATE: 82 BPM | BODY MASS INDEX: 21.01 KG/M2 | DIASTOLIC BLOOD PRESSURE: 78 MMHG | RESPIRATION RATE: 17 BRPM | TEMPERATURE: 97.7 F

## 2019-01-26 DIAGNOSIS — T78.40XA ALLERGIC REACTION, INITIAL ENCOUNTER: ICD-10-CM

## 2019-01-26 DIAGNOSIS — E87.8 OSMOLALITY DISTURBANCE: ICD-10-CM

## 2019-01-26 DIAGNOSIS — R53.83 LETHARGY: ICD-10-CM

## 2019-01-26 DIAGNOSIS — T78.3XXD ANGIOEDEMA, SUBSEQUENT ENCOUNTER: ICD-10-CM

## 2019-01-26 DIAGNOSIS — L29.9 ITCHING: ICD-10-CM

## 2019-01-26 DIAGNOSIS — R10.11 RIGHT UPPER QUADRANT ABDOMINAL PAIN: ICD-10-CM

## 2019-01-26 PROCEDURE — 74177 CT ABD & PELVIS W/CONTRAST: CPT

## 2019-01-26 PROCEDURE — 99284 EMERGENCY DEPT VISIT MOD MDM: CPT

## 2019-01-26 PROCEDURE — 96374 THER/PROPH/DIAG INJ IV PUSH: CPT

## 2019-01-26 PROCEDURE — 96375 TX/PRO/DX INJ NEW DRUG ADDON: CPT

## 2019-01-26 PROCEDURE — 700117 HCHG RX CONTRAST REV CODE 255: Performed by: NURSE PRACTITIONER

## 2019-01-26 PROCEDURE — 700111 HCHG RX REV CODE 636 W/ 250 OVERRIDE (IP)

## 2019-01-26 RX ORDER — DEXAMETHASONE SODIUM PHOSPHATE 4 MG/ML
INJECTION, SOLUTION INTRA-ARTICULAR; INTRALESIONAL; INTRAMUSCULAR; INTRAVENOUS; SOFT TISSUE
Status: COMPLETED
Start: 2019-01-26 | End: 2019-01-26

## 2019-01-26 RX ORDER — METHYLPREDNISOLONE 4 MG/1
TABLET ORAL
Qty: 1 KIT | Refills: 0 | Status: SHIPPED | OUTPATIENT
Start: 2019-01-26 | End: 2020-04-19

## 2019-01-26 RX ORDER — DIPHENHYDRAMINE HYDROCHLORIDE 50 MG/ML
INJECTION INTRAMUSCULAR; INTRAVENOUS
Status: COMPLETED
Start: 2019-01-26 | End: 2019-01-26

## 2019-01-26 RX ORDER — DEXAMETHASONE SODIUM PHOSPHATE 10 MG/ML
10 INJECTION, SOLUTION INTRAMUSCULAR; INTRAVENOUS ONCE
Status: COMPLETED | OUTPATIENT
Start: 2019-01-26 | End: 2019-01-26

## 2019-01-26 RX ORDER — DIPHENHYDRAMINE HYDROCHLORIDE 50 MG/ML
25 INJECTION INTRAMUSCULAR; INTRAVENOUS ONCE
Status: COMPLETED | OUTPATIENT
Start: 2019-01-26 | End: 2019-01-26

## 2019-01-26 RX ADMIN — IOHEXOL 100 ML: 350 INJECTION, SOLUTION INTRAVENOUS at 13:49

## 2019-01-26 RX ADMIN — DIPHENHYDRAMINE HYDROCHLORIDE 25 MG: 50 INJECTION INTRAMUSCULAR; INTRAVENOUS at 14:40

## 2019-01-26 RX ADMIN — DEXAMETHASONE SODIUM PHOSPHATE 10 MG: 4 INJECTION, SOLUTION INTRA-ARTICULAR; INTRALESIONAL; INTRAMUSCULAR; INTRAVENOUS; SOFT TISSUE at 14:40

## 2019-01-26 RX ADMIN — DIPHENHYDRAMINE HYDROCHLORIDE 25 MG: 50 INJECTION, SOLUTION INTRAMUSCULAR; INTRAVENOUS at 14:40

## 2019-01-26 RX ADMIN — IOHEXOL 50 ML: 240 INJECTION, SOLUTION INTRATHECAL; INTRAVASCULAR; INTRAVENOUS; ORAL at 12:27

## 2019-01-26 NOTE — ED PROVIDER NOTES
ED Provider Note    CHIEF COMPLAINT  Allergic reaction    HPI  Aminah Maldonado is a 63 y.o. female who presents evaluation allergic reaction.  The patient was undergoing CT scanning today with IV contrast when the patient developed allergic reaction symptomatology.  The patient developed swelling in her throat tongue and lips along with some shortness of breath and redness to her eyes.  The patient states she has been having problems with allergic reactions and has been undergoing a workup with inability to determine one exact precipitating cause or I she keeps having recurrent reactions.  Patient was having a CT scan for abdominal pain.  Patient denies recent: Fever, chills, cough, sputum, shortness of breath, hemoptysis, hematemesis, melena hematochezia, syncope.  No other acute symptomatology or complaints.    REVIEW OF SYSTEMS  See HPI for further details.  She denies major health problems such as: Hypertension, diabetes, thyroid dysfunction, seizures, cardia pulmonary disorders, gastrointestinal disorders, genitourinary disorders.  All other systems negative.    PAST MEDICAL HISTORY  Past Medical History:   Diagnosis Date   • Endometriosis        FAMILY HISTORY  Family History   Problem Relation Age of Onset   • Heart Disease Mother    • Lung Disease Father    • Cancer Father 81        lung   • Diabetes Father    • Lung Disease Sister    • Cancer Sister 55        lung   • Diabetes Brother    • Kidney Disease Brother 49        on dialysis       SOCIAL HISTORY  Non-smoker; occasional alcohol use;    SURGICAL HISTORY  Past Surgical History:   Procedure Laterality Date   • ABDOMINAL HYSTERECTOMY TOTAL      and BSO for endometriosis   • APPENDECTOMY     • TONSILLECTOMY AND ADENOIDECTOMY         CURRENT MEDICATIONS  See nurse's notes    ALLERGIES  Allergies   Allergen Reactions   • Cefdinir Rash   • Doxycycline Hyclate Rash       PHYSICAL EXAM  VITAL SIGNS: see nurse's notes  Constitutional: 63-year-old female,  thin, understandably anxious, awake, oriented x3  HENT: ,Atraumatic, Bilateral external ears normal, tympanic membranes clear, Oropharynx mildly dry, No oral exudates, Nose normal.   Eyes: PERRL, EOMI, Conjunctiva mildly injected.   Neck: Normal range of motion, No tenderness, Supple, No stridor.   Lymphatic: No lymphadenopathy noted.   Cardiovascular: Normal heart rate, Normal rhythm, No murmurs, No rubs, No gallops.   Thorax & Lungs: Normal Equal breath sounds, No respiratory distress, No wheezing, no stridor, no rales. No chest tenderness.   Abdomen: Soft, nontender, nondistended, no organomegaly, positive bowel sounds normal in quality. No guarding or rebound.  Skin: Good skin turgor, pink, warm, dry. No rashes, petechiae, purpura. Normal capillary refill.   Back: No tenderness, No CVA tenderness.   Extremities: Intact distal pulses, No edema, No tenderness, No cyanosis, No clubbing. Vascular: Pulses are 2+, symmetric in the upper and lower extremities.  Musculoskeletal: Diffuse arthritic changes with diffuse muscular atrophy. No tenderness to palpation or major deformities noted.   Neurologic: Alert & oriented x 3, Normal motor function, Normal sensory function, No gross focal deficits noted.   Psychiatric: Affect normal, Judgment normal, Mood normal.       COURSE & MEDICAL DECISION MAKING  Pertinent Labs & Imaging studies reviewed. (See chart for details)  1.  Saline lock  2.  Diphenhydramine 25 mg IV  3.  Decadron 10 mg IV    Discussion: At this point, the patient presents for evaluation of acute allergic reaction.  Treatment is initiated as noted above.  Patient was observed closely.  The patient had improvement in her symptoms with resolution of her subjective swelling in her throat and mouth.  Repeat examination revealed no intraoral swelling.  She was hemodynamically stable with no signs of hypotension.  No evidence of airway compromise.  Based on these findings, the patient is stable for discharge.  I  have discussed the findings and treatment plan with the patient.  She indicates that she is comfortable with this explanation and disposition.    FINAL IMPRESSION  1. Allergic reaction, initial encounter       PLAN  1.  Appropriate discharge instructions given  2.  Medrol Dosepak  3.  Diphenhydramine 25-50 mg every 6 hours as needed for allergic reaction  4.  Follow-up with primary care    Electronically signed by: Guy G Gansert, 1/26/2019 2:34 PM

## 2019-01-26 NOTE — ED TRIAGE NOTES
Chief Complaint   Patient presents with   • Allergic Reaction     CT with contrast at 1330. Pt reports SOB and itching 20 min post CT

## 2019-01-26 NOTE — PROGRESS NOTES
Subjective:      Aminah Maldonado is a 63 y.o. female who presents with dilute urine, clear urine  New Patient            HPI     63 year old with a history of recurrent angioedema, noted to have frequent urination with clear dilute urine. UOsm 163. She does note that she drinks quite a bit, about 72+ oz of fluid daily. Gets thirsty if she does not drink this amount. If she does not drink this amount, she feels she gets dehydrated.    Has a rash and gets pruritis. Not taking omeprazole.    Review of Systems   Constitutional: Positive for malaise/fatigue.   Neurological: Positive for weakness.   All other systems reviewed and are negative.         Objective:     /66 (BP Location: Right arm, Patient Position: Sitting, BP Cuff Size: Adult)   Pulse 60   Temp 37.1 °C (98.8 °F) (Temporal)   Resp 14   Ht 1.524 m (5')   Wt 48.5 kg (107 lb)   SpO2 99%   BMI 20.90 kg/m²      Physical Exam   Constitutional: She is oriented to person, place, and time. She appears well-developed and well-nourished.   HENT:   Head: Normocephalic and atraumatic.   Cardiovascular: Normal rate and regular rhythm.    Pulmonary/Chest: Effort normal and breath sounds normal.   Musculoskeletal: She exhibits no edema or deformity.   Neurological: She is alert and oriented to person, place, and time.   Skin: Skin is warm and dry.   Psychiatric: She has a normal mood and affect. Her behavior is normal.               Assessment/Plan:     1. Polyuria      -Patient with significant intake and increased urine output of dilute urine. She has appropriately suppressed ADH, and no hypernatremia.  -From a renal standpoint, start with 24 hour urine to get some sense of total urine output, then, if appropriate consider dehydration

## 2019-01-26 NOTE — ED NOTES
Pt resting comfortably at this time. Patient is not experiencing any distress or discomfort, VSS, will continue to monitor

## 2019-01-27 NOTE — ED NOTES
Prescriptions provided and discussed with patient, pt verbalized understanding. Discharge instructions provided.  Pt verbalized the understanding of discharge instructions to follow up with PCP and to return to ER if condition worsens.  Pt ambulated out of ER without difficulty.

## 2019-01-27 NOTE — DISCHARGE INSTRUCTIONS
1.  Use Benadryl(diphenhydramine) 25-50 mg every 6 hours as needed for allergic reaction;  2.  Follow-up with primary care  3.  Recheck immediately if any change worsening symptoms including any difficulty breathing swallowing or weakness or lightheadedness or feeling as if you may want to pass out;

## 2019-01-28 ENCOUNTER — TELEPHONE (OUTPATIENT)
Dept: MEDICAL GROUP | Facility: MEDICAL CENTER | Age: 64
End: 2019-01-28

## 2019-02-04 ENCOUNTER — HOSPITAL ENCOUNTER (OUTPATIENT)
Dept: LAB | Facility: MEDICAL CENTER | Age: 64
End: 2019-02-04
Attending: INTERNAL MEDICINE
Payer: COMMERCIAL

## 2019-02-04 DIAGNOSIS — R35.89 POLYURIA: ICD-10-CM

## 2019-02-04 LAB
COLLECT DURATION TIME UR: 24 HR
CREAT 24H UR-MSRATE: 864 MG/24 HR (ref 800–1800)
CREAT CL/1.73 SQ M ?TM UR+SERPL-ARVRAT: 106 ML/MIN (ref 88–128)
CREAT SERPL-MCNC: 0.69 MG/DL (ref 0.5–1.4)
CREAT UR-MCNC: 20.2 MG/DL
CREAT UR-MCNC: 20.3 MG/DL
PROT 24H UR-MCNC: NORMAL MG/24 HR (ref 30–150)
PROT 24H UR-MRATE: <4 MG/DL (ref 0–15)
SPECIMEN VOL UR: 4275 ML
URINE CREATININE EXCRETED 1125: 868 MG/24 HR

## 2019-02-04 PROCEDURE — 82575 CREATININE CLEARANCE TEST: CPT

## 2019-02-04 PROCEDURE — 82570 ASSAY OF URINE CREATININE: CPT

## 2019-02-04 PROCEDURE — 84156 ASSAY OF PROTEIN URINE: CPT

## 2019-02-04 PROCEDURE — 36415 COLL VENOUS BLD VENIPUNCTURE: CPT

## 2019-02-04 PROCEDURE — 81050 URINALYSIS VOLUME MEASURE: CPT | Mod: 91

## 2019-02-06 ENCOUNTER — HOSPITAL ENCOUNTER (OUTPATIENT)
Dept: LAB | Facility: MEDICAL CENTER | Age: 64
End: 2019-02-06
Attending: NURSE PRACTITIONER
Payer: COMMERCIAL

## 2019-02-06 LAB
BASOPHILS # BLD AUTO: 1.9 % (ref 0–1.8)
BASOPHILS # BLD: 0.08 K/UL (ref 0–0.12)
EOSINOPHIL # BLD AUTO: 0.07 K/UL (ref 0–0.51)
EOSINOPHIL NFR BLD: 1.7 % (ref 0–6.9)
ERYTHROCYTE [DISTWIDTH] IN BLOOD BY AUTOMATED COUNT: 45.2 FL (ref 35.9–50)
HCT VFR BLD AUTO: 43.5 % (ref 37–47)
HGB BLD-MCNC: 14.2 G/DL (ref 12–16)
IMM GRANULOCYTES # BLD AUTO: 0.01 K/UL (ref 0–0.11)
IMM GRANULOCYTES NFR BLD AUTO: 0.2 % (ref 0–0.9)
LYMPHOCYTES # BLD AUTO: 1.21 K/UL (ref 1–4.8)
LYMPHOCYTES NFR BLD: 28.9 % (ref 22–41)
MCH RBC QN AUTO: 34.8 PG (ref 27–33)
MCHC RBC AUTO-ENTMCNC: 32.6 G/DL (ref 33.6–35)
MCV RBC AUTO: 106.6 FL (ref 81.4–97.8)
MONOCYTES # BLD AUTO: 0.34 K/UL (ref 0–0.85)
MONOCYTES NFR BLD AUTO: 8.1 % (ref 0–13.4)
NEUTROPHILS # BLD AUTO: 2.47 K/UL (ref 2–7.15)
NEUTROPHILS NFR BLD: 59.2 % (ref 44–72)
NRBC # BLD AUTO: 0 K/UL
NRBC BLD-RTO: 0 /100 WBC
PLATELET # BLD AUTO: 371 K/UL (ref 164–446)
PMV BLD AUTO: 10.9 FL (ref 9–12.9)
RBC # BLD AUTO: 4.08 M/UL (ref 4.2–5.4)
T4 FREE SERPL-MCNC: 0.84 NG/DL (ref 0.53–1.43)
THYROPEROXIDASE AB SERPL-ACNC: 1 IU/ML (ref 0–9)
TSH SERPL DL<=0.005 MIU/L-ACNC: 5.03 UIU/ML (ref 0.38–5.33)
VIT B12 SERPL-MCNC: 264 PG/ML (ref 211–911)
WBC # BLD AUTO: 4.2 K/UL (ref 4.8–10.8)

## 2019-02-06 PROCEDURE — 36415 COLL VENOUS BLD VENIPUNCTURE: CPT

## 2019-02-06 PROCEDURE — 84439 ASSAY OF FREE THYROXINE: CPT

## 2019-02-06 PROCEDURE — 86800 THYROGLOBULIN ANTIBODY: CPT

## 2019-02-06 PROCEDURE — 85025 COMPLETE CBC W/AUTO DIFF WBC: CPT

## 2019-02-06 PROCEDURE — 84443 ASSAY THYROID STIM HORMONE: CPT

## 2019-02-06 PROCEDURE — 82607 VITAMIN B-12: CPT

## 2019-02-06 PROCEDURE — 86376 MICROSOMAL ANTIBODY EACH: CPT

## 2019-02-08 LAB — THYROGLOB AB SERPL-ACNC: <0.9 IU/ML (ref 0–4)

## 2019-02-22 ENCOUNTER — OFFICE VISIT (OUTPATIENT)
Dept: NEPHROLOGY | Facility: MEDICAL CENTER | Age: 64
End: 2019-02-22
Payer: COMMERCIAL

## 2019-02-22 VITALS
HEART RATE: 68 BPM | BODY MASS INDEX: 20.81 KG/M2 | TEMPERATURE: 98 F | DIASTOLIC BLOOD PRESSURE: 60 MMHG | WEIGHT: 106 LBS | RESPIRATION RATE: 16 BRPM | HEIGHT: 60 IN | SYSTOLIC BLOOD PRESSURE: 104 MMHG | OXYGEN SATURATION: 96 %

## 2019-02-22 DIAGNOSIS — R35.89 POLYURIA: ICD-10-CM

## 2019-02-22 PROCEDURE — 99213 OFFICE O/P EST LOW 20 MIN: CPT | Performed by: INTERNAL MEDICINE

## 2019-03-01 ASSESSMENT — ENCOUNTER SYMPTOMS
CHILLS: 0
FEVER: 0
ROS SKIN COMMENTS: DRY SKIN

## 2019-03-01 NOTE — PROGRESS NOTES
"Subjective:      Aminah Maldonado is a 63 y.o. female who presents with Follow-Up            HPI  63 year old with a history of recurrent angioedema, noted to have frequent urination with clear dilute urine. UOsm 163. She does note that she drinks quite a bit, about 72+ oz of fluid daily. Gets thirsty if she does not drink this amount. If she does not drink this amount, she feels she gets dehydrated.     Has a rash and gets pruritis. Not taking omeprazole.       First seen on 1/25/19; since then has had labs indicating normal serum Cr and urine volume of 4.2 L. She does indicate that she drinks quite a bit of fluid and feels that she gets \"dry\" when she does not, specifically with dry mouth and skin as well as lips.      Review of Systems   Constitutional: Positive for malaise/fatigue. Negative for chills and fever.   Skin:        Dry skin   All other systems reviewed and are negative.         Objective:     /60 (BP Location: Right arm, Patient Position: Sitting, BP Cuff Size: Adult)   Pulse 68   Temp 36.7 °C (98 °F) (Temporal)   Resp 16   Ht 1.524 m (5')   Wt 48.1 kg (106 lb)   SpO2 96%   BMI 20.70 kg/m²      Physical Exam   Constitutional: She is oriented to person, place, and time. She appears well-developed and well-nourished.   HENT:   Head: Normocephalic and atraumatic.   Cardiovascular: Normal rate and regular rhythm.    Pulmonary/Chest: Effort normal and breath sounds normal.   Neurological: She is alert and oriented to person, place, and time.   Skin: Skin is warm and dry.               Assessment/Plan:     1. Polyuria  Patient with polyuria, though no hypernatremia in history. The urine volume can potentially be explained by input. Discussed with the patient that dehydration test can potentially be used, but given the overall picture the likelihood is that the polyuria is due to excessive intake.    Would therefore not pursue further workup at this time unless electrolyte abnormalities " (specifically hypernatremia) surface. Follow up PRN.

## 2019-04-22 ENCOUNTER — APPOINTMENT (OUTPATIENT)
Dept: VASCULAR LAB | Facility: MEDICAL CENTER | Age: 64
End: 2019-04-22
Payer: COMMERCIAL

## 2019-04-27 ENCOUNTER — HOSPITAL ENCOUNTER (OUTPATIENT)
Dept: LAB | Facility: MEDICAL CENTER | Age: 64
End: 2019-04-27
Attending: NURSE PRACTITIONER
Payer: COMMERCIAL

## 2019-04-27 LAB
T4 FREE SERPL-MCNC: 1.12 NG/DL (ref 0.53–1.43)
TSH SERPL DL<=0.005 MIU/L-ACNC: 0.3 UIU/ML (ref 0.38–5.33)

## 2019-04-27 PROCEDURE — 36415 COLL VENOUS BLD VENIPUNCTURE: CPT

## 2019-04-27 PROCEDURE — 84443 ASSAY THYROID STIM HORMONE: CPT

## 2019-04-27 PROCEDURE — 84439 ASSAY OF FREE THYROXINE: CPT

## 2019-05-11 ENCOUNTER — HOSPITAL ENCOUNTER (OUTPATIENT)
Dept: LAB | Facility: MEDICAL CENTER | Age: 64
End: 2019-05-11
Attending: NURSE PRACTITIONER
Payer: COMMERCIAL

## 2019-05-11 LAB
ALBUMIN SERPL BCP-MCNC: 4.3 G/DL (ref 3.2–4.9)
ALBUMIN/GLOB SERPL: 1.8 G/DL
ALP SERPL-CCNC: 190 U/L (ref 30–99)
ALT SERPL-CCNC: 9 U/L (ref 2–50)
ANION GAP SERPL CALC-SCNC: 7 MMOL/L (ref 0–11.9)
AST SERPL-CCNC: 16 U/L (ref 12–45)
BASOPHILS # BLD AUTO: 1.6 % (ref 0–1.8)
BASOPHILS # BLD: 0.05 K/UL (ref 0–0.12)
BILIRUB SERPL-MCNC: 0.8 MG/DL (ref 0.1–1.5)
BUN SERPL-MCNC: 10 MG/DL (ref 8–22)
CALCIUM SERPL-MCNC: 9.2 MG/DL (ref 8.5–10.5)
CHLORIDE SERPL-SCNC: 106 MMOL/L (ref 96–112)
CO2 SERPL-SCNC: 30 MMOL/L (ref 20–33)
CREAT SERPL-MCNC: 0.53 MG/DL (ref 0.5–1.4)
CRP SERPL HS-MCNC: 0.09 MG/DL (ref 0–0.75)
EOSINOPHIL # BLD AUTO: 0.12 K/UL (ref 0–0.51)
EOSINOPHIL NFR BLD: 3.8 % (ref 0–6.9)
ERYTHROCYTE [DISTWIDTH] IN BLOOD BY AUTOMATED COUNT: 44.2 FL (ref 35.9–50)
ERYTHROCYTE [SEDIMENTATION RATE] IN BLOOD BY WESTERGREN METHOD: 2 MM/HOUR (ref 0–30)
GLOBULIN SER CALC-MCNC: 2.4 G/DL (ref 1.9–3.5)
GLUCOSE SERPL-MCNC: 100 MG/DL (ref 65–99)
HCT VFR BLD AUTO: 41.7 % (ref 37–47)
HGB BLD-MCNC: 14.1 G/DL (ref 12–16)
HGB RETIC QN AUTO: 38 PG/CELL (ref 29–35)
IMM GRANULOCYTES # BLD AUTO: 0 K/UL (ref 0–0.11)
IMM GRANULOCYTES NFR BLD AUTO: 0 % (ref 0–0.9)
IMM RETICS NFR: 8.3 % (ref 9.3–17.4)
LDH SERPL L TO P-CCNC: 208 U/L (ref 107–266)
LYMPHOCYTES # BLD AUTO: 0.9 K/UL (ref 1–4.8)
LYMPHOCYTES NFR BLD: 28.8 % (ref 22–41)
MCH RBC QN AUTO: 35.6 PG (ref 27–33)
MCHC RBC AUTO-ENTMCNC: 33.8 G/DL (ref 33.6–35)
MCV RBC AUTO: 105.3 FL (ref 81.4–97.8)
MONOCYTES # BLD AUTO: 0.29 K/UL (ref 0–0.85)
MONOCYTES NFR BLD AUTO: 9.3 % (ref 0–13.4)
NEUTROPHILS # BLD AUTO: 1.77 K/UL (ref 2–7.15)
NEUTROPHILS NFR BLD: 56.5 % (ref 44–72)
NRBC # BLD AUTO: 0 K/UL
NRBC BLD-RTO: 0 /100 WBC
PLATELET # BLD AUTO: 306 K/UL (ref 164–446)
PMV BLD AUTO: 10.9 FL (ref 9–12.9)
POTASSIUM SERPL-SCNC: 4.4 MMOL/L (ref 3.6–5.5)
PROT SERPL-MCNC: 6.7 G/DL (ref 6–8.2)
RBC # BLD AUTO: 3.96 M/UL (ref 4.2–5.4)
RETICS # AUTO: 0.03 M/UL (ref 0.04–0.06)
RETICS/RBC NFR: 0.9 % (ref 0.8–2.1)
SODIUM SERPL-SCNC: 143 MMOL/L (ref 135–145)
WBC # BLD AUTO: 3.1 K/UL (ref 4.8–10.8)

## 2019-05-11 PROCEDURE — 36415 COLL VENOUS BLD VENIPUNCTURE: CPT

## 2019-05-11 PROCEDURE — 80053 COMPREHEN METABOLIC PANEL: CPT

## 2019-05-11 PROCEDURE — 85652 RBC SED RATE AUTOMATED: CPT

## 2019-05-11 PROCEDURE — 85025 COMPLETE CBC W/AUTO DIFF WBC: CPT

## 2019-05-11 PROCEDURE — 85046 RETICYTE/HGB CONCENTRATE: CPT

## 2019-05-11 PROCEDURE — 83883 ASSAY NEPHELOMETRY NOT SPEC: CPT

## 2019-05-11 PROCEDURE — 84156 ASSAY OF PROTEIN URINE: CPT

## 2019-05-11 PROCEDURE — 86140 C-REACTIVE PROTEIN: CPT

## 2019-05-11 PROCEDURE — 83615 LACTATE (LD) (LDH) ENZYME: CPT

## 2019-05-11 PROCEDURE — 84165 PROTEIN E-PHORESIS SERUM: CPT

## 2019-05-11 PROCEDURE — 84160 ASSAY OF PROTEIN ANY SOURCE: CPT

## 2019-05-13 ENCOUNTER — HOSPITAL ENCOUNTER (OUTPATIENT)
Facility: MEDICAL CENTER | Age: 64
End: 2019-05-13
Attending: NURSE PRACTITIONER
Payer: COMMERCIAL

## 2019-05-13 PROCEDURE — 83883 ASSAY NEPHELOMETRY NOT SPEC: CPT | Mod: 91

## 2019-05-13 PROCEDURE — 84156 ASSAY OF PROTEIN URINE: CPT

## 2019-05-14 LAB
ALBUMIN 24H UR QL ELPH: DETECTED
ALPHA1 GLOB 24H UR QL ELPH: DETECTED
ALPHA2 GLOB 24H UR QL ELPH: DETECTED
B-GLOBULIN UR QL ELPH: DETECTED
COLLECT DURATION TIME SPEC: NORMAL H
GAMMA GLOB UR QL ELPH: DETECTED
INTERPRETATION UR IFE-IMP: NORMAL
KAPPA LC FREE UR-MCNC: 0.42 MG/DL (ref 0.14–2.42)
KAPPA LC FREE/LAMBDA FREE UR: 8.4 RATIO (ref 2.04–10.37)
LAMBDA LC FREE UR-MCNC: 0.05 MG/DL (ref 0.02–0.67)
PROT 24H UR-MRATE: NORMAL MG/D (ref 10–140)
TOTAL VOLUME 1105: NORMAL ML

## 2019-05-15 LAB
ALBUMIN 24H UR QL ELPH: DETECTED
ALBUMIN SERPL-MCNC: 4.43 G/DL (ref 3.75–5.01)
ALPHA1 GLOB 24H UR QL ELPH: ABNORMAL
ALPHA1 GLOB SERPL ELPH-MCNC: 0.23 G/DL (ref 0.19–0.46)
ALPHA2 GLOB 24H UR QL ELPH: ABNORMAL
ALPHA2 GLOB SERPL ELPH-MCNC: 0.57 G/DL (ref 0.48–1.05)
B-GLOBULIN SERPL ELPH-MCNC: 0.6 G/DL (ref 0.48–1.1)
B-GLOBULIN UR QL ELPH: ABNORMAL
COLLECT DURATION TIME SPEC: 24 HRS
EER PROT ELECT SER Q1092: NORMAL
GAMMA GLOB SERPL ELPH-MCNC: 0.67 G/DL (ref 0.62–1.51)
GAMMA GLOB UR QL ELPH: DETECTED
INTERPRETATION SERPL IFE-IMP: NORMAL
INTERPRETATION UR IFE-IMP: ABNORMAL
KAPPA LC FREE UR-MCNC: 0.2 MG/DL (ref 0.14–2.42)
KAPPA LC FREE/LAMBDA FREE UR: 10 RATIO (ref 2.04–10.37)
LAMBDA LC FREE UR-MCNC: 0.02 MG/DL (ref 0.02–0.67)
PROT 24H UR-MRATE: 7 MG/D (ref 10–140)
PROT SERPL-MCNC: 6.5 G/DL (ref 6–8.3)
SPECIMEN VOL ?TM UR: 3000 ML

## 2019-07-12 ENCOUNTER — HOSPITAL ENCOUNTER (OUTPATIENT)
Dept: LAB | Facility: MEDICAL CENTER | Age: 64
End: 2019-07-12
Attending: NURSE PRACTITIONER
Payer: COMMERCIAL

## 2019-07-12 LAB
T4 FREE SERPL-MCNC: 1.14 NG/DL (ref 0.53–1.43)
TSH SERPL DL<=0.005 MIU/L-ACNC: 0.92 UIU/ML (ref 0.38–5.33)

## 2019-07-12 PROCEDURE — 36415 COLL VENOUS BLD VENIPUNCTURE: CPT

## 2019-07-12 PROCEDURE — 84439 ASSAY OF FREE THYROXINE: CPT

## 2019-07-12 PROCEDURE — 84443 ASSAY THYROID STIM HORMONE: CPT

## 2019-08-03 ENCOUNTER — HOSPITAL ENCOUNTER (OUTPATIENT)
Dept: LAB | Facility: MEDICAL CENTER | Age: 64
End: 2019-08-03
Attending: NURSE PRACTITIONER
Payer: COMMERCIAL

## 2019-08-03 LAB
ALBUMIN SERPL BCP-MCNC: 4.3 G/DL (ref 3.2–4.9)
ALBUMIN/GLOB SERPL: 2 G/DL
ALP SERPL-CCNC: 141 U/L (ref 30–99)
ALT SERPL-CCNC: 13 U/L (ref 2–50)
ANION GAP SERPL CALC-SCNC: 8 MMOL/L (ref 0–11.9)
AST SERPL-CCNC: 17 U/L (ref 12–45)
BASOPHILS # BLD AUTO: 2 % (ref 0–1.8)
BASOPHILS # BLD: 0.08 K/UL (ref 0–0.12)
BILIRUB SERPL-MCNC: 1.3 MG/DL (ref 0.1–1.5)
BUN SERPL-MCNC: 8 MG/DL (ref 8–22)
CALCIUM SERPL-MCNC: 9.2 MG/DL (ref 8.5–10.5)
CHLORIDE SERPL-SCNC: 108 MMOL/L (ref 96–112)
CO2 SERPL-SCNC: 30 MMOL/L (ref 20–33)
CREAT SERPL-MCNC: 0.65 MG/DL (ref 0.5–1.4)
CRP SERPL HS-MCNC: 0.07 MG/DL (ref 0–0.75)
EOSINOPHIL # BLD AUTO: 0.14 K/UL (ref 0–0.51)
EOSINOPHIL NFR BLD: 3.5 % (ref 0–6.9)
ERYTHROCYTE [DISTWIDTH] IN BLOOD BY AUTOMATED COUNT: 45.7 FL (ref 35.9–50)
ERYTHROCYTE [SEDIMENTATION RATE] IN BLOOD BY WESTERGREN METHOD: <1 MM/HOUR (ref 0–30)
FASTING STATUS PATIENT QL REPORTED: NORMAL
GLOBULIN SER CALC-MCNC: 2.2 G/DL (ref 1.9–3.5)
GLUCOSE SERPL-MCNC: 91 MG/DL (ref 65–99)
HCT VFR BLD AUTO: 43.2 % (ref 37–47)
HGB BLD-MCNC: 13.8 G/DL (ref 12–16)
HGB RETIC QN AUTO: 36.9 PG/CELL (ref 29–35)
IMM GRANULOCYTES # BLD AUTO: 0.01 K/UL (ref 0–0.11)
IMM GRANULOCYTES NFR BLD AUTO: 0.3 % (ref 0–0.9)
IMM RETICS NFR: 2.1 % (ref 9.3–17.4)
LDH SERPL L TO P-CCNC: 182 U/L (ref 107–266)
LYMPHOCYTES # BLD AUTO: 1.18 K/UL (ref 1–4.8)
LYMPHOCYTES NFR BLD: 29.6 % (ref 22–41)
MCH RBC QN AUTO: 33.8 PG (ref 27–33)
MCHC RBC AUTO-ENTMCNC: 31.9 G/DL (ref 33.6–35)
MCV RBC AUTO: 105.9 FL (ref 81.4–97.8)
MONOCYTES # BLD AUTO: 0.28 K/UL (ref 0–0.85)
MONOCYTES NFR BLD AUTO: 7 % (ref 0–13.4)
NEUTROPHILS # BLD AUTO: 2.29 K/UL (ref 2–7.15)
NEUTROPHILS NFR BLD: 57.6 % (ref 44–72)
NRBC # BLD AUTO: 0 K/UL
NRBC BLD-RTO: 0 /100 WBC
PLATELET # BLD AUTO: 356 K/UL (ref 164–446)
PMV BLD AUTO: 10.7 FL (ref 9–12.9)
POTASSIUM SERPL-SCNC: 4.5 MMOL/L (ref 3.6–5.5)
PROT SERPL-MCNC: 6.5 G/DL (ref 6–8.2)
RBC # BLD AUTO: 4.08 M/UL (ref 4.2–5.4)
RETICS # AUTO: 0.04 M/UL (ref 0.04–0.06)
RETICS/RBC NFR: 1.1 % (ref 0.8–2.1)
SODIUM SERPL-SCNC: 146 MMOL/L (ref 135–145)
VIT B12 SERPL-MCNC: 637 PG/ML (ref 211–911)
WBC # BLD AUTO: 4 K/UL (ref 4.8–10.8)

## 2019-08-03 PROCEDURE — 84165 PROTEIN E-PHORESIS SERUM: CPT

## 2019-08-03 PROCEDURE — 82607 VITAMIN B-12: CPT

## 2019-08-03 PROCEDURE — 85025 COMPLETE CBC W/AUTO DIFF WBC: CPT

## 2019-08-03 PROCEDURE — 85652 RBC SED RATE AUTOMATED: CPT

## 2019-08-03 PROCEDURE — 36415 COLL VENOUS BLD VENIPUNCTURE: CPT

## 2019-08-03 PROCEDURE — 84160 ASSAY OF PROTEIN ANY SOURCE: CPT

## 2019-08-03 PROCEDURE — 80053 COMPREHEN METABOLIC PANEL: CPT

## 2019-08-03 PROCEDURE — 83615 LACTATE (LD) (LDH) ENZYME: CPT

## 2019-08-03 PROCEDURE — 85046 RETICYTE/HGB CONCENTRATE: CPT

## 2019-08-03 PROCEDURE — 86140 C-REACTIVE PROTEIN: CPT

## 2019-08-05 ENCOUNTER — HOSPITAL ENCOUNTER (OUTPATIENT)
Facility: MEDICAL CENTER | Age: 64
End: 2019-08-05
Attending: NURSE PRACTITIONER
Payer: COMMERCIAL

## 2019-08-05 PROCEDURE — 83883 ASSAY NEPHELOMETRY NOT SPEC: CPT

## 2019-08-05 PROCEDURE — 84156 ASSAY OF PROTEIN URINE: CPT

## 2019-08-06 LAB
ALBUMIN SERPL ELPH-MCNC: 4.53 G/DL (ref 3.75–5.01)
ALPHA1 GLOB SERPL ELPH-MCNC: 0.27 G/DL (ref 0.19–0.46)
ALPHA2 GLOB SERPL ELPH-MCNC: 0.59 G/DL (ref 0.48–1.05)
B-GLOBULIN SERPL ELPH-MCNC: 0.69 G/DL (ref 0.48–1.1)
EER PROT ELECT SER Q1092: NORMAL
GAMMA GLOB SERPL ELPH-MCNC: 0.72 G/DL (ref 0.62–1.51)
INTERPRETATION SERPL IFE-IMP: NORMAL
PROT SERPL-MCNC: 6.8 G/DL (ref 6–8.3)

## 2019-08-08 LAB
ALBUMIN 24H UR QL ELPH: DETECTED
ALPHA1 GLOB 24H UR QL ELPH: DETECTED
ALPHA2 GLOB 24H UR QL ELPH: DETECTED
B-GLOBULIN UR QL ELPH: DETECTED
COLLECT DURATION TIME SPEC: 24 HRS
GAMMA GLOB UR QL ELPH: DETECTED
INTERPRETATION UR IFE-IMP: ABNORMAL
KAPPA LC FREE UR-MCNC: 0.18 MG/DL (ref 0.14–2.42)
KAPPA LC FREE/LAMBDA FREE UR: 9 RATIO (ref 2.04–10.37)
LAMBDA LC FREE UR-MCNC: 0.02 MG/DL (ref 0.02–0.67)
PROT 24H UR-MRATE: 9 MG/D (ref 10–140)
SPECIMEN VOL ?TM UR: 4400 ML

## 2019-08-11 ENCOUNTER — HOSPITAL ENCOUNTER (OUTPATIENT)
Dept: RADIOLOGY | Facility: MEDICAL CENTER | Age: 64
End: 2019-08-11
Attending: NURSE PRACTITIONER
Payer: COMMERCIAL

## 2019-08-11 DIAGNOSIS — R10.84 ABDOMINAL PAIN, GENERALIZED: ICD-10-CM

## 2019-08-11 PROCEDURE — 76700 US EXAM ABDOM COMPLETE: CPT

## 2019-09-02 ENCOUNTER — HOSPITAL ENCOUNTER (OUTPATIENT)
Facility: MEDICAL CENTER | Age: 64
End: 2019-09-02
Attending: HOSPITALIST | Admitting: HOSPITALIST
Payer: COMMERCIAL

## 2019-09-14 ENCOUNTER — HOSPITAL ENCOUNTER (OUTPATIENT)
Dept: LAB | Facility: MEDICAL CENTER | Age: 64
End: 2019-09-14
Attending: ALLERGY & IMMUNOLOGY
Payer: COMMERCIAL

## 2019-09-14 LAB
ALBUMIN SERPL BCP-MCNC: 4.9 G/DL (ref 3.2–4.9)
ALBUMIN/GLOB SERPL: 2.1 G/DL
ALP SERPL-CCNC: 157 U/L (ref 30–99)
ALT SERPL-CCNC: 11 U/L (ref 2–50)
ANION GAP SERPL CALC-SCNC: 6 MMOL/L (ref 0–11.9)
APPEARANCE UR: CLEAR
AST SERPL-CCNC: 17 U/L (ref 12–45)
BASOPHILS # BLD AUTO: 2.1 % (ref 0–1.8)
BASOPHILS # BLD: 0.06 K/UL (ref 0–0.12)
BILIRUB SERPL-MCNC: 1.3 MG/DL (ref 0.1–1.5)
BILIRUB UR QL STRIP.AUTO: NEGATIVE
BUN SERPL-MCNC: 6 MG/DL (ref 8–22)
CALCIUM SERPL-MCNC: 9.1 MG/DL (ref 8.5–10.5)
CHLORIDE SERPL-SCNC: 103 MMOL/L (ref 96–112)
CO2 SERPL-SCNC: 30 MMOL/L (ref 20–33)
COLOR UR: YELLOW
CREAT SERPL-MCNC: 0.57 MG/DL (ref 0.5–1.4)
EOSINOPHIL # BLD AUTO: 0.1 K/UL (ref 0–0.51)
EOSINOPHIL NFR BLD: 3.5 % (ref 0–6.9)
ERYTHROCYTE [DISTWIDTH] IN BLOOD BY AUTOMATED COUNT: 43.3 FL (ref 35.9–50)
GLOBULIN SER CALC-MCNC: 2.3 G/DL (ref 1.9–3.5)
GLUCOSE SERPL-MCNC: 96 MG/DL (ref 65–99)
GLUCOSE UR STRIP.AUTO-MCNC: NEGATIVE MG/DL
HCT VFR BLD AUTO: 42.9 % (ref 37–47)
HGB BLD-MCNC: 13.9 G/DL (ref 12–16)
IMM GRANULOCYTES # BLD AUTO: 0 K/UL (ref 0–0.11)
IMM GRANULOCYTES NFR BLD AUTO: 0 % (ref 0–0.9)
KETONES UR STRIP.AUTO-MCNC: NEGATIVE MG/DL
LEUKOCYTE ESTERASE UR QL STRIP.AUTO: NEGATIVE
LYMPHOCYTES # BLD AUTO: 0.91 K/UL (ref 1–4.8)
LYMPHOCYTES NFR BLD: 32.3 % (ref 22–41)
MCH RBC QN AUTO: 34.1 PG (ref 27–33)
MCHC RBC AUTO-ENTMCNC: 32.4 G/DL (ref 33.6–35)
MCV RBC AUTO: 105.1 FL (ref 81.4–97.8)
MICRO URNS: NORMAL
MONOCYTES # BLD AUTO: 0.19 K/UL (ref 0–0.85)
MONOCYTES NFR BLD AUTO: 6.7 % (ref 0–13.4)
NEUTROPHILS # BLD AUTO: 1.56 K/UL (ref 2–7.15)
NEUTROPHILS NFR BLD: 55.4 % (ref 44–72)
NITRITE UR QL STRIP.AUTO: NEGATIVE
NRBC # BLD AUTO: 0 K/UL
NRBC BLD-RTO: 0 /100 WBC
PH UR STRIP.AUTO: 8 [PH] (ref 5–8)
PLATELET # BLD AUTO: 314 K/UL (ref 164–446)
PMV BLD AUTO: 10.9 FL (ref 9–12.9)
POTASSIUM SERPL-SCNC: 4.2 MMOL/L (ref 3.6–5.5)
PROT SERPL-MCNC: 7.2 G/DL (ref 6–8.2)
PROT UR QL STRIP: NEGATIVE MG/DL
RBC # BLD AUTO: 4.08 M/UL (ref 4.2–5.4)
RBC UR QL AUTO: NEGATIVE
SODIUM SERPL-SCNC: 139 MMOL/L (ref 135–145)
SP GR UR STRIP.AUTO: 1.01
UROBILINOGEN UR STRIP.AUTO-MCNC: 0.2 MG/DL
WBC # BLD AUTO: 2.8 K/UL (ref 4.8–10.8)

## 2019-09-14 PROCEDURE — 80053 COMPREHEN METABOLIC PANEL: CPT

## 2019-09-14 PROCEDURE — 81003 URINALYSIS AUTO W/O SCOPE: CPT

## 2019-09-14 PROCEDURE — 85025 COMPLETE CBC W/AUTO DIFF WBC: CPT

## 2019-09-14 PROCEDURE — 86376 MICROSOMAL ANTIBODY EACH: CPT

## 2019-09-14 PROCEDURE — 36415 COLL VENOUS BLD VENIPUNCTURE: CPT

## 2019-09-14 PROCEDURE — 83520 IMMUNOASSAY QUANT NOS NONAB: CPT

## 2019-09-14 PROCEDURE — 87086 URINE CULTURE/COLONY COUNT: CPT

## 2019-09-16 ENCOUNTER — HOSPITAL ENCOUNTER (OUTPATIENT)
Facility: MEDICAL CENTER | Age: 64
End: 2019-09-16
Attending: ALLERGY & IMMUNOLOGY
Payer: COMMERCIAL

## 2019-09-16 LAB
BACTERIA UR CULT: NORMAL
SIGNIFICANT IND 70042: NORMAL
SITE SITE: NORMAL
SOURCE SOURCE: NORMAL
THYROPEROXIDASE AB SERPL-ACNC: 1.7 IU/ML (ref 0–9)

## 2019-09-16 PROCEDURE — 83088 ASSAY OF HISTAMINE: CPT

## 2019-09-17 LAB — TRYPTASE SERPL-MCNC: 4.1 UG/L

## 2019-09-21 LAB
COLLECT DURATION TIME SPEC: 24 HRS
CREAT 24H UR-MCNC: 19 MG/DL
HISTAMINE 24H UR-MRATE: 45 UG/D (ref 0–60)
HISTAMINE UR-SCNC: 110 NMOL/L
TOTAL VOLUME 1105: 3700 ML

## 2020-01-25 ENCOUNTER — HOSPITAL ENCOUNTER (OUTPATIENT)
Dept: LAB | Facility: MEDICAL CENTER | Age: 65
End: 2020-01-25
Attending: NURSE PRACTITIONER
Payer: COMMERCIAL

## 2020-01-25 LAB
25(OH)D3 SERPL-MCNC: 21 NG/ML (ref 30–100)
25(OH)D3 SERPL-MCNC: 25 NG/ML (ref 30–100)
ALBUMIN SERPL BCP-MCNC: 4.4 G/DL (ref 3.2–4.9)
ALBUMIN SERPL BCP-MCNC: 4.6 G/DL (ref 3.2–4.9)
ALBUMIN/GLOB SERPL: 1.7 G/DL
ALBUMIN/GLOB SERPL: 1.8 G/DL
ALP SERPL-CCNC: 162 U/L (ref 30–99)
ALP SERPL-CCNC: 162 U/L (ref 30–99)
ALT SERPL-CCNC: 11 U/L (ref 2–50)
ALT SERPL-CCNC: 11 U/L (ref 2–50)
ANION GAP SERPL CALC-SCNC: 7 MMOL/L (ref 0–11.9)
ANION GAP SERPL CALC-SCNC: 9 MMOL/L (ref 0–11.9)
AST SERPL-CCNC: 17 U/L (ref 12–45)
AST SERPL-CCNC: 18 U/L (ref 12–45)
BASOPHILS # BLD AUTO: 1.8 % (ref 0–1.8)
BASOPHILS # BLD AUTO: 1.9 % (ref 0–1.8)
BASOPHILS # BLD: 0.06 K/UL (ref 0–0.12)
BASOPHILS # BLD: 0.06 K/UL (ref 0–0.12)
BILIRUB SERPL-MCNC: 1.1 MG/DL (ref 0.1–1.5)
BILIRUB SERPL-MCNC: 1.2 MG/DL (ref 0.1–1.5)
BUN SERPL-MCNC: 9 MG/DL (ref 8–22)
BUN SERPL-MCNC: 9 MG/DL (ref 8–22)
CALCIUM SERPL-MCNC: 9.5 MG/DL (ref 8.5–10.5)
CALCIUM SERPL-MCNC: 9.6 MG/DL (ref 8.5–10.5)
CHLORIDE SERPL-SCNC: 103 MMOL/L (ref 96–112)
CHLORIDE SERPL-SCNC: 104 MMOL/L (ref 96–112)
CHOLEST SERPL-MCNC: 159 MG/DL (ref 100–199)
CO2 SERPL-SCNC: 28 MMOL/L (ref 20–33)
CO2 SERPL-SCNC: 28 MMOL/L (ref 20–33)
CREAT SERPL-MCNC: 0.57 MG/DL (ref 0.5–1.4)
CREAT SERPL-MCNC: 0.6 MG/DL (ref 0.5–1.4)
EOSINOPHIL # BLD AUTO: 0.12 K/UL (ref 0–0.51)
EOSINOPHIL # BLD AUTO: 0.13 K/UL (ref 0–0.51)
EOSINOPHIL NFR BLD: 3.7 % (ref 0–6.9)
EOSINOPHIL NFR BLD: 4.1 % (ref 0–6.9)
ERYTHROCYTE [DISTWIDTH] IN BLOOD BY AUTOMATED COUNT: 42.5 FL (ref 35.9–50)
ERYTHROCYTE [DISTWIDTH] IN BLOOD BY AUTOMATED COUNT: 42.6 FL (ref 35.9–50)
EST. AVERAGE GLUCOSE BLD GHB EST-MCNC: 105 MG/DL
FASTING STATUS PATIENT QL REPORTED: NORMAL
FASTING STATUS PATIENT QL REPORTED: NORMAL
FOLATE SERPL-MCNC: 9.5 NG/ML
GLOBULIN SER CALC-MCNC: 2.5 G/DL (ref 1.9–3.5)
GLOBULIN SER CALC-MCNC: 2.6 G/DL (ref 1.9–3.5)
GLUCOSE SERPL-MCNC: 90 MG/DL (ref 65–99)
GLUCOSE SERPL-MCNC: 91 MG/DL (ref 65–99)
HBA1C MFR BLD: 5.3 % (ref 0–5.6)
HCT VFR BLD AUTO: 39.1 % (ref 37–47)
HCT VFR BLD AUTO: 39.6 % (ref 37–47)
HDLC SERPL-MCNC: 61 MG/DL
HGB BLD-MCNC: 13.4 G/DL (ref 12–16)
HGB BLD-MCNC: 13.5 G/DL (ref 12–16)
IMM GRANULOCYTES # BLD AUTO: 0 K/UL (ref 0–0.11)
IMM GRANULOCYTES # BLD AUTO: 0.01 K/UL (ref 0–0.11)
IMM GRANULOCYTES NFR BLD AUTO: 0 % (ref 0–0.9)
IMM GRANULOCYTES NFR BLD AUTO: 0.3 % (ref 0–0.9)
IRON SATN MFR SERPL: 39 % (ref 15–55)
IRON SERPL-MCNC: 135 UG/DL (ref 40–170)
LDLC SERPL CALC-MCNC: 87 MG/DL
LYMPHOCYTES # BLD AUTO: 1.11 K/UL (ref 1–4.8)
LYMPHOCYTES # BLD AUTO: 1.11 K/UL (ref 1–4.8)
LYMPHOCYTES NFR BLD: 33.9 % (ref 22–41)
LYMPHOCYTES NFR BLD: 35.1 % (ref 22–41)
MCH RBC QN AUTO: 35.2 PG (ref 27–33)
MCH RBC QN AUTO: 35.3 PG (ref 27–33)
MCHC RBC AUTO-ENTMCNC: 34.1 G/DL (ref 33.6–35)
MCHC RBC AUTO-ENTMCNC: 34.3 G/DL (ref 33.6–35)
MCV RBC AUTO: 102.9 FL (ref 81.4–97.8)
MCV RBC AUTO: 103.1 FL (ref 81.4–97.8)
MONOCYTES # BLD AUTO: 0.24 K/UL (ref 0–0.85)
MONOCYTES # BLD AUTO: 0.29 K/UL (ref 0–0.85)
MONOCYTES NFR BLD AUTO: 7.6 % (ref 0–13.4)
MONOCYTES NFR BLD AUTO: 8.9 % (ref 0–13.4)
NEUTROPHILS # BLD AUTO: 1.62 K/UL (ref 2–7.15)
NEUTROPHILS # BLD AUTO: 1.68 K/UL (ref 2–7.15)
NEUTROPHILS NFR BLD: 51.3 % (ref 44–72)
NEUTROPHILS NFR BLD: 51.4 % (ref 44–72)
NRBC # BLD AUTO: 0 K/UL
NRBC # BLD AUTO: 0 K/UL
NRBC BLD-RTO: 0 /100 WBC
NRBC BLD-RTO: 0 /100 WBC
PLATELET # BLD AUTO: 327 K/UL (ref 164–446)
PLATELET # BLD AUTO: 348 K/UL (ref 164–446)
PMV BLD AUTO: 10.8 FL (ref 9–12.9)
PMV BLD AUTO: 10.9 FL (ref 9–12.9)
POTASSIUM SERPL-SCNC: 3.9 MMOL/L (ref 3.6–5.5)
POTASSIUM SERPL-SCNC: 3.9 MMOL/L (ref 3.6–5.5)
PROT SERPL-MCNC: 7 G/DL (ref 6–8.2)
PROT SERPL-MCNC: 7.1 G/DL (ref 6–8.2)
RBC # BLD AUTO: 3.8 M/UL (ref 4.2–5.4)
RBC # BLD AUTO: 3.84 M/UL (ref 4.2–5.4)
SODIUM SERPL-SCNC: 139 MMOL/L (ref 135–145)
SODIUM SERPL-SCNC: 140 MMOL/L (ref 135–145)
T4 FREE SERPL-MCNC: 0.95 NG/DL (ref 0.53–1.43)
TIBC SERPL-MCNC: 350 UG/DL (ref 250–450)
TRIGL SERPL-MCNC: 57 MG/DL (ref 0–149)
TSH SERPL DL<=0.005 MIU/L-ACNC: 1.13 UIU/ML (ref 0.38–5.33)
TSH SERPL DL<=0.005 MIU/L-ACNC: 1.16 UIU/ML (ref 0.38–5.33)
VIT B12 SERPL-MCNC: 632 PG/ML (ref 211–911)
VIT B12 SERPL-MCNC: 653 PG/ML (ref 211–911)
WBC # BLD AUTO: 3.2 K/UL (ref 4.8–10.8)
WBC # BLD AUTO: 3.3 K/UL (ref 4.8–10.8)

## 2020-01-25 PROCEDURE — 83516 IMMUNOASSAY NONANTIBODY: CPT | Mod: 91

## 2020-01-25 PROCEDURE — 82607 VITAMIN B-12: CPT

## 2020-01-25 PROCEDURE — 82306 VITAMIN D 25 HYDROXY: CPT | Mod: 91

## 2020-01-25 PROCEDURE — 80053 COMPREHEN METABOLIC PANEL: CPT

## 2020-01-25 PROCEDURE — 82746 ASSAY OF FOLIC ACID SERUM: CPT

## 2020-01-25 PROCEDURE — 84439 ASSAY OF FREE THYROXINE: CPT

## 2020-01-25 PROCEDURE — 84443 ASSAY THYROID STIM HORMONE: CPT

## 2020-01-25 PROCEDURE — 82306 VITAMIN D 25 HYDROXY: CPT

## 2020-01-25 PROCEDURE — 83036 HEMOGLOBIN GLYCOSYLATED A1C: CPT

## 2020-01-25 PROCEDURE — 80053 COMPREHEN METABOLIC PANEL: CPT | Mod: 91

## 2020-01-25 PROCEDURE — 86225 DNA ANTIBODY NATIVE: CPT

## 2020-01-25 PROCEDURE — 84630 ASSAY OF ZINC: CPT

## 2020-01-25 PROCEDURE — 85025 COMPLETE CBC W/AUTO DIFF WBC: CPT

## 2020-01-25 PROCEDURE — 36415 COLL VENOUS BLD VENIPUNCTURE: CPT

## 2020-01-25 PROCEDURE — 86235 NUCLEAR ANTIGEN ANTIBODY: CPT

## 2020-01-25 PROCEDURE — 85025 COMPLETE CBC W/AUTO DIFF WBC: CPT | Mod: 91

## 2020-01-25 PROCEDURE — 83550 IRON BINDING TEST: CPT

## 2020-01-25 PROCEDURE — 80061 LIPID PANEL: CPT

## 2020-01-25 PROCEDURE — 84443 ASSAY THYROID STIM HORMONE: CPT | Mod: 91

## 2020-01-25 PROCEDURE — 82607 VITAMIN B-12: CPT | Mod: 91

## 2020-01-25 PROCEDURE — 83540 ASSAY OF IRON: CPT

## 2020-01-27 LAB
CENTROMERE IGG TITR SER IF: 0 AU/ML (ref 0–40)
CHROMATIN IGG SERPL-ACNC: 13 UNITS (ref 0–19)
DSDNA AB TITR SER CLIF: NORMAL {TITER}
ENA JO1 AB TITR SER: 0 AU/ML (ref 0–40)
ENA SCL70 IGG SER QL: 1 AU/ML (ref 0–40)
ENA SM IGG SER-ACNC: 1 AU/ML (ref 0–40)
ENA SS-B IGG SER IA-ACNC: 0 AU/ML (ref 0–40)
SSA52 R0ENA AB IGG Q0420: 1 AU/ML (ref 0–40)
SSA60 R0ENA AB IGG Q0419: 0 AU/ML (ref 0–40)
U1 SNRNP IGG SER QL: 4 AU/ML (ref 0–40)
ZINC SERPL-MCNC: 92.2 UG/DL (ref 60–120)

## 2020-03-07 ENCOUNTER — HOSPITAL ENCOUNTER (EMERGENCY)
Facility: MEDICAL CENTER | Age: 65
End: 2020-03-07
Attending: EMERGENCY MEDICINE
Payer: COMMERCIAL

## 2020-03-07 VITALS
SYSTOLIC BLOOD PRESSURE: 97 MMHG | HEIGHT: 60 IN | DIASTOLIC BLOOD PRESSURE: 57 MMHG | BODY MASS INDEX: 20.34 KG/M2 | RESPIRATION RATE: 19 BRPM | HEART RATE: 86 BPM | TEMPERATURE: 97.8 F | WEIGHT: 103.62 LBS | OXYGEN SATURATION: 97 %

## 2020-03-07 DIAGNOSIS — T78.40XA ALLERGIC REACTION, INITIAL ENCOUNTER: ICD-10-CM

## 2020-03-07 PROCEDURE — 99284 EMERGENCY DEPT VISIT MOD MDM: CPT

## 2020-03-07 PROCEDURE — 700111 HCHG RX REV CODE 636 W/ 250 OVERRIDE (IP): Performed by: EMERGENCY MEDICINE

## 2020-03-07 PROCEDURE — 96374 THER/PROPH/DIAG INJ IV PUSH: CPT

## 2020-03-07 PROCEDURE — 96372 THER/PROPH/DIAG INJ SC/IM: CPT

## 2020-03-07 RX ORDER — METHYLPREDNISOLONE 4 MG/1
TABLET ORAL
Qty: 1 PACKAGE | Refills: 0 | Status: SHIPPED | OUTPATIENT
Start: 2020-03-07 | End: 2020-04-19

## 2020-03-07 RX ORDER — METHYLPREDNISOLONE SODIUM SUCCINATE 125 MG/2ML
125 INJECTION, POWDER, LYOPHILIZED, FOR SOLUTION INTRAMUSCULAR; INTRAVENOUS ONCE
Status: COMPLETED | OUTPATIENT
Start: 2020-03-07 | End: 2020-03-07

## 2020-03-07 RX ORDER — EPINEPHRINE 1 MG/ML(1)
0.5 AMPUL (ML) INJECTION ONCE
Status: COMPLETED | OUTPATIENT
Start: 2020-03-07 | End: 2020-03-07

## 2020-03-07 RX ADMIN — METHYLPREDNISOLONE SODIUM SUCCINATE 125 MG: 125 INJECTION, POWDER, FOR SOLUTION INTRAMUSCULAR; INTRAVENOUS at 18:19

## 2020-03-07 RX ADMIN — EPINEPHRINE 0.5 MG: 1 INJECTION INTRAMUSCULAR; INTRAVENOUS; SUBCUTANEOUS at 18:23

## 2020-03-07 ASSESSMENT — FIBROSIS 4 INDEX: FIB4 SCORE: 0.97

## 2020-03-08 NOTE — ED NOTES
"Pt. Skin PWD at this time, reports feeling \"less itchy, and much better, almost normal\". Speaking in full sentences. Respirations e/u.  "

## 2020-03-08 NOTE — ED PROVIDER NOTES
ED Provider Note    CHIEF COMPLAINT  Chief Complaint   Patient presents with   • Allergic Reaction   • Rash       HPI  Aminah Maldonado is a 64 y.o. female who presents for evaluation of an allergic reaction.  Patient states that she actually has a history of these episodes.  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  Today she had acute onset of rash and took some Zyrtec.  She thinks it starting to help a bit.  She never had any oral swelling or difficulty breathing or swallowing.  She knows of no precipitating factors.    REVIEW OF SYSTEMS  See HPI for further details. All other systems negative.    PAST MEDICAL HISTORY  Past Medical History:   Diagnosis Date   • Endometriosis        FAMILY HISTORY  Family History   Problem Relation Age of Onset   • Heart Disease Mother    • Lung Disease Father    • Cancer Father 81        lung   • Diabetes Father    • Lung Disease Sister    • Cancer Sister 55        lung   • Diabetes Brother    • Kidney Disease Brother 49        on dialysis       SOCIAL HISTORY  Social History     Socioeconomic History   • Marital status: Single     Spouse name: Not on file   • Number of children: 0   • Years of education: Not on file   • Highest education level: Not on file   Occupational History   • Not on file   Social Needs   • Financial resource strain: Not on file   • Food insecurity     Worry: Not on file     Inability: Not on file   • Transportation needs     Medical: Not on file     Non-medical: Not on file   Tobacco Use   • Smoking status: Never Smoker   • Smokeless tobacco: Never Used   Substance and Sexual Activity   • Alcohol use: No     Alcohol/week: 1.2 oz     Types: 2 Shots of liquor per week     Comment: Rarely   • Drug use: No   • Sexual activity: Never   Lifestyle   • Physical activity     Days per week: Not on file     Minutes per session: Not on file   • Stress: Not on file   Relationships   • Social connections     Talks on phone:  Not on file     Gets together: Not on file     Attends [employer] service: Not on file     Active member of club or organization: Not on file     Attends meetings of clubs or organizations: Not on file     Relationship status: Not on file   • Intimate partner violence     Fear of current or ex partner: Not on file     Emotionally abused: Not on file     Physically abused: Not on file     Forced sexual activity: Not on file   Other Topics Concern   • Not on file   Social History Narrative   • Not on file       SURGICAL HISTORY  Past Surgical History:   Procedure Laterality Date   • ABDOMINAL HYSTERECTOMY TOTAL      and BSO for endometriosis   • APPENDECTOMY     • TONSILLECTOMY AND ADENOIDECTOMY         CURRENT MEDICATIONS  Home Medications     Reviewed by Chaitanya Ayala R.N. (Registered Nurse) on 03/07/20 at 1744  Med List Status: Partial   Medication Last Dose Status   cetirizine (ZYRTEC) 10 MG Tab 3/7/2020 Active   MethylPREDNISolone (MEDROL DOSEPAK) 4 MG Tablet Therapy Pack Not taking Active                ALLERGIES  Allergies   Allergen Reactions   • Cefdinir Rash   • Contrast Media With Iodine [Iodine] Hives and Shortness of Breath   • Doxycycline Hyclate Rash       PHYSICAL EXAM  VITAL SIGNS: /69   Pulse 92   Temp 36.8 °C (98.2 °F) (Temporal)   Resp 19   Ht 1.524 m (5')   Wt 47 kg (103 lb 9.9 oz)   SpO2 99%   BMI 20.24 kg/m²   Constitutional: Well developed, Well nourished, No acute distress, Non-toxic appearance.   HENT: Normocephalic, Atraumatic, no oral swelling.  Eyes:  EOMI, urticaria to bilateral upper and lower eyelids.  Neck: Normal range of motion, No stridor.   Cardiovascular: Normal heart rate, Normal rhythm, No murmurs, No rubs, No gallops.   Thorax & Lungs: Clear to auscultation without wheezes, rales, or rhonchi.    Skin: Warm, Dry.  Diffuse urticarial rash.  Musculoskeletal: Good range of motion in all major joints.  Neurologic: Awake and alert, No focal deficits noted.       COURSE &  MEDICAL DECISION MAKING  Pertinent Labs & Imaging studies reviewed. (See chart for details)  This is a 64-year-old here for evaluation of a rash.  She states that she has a history of idiopathic angioedema.  She has an urticarial rash today with no ilene angioedema.  She is treated with Solu-Medrol IV and epinephrine IM.  Upon repeat evaluation she is greatly improved.  Her rash is essentially gone at this point.  I do not think she requires acute hospitalization.  I will provide a prescription for Medrol Dosepak.  She has an EpiPen at home.  I have explained this may represent a hereditary angioedema.  Since she was seen by an allergist the may have already tested her for that but I do not have access to that information.  I will have her follow-up with her allergist.  She should return to the closest emergency department for any worsening symptoms.    FINAL IMPRESSION  1.  Urticarial rash  2.   3.         Electronically signed by: Krishan Masterson M.D., 3/7/2020 7:02 PM

## 2020-03-08 NOTE — ED NOTES
Assisting with care for d/c only. Dc instructions and prescription reviewed with pt. To f/u with pc and/or allergist, return for worsening s/s

## 2020-03-08 NOTE — DISCHARGE INSTRUCTIONS
Your symptoms may represent hereditary angioedema.  Follow-up with your allergist.  Use your EpiPen as needed.  Return to the nearest emergency department for any recurrent symptoms.

## 2020-03-08 NOTE — ED TRIAGE NOTES
"Resents complaining of an allergic reaction to an unknown precipitator.  She denies any respiratory compromise currently, and he has self administered \"2 Zyrtec\" right PTA.   C/O acute onset of a pruritic diffuse body rash.  She reports recurrent similar episodes. Pt denies any domestic or international travel within the past 14 days.  Pt does not meet the critical criteria for COVID-19 isolation.   Chief Complaint   Patient presents with   • Allergic Reaction   • Rash       /73   Pulse (!) 116   Temp 36.8 °C (98.2 °F) (Temporal)   Resp 19   Ht 1.524 m (5')   Wt 47 kg (103 lb 9.9 oz)   SpO2 97%   BMI 20.24 kg/m²     "

## 2020-04-19 ENCOUNTER — HOSPITAL ENCOUNTER (OUTPATIENT)
Dept: RADIOLOGY | Facility: MEDICAL CENTER | Age: 65
End: 2020-04-19
Attending: FAMILY MEDICINE
Payer: COMMERCIAL

## 2020-04-19 ENCOUNTER — OFFICE VISIT (OUTPATIENT)
Dept: URGENT CARE | Facility: PHYSICIAN GROUP | Age: 65
End: 2020-04-19
Payer: COMMERCIAL

## 2020-04-19 VITALS
OXYGEN SATURATION: 99 % | BODY MASS INDEX: 20.62 KG/M2 | SYSTOLIC BLOOD PRESSURE: 100 MMHG | WEIGHT: 105 LBS | TEMPERATURE: 98.4 F | HEIGHT: 60 IN | DIASTOLIC BLOOD PRESSURE: 72 MMHG | HEART RATE: 88 BPM

## 2020-04-19 DIAGNOSIS — R07.89 CHEST DISCOMFORT: ICD-10-CM

## 2020-04-19 DIAGNOSIS — R10.11 RIGHT UPPER QUADRANT ABDOMINAL PAIN: ICD-10-CM

## 2020-04-19 PROCEDURE — 99214 OFFICE O/P EST MOD 30 MIN: CPT | Performed by: FAMILY MEDICINE

## 2020-04-19 PROCEDURE — 71046 X-RAY EXAM CHEST 2 VIEWS: CPT

## 2020-04-19 ASSESSMENT — FIBROSIS 4 INDEX: FIB4 SCORE: 0.97

## 2020-04-19 NOTE — PROGRESS NOTES
Subjective:      Aminah Maldonado is a 64 y.o. female who presents with Pain (pain by ribs for months, SOB)      - This is a pleasant and non toxic appearing 64 y.o. female with c/o on/off Rt sided chest pain x 2 yrs. More constant  past 2-3 months. Worse if tries to take a deep breath (says hurts a little more so hard to take a full deep deep breath but no true SOB). Rubbing area makes it better. No associated exertional symptoms or NVFC. No cough or bloody sputum. She is able to drink/eat well.             ALLERGIES:  Cefdinir; Contrast media with iodine [iodine]; and Doxycycline hyclate     PMH:  Past Medical History:   Diagnosis Date   • Endometriosis         PSH:  Past Surgical History:   Procedure Laterality Date   • ABDOMINAL HYSTERECTOMY TOTAL      and BSO for endometriosis   • APPENDECTOMY     • TONSILLECTOMY AND ADENOIDECTOMY         MEDS:  No current outpatient medications on file.    ** I have documented what I find to be significant in regards to past medical, social, family and surgical history  in my HPI or under PMH/PSH/FH review section, otherwise it is contributory **           HPI    Review of Systems   Cardiovascular: Positive for chest pain.   All other systems reviewed and are negative.         Objective:     /72   Pulse 88   Temp 36.9 °C (98.4 °F)   Ht 1.524 m (5')   Wt 47.6 kg (105 lb)   SpO2 99%   BMI 20.51 kg/m²      Physical Exam  Vitals signs and nursing note reviewed.   Constitutional:       General: She is not in acute distress.     Appearance: She is well-developed. She is not diaphoretic.   HENT:      Head: Normocephalic and atraumatic.   Eyes:      Conjunctiva/sclera: Conjunctivae normal.   Cardiovascular:      Heart sounds: Normal heart sounds. No murmur.   Pulmonary:      Effort: Pulmonary effort is normal. No respiratory distress.      Breath sounds: Normal breath sounds. No wheezing, rhonchi or rales.   Abdominal:      General: Abdomen is flat.      Palpations:  Abdomen is soft.      Tenderness: There is no abdominal tenderness. There is no guarding or rebound.      Hernia: No hernia is present.       Skin:     General: Skin is warm and dry.   Neurological:      Mental Status: She is alert.      Motor: No abnormal muscle tone.   Psychiatric:         Judgment: Judgment normal.                 Assessment/Plan:           1. Chest discomfort  DX-CHEST-2 VIEWS     * nonspecific, may be gastric related    - rest  - trial of OTC PPI   - E.R. precautions discussed     Dx & d/c instructions discussed w/ patient and/or family members.     Follow up with PCP  in 2-3 days to make sure improving and no further additional treatment needed, ER if not improving or feeling/getting worse.    Any realistic and/or common medication side effects that may have been given today(i.e. Rash, GI upset/constipation, sedation, elevation of BP or blood sugars) reviewed.     Patient left in stable condition      reviewed if narcotics given

## 2020-08-29 ENCOUNTER — HOSPITAL ENCOUNTER (EMERGENCY)
Facility: MEDICAL CENTER | Age: 65
End: 2020-08-29
Attending: EMERGENCY MEDICINE
Payer: COMMERCIAL

## 2020-08-29 ENCOUNTER — APPOINTMENT (OUTPATIENT)
Dept: RADIOLOGY | Facility: MEDICAL CENTER | Age: 65
End: 2020-08-29
Attending: EMERGENCY MEDICINE
Payer: COMMERCIAL

## 2020-08-29 VITALS
HEIGHT: 60 IN | RESPIRATION RATE: 17 BRPM | BODY MASS INDEX: 19.48 KG/M2 | TEMPERATURE: 98 F | SYSTOLIC BLOOD PRESSURE: 129 MMHG | OXYGEN SATURATION: 96 % | HEART RATE: 71 BPM | DIASTOLIC BLOOD PRESSURE: 73 MMHG | WEIGHT: 99.21 LBS

## 2020-08-29 DIAGNOSIS — R53.83 FATIGUE, UNSPECIFIED TYPE: ICD-10-CM

## 2020-08-29 LAB
ALBUMIN SERPL BCP-MCNC: 4.4 G/DL (ref 3.2–4.9)
ALBUMIN/GLOB SERPL: 1.9 G/DL
ALP SERPL-CCNC: 159 U/L (ref 30–99)
ALT SERPL-CCNC: 10 U/L (ref 2–50)
ANION GAP SERPL CALC-SCNC: 11 MMOL/L (ref 7–16)
APPEARANCE UR: CLEAR
AST SERPL-CCNC: 16 U/L (ref 12–45)
BASOPHILS # BLD AUTO: 0.8 % (ref 0–1.8)
BASOPHILS # BLD: 0.05 K/UL (ref 0–0.12)
BILIRUB SERPL-MCNC: 1.4 MG/DL (ref 0.1–1.5)
BILIRUB UR QL STRIP.AUTO: NEGATIVE
BUN SERPL-MCNC: 10 MG/DL (ref 8–22)
CALCIUM SERPL-MCNC: 9.4 MG/DL (ref 8.4–10.2)
CHLORIDE SERPL-SCNC: 103 MMOL/L (ref 96–112)
CO2 SERPL-SCNC: 26 MMOL/L (ref 20–33)
COLOR UR: NORMAL
CREAT SERPL-MCNC: 0.66 MG/DL (ref 0.5–1.4)
EKG IMPRESSION: NORMAL
EOSINOPHIL # BLD AUTO: 0.1 K/UL (ref 0–0.51)
EOSINOPHIL NFR BLD: 1.6 % (ref 0–6.9)
ERYTHROCYTE [DISTWIDTH] IN BLOOD BY AUTOMATED COUNT: 41.8 FL (ref 35.9–50)
GLOBULIN SER CALC-MCNC: 2.3 G/DL (ref 1.9–3.5)
GLUCOSE SERPL-MCNC: 100 MG/DL (ref 65–99)
GLUCOSE UR STRIP.AUTO-MCNC: NEGATIVE MG/DL
HCT VFR BLD AUTO: 39 % (ref 37–47)
HGB BLD-MCNC: 13.4 G/DL (ref 12–16)
IMM GRANULOCYTES # BLD AUTO: 0.02 K/UL (ref 0–0.11)
IMM GRANULOCYTES NFR BLD AUTO: 0.3 % (ref 0–0.9)
KETONES UR STRIP.AUTO-MCNC: NEGATIVE MG/DL
LEUKOCYTE ESTERASE UR QL STRIP.AUTO: NEGATIVE
LIPASE SERPL-CCNC: 34 U/L (ref 7–58)
LYMPHOCYTES # BLD AUTO: 1.27 K/UL (ref 1–4.8)
LYMPHOCYTES NFR BLD: 20.3 % (ref 22–41)
MCH RBC QN AUTO: 34.4 PG (ref 27–33)
MCHC RBC AUTO-ENTMCNC: 34.4 G/DL (ref 33.6–35)
MCV RBC AUTO: 100.3 FL (ref 81.4–97.8)
MICRO URNS: NORMAL
MONOCYTES # BLD AUTO: 0.34 K/UL (ref 0–0.85)
MONOCYTES NFR BLD AUTO: 5.4 % (ref 0–13.4)
NEUTROPHILS # BLD AUTO: 4.48 K/UL (ref 2–7.15)
NEUTROPHILS NFR BLD: 71.6 % (ref 44–72)
NITRITE UR QL STRIP.AUTO: NEGATIVE
NRBC # BLD AUTO: 0 K/UL
NRBC BLD-RTO: 0 /100 WBC
PH UR STRIP.AUTO: 6.5 [PH] (ref 5–8)
PLATELET # BLD AUTO: 347 K/UL (ref 164–446)
PMV BLD AUTO: 10.1 FL (ref 9–12.9)
POTASSIUM SERPL-SCNC: 4.1 MMOL/L (ref 3.6–5.5)
PROT SERPL-MCNC: 6.7 G/DL (ref 6–8.2)
PROT UR QL STRIP: NEGATIVE MG/DL
RBC # BLD AUTO: 3.89 M/UL (ref 4.2–5.4)
RBC UR QL AUTO: NEGATIVE
SODIUM SERPL-SCNC: 140 MMOL/L (ref 135–145)
SP GR UR STRIP.AUTO: <=1.005
TROPONIN T SERPL-MCNC: <6 NG/L (ref 6–19)
TROPONIN T SERPL-MCNC: <6 NG/L (ref 6–19)
WBC # BLD AUTO: 6.3 K/UL (ref 4.8–10.8)

## 2020-08-29 PROCEDURE — 83690 ASSAY OF LIPASE: CPT

## 2020-08-29 PROCEDURE — 80053 COMPREHEN METABOLIC PANEL: CPT

## 2020-08-29 PROCEDURE — 84484 ASSAY OF TROPONIN QUANT: CPT

## 2020-08-29 PROCEDURE — 81003 URINALYSIS AUTO W/O SCOPE: CPT

## 2020-08-29 PROCEDURE — 99284 EMERGENCY DEPT VISIT MOD MDM: CPT

## 2020-08-29 PROCEDURE — 93005 ELECTROCARDIOGRAM TRACING: CPT | Performed by: EMERGENCY MEDICINE

## 2020-08-29 PROCEDURE — 71045 X-RAY EXAM CHEST 1 VIEW: CPT

## 2020-08-29 PROCEDURE — 85025 COMPLETE CBC W/AUTO DIFF WBC: CPT

## 2020-08-29 RX ORDER — CETIRIZINE HYDROCHLORIDE 10 MG/1
10 TABLET ORAL PRN
COMMUNITY

## 2020-08-29 ASSESSMENT — FIBROSIS 4 INDEX: FIB4 SCORE: 0.99

## 2020-08-29 NOTE — ED NOTES
Med rec updated and complete  Allergies reviewed  Pt reports no prescription medications or vitamins   Pt reports no antibiotics in the last 2 weeks

## 2020-08-29 NOTE — ED NOTES
Patient updated on plan of care. Patient resting in bed. Patient occasionally self adjusts in bed. Bed is at lowest position and locked. Call light and preferred belongings in reach. Patient denies pain and any current needs. Will continue to monitor.

## 2020-08-29 NOTE — ED TRIAGE NOTES
Presents complaining of recurring right flank pain with intermittent nausea persisting for the past 6 months.  She reports fatigue, anorexia and weight loss.  Chief Complaint   Patient presents with   • Flank Pain   • Fatigue   • Weight Loss   /76   Pulse 84   Temp 36.7 °C (98 °F) (Temporal)   Resp 17   Ht 1.524 m (5')   Wt 45 kg (99 lb 3.3 oz)   SpO2 96%   BMI 19.38 kg/m²

## 2020-08-29 NOTE — ED NOTES
Patient verbalized understanding to plan of care and discharge information. Patient in stable condition. No signs of distress. Patient pain free. Patient ambulatory out of ED to personal vehicle with stable gait with friend.

## 2020-08-29 NOTE — ED PROVIDER NOTES
"ED Provider Note    CHIEF COMPLAINT  Chief Complaint   Patient presents with   • Flank Pain   • Fatigue   • Weight Loss       HPI  Aminah Maldonado is a 65 y.o. female here for evaluation of intermittent flank pain, general fatigue, and weight loss.  Patient states that she has had this for about 5 years.  Patient states that she has seen her doctors in the past, and it is \"just not getting any better.  Patient has no fever chills and no vomiting.  She states that she has no chest pain, and no shortness of breath.  She has no headache.  States this is been ongoing issue, and she really would like to be evaluated.      ROS  See HPI for further details, o/w negative.     PAST MEDICAL HISTORY   has a past medical history of Endometriosis.    SOCIAL HISTORY  Social History     Tobacco Use   • Smoking status: Never Smoker   • Smokeless tobacco: Never Used   Substance and Sexual Activity   • Alcohol use: No     Alcohol/week: 1.2 oz     Types: 2 Shots of liquor per week     Comment: Rarely   • Drug use: No   • Sexual activity: Never       Family History  No bleeding disorders    SURGICAL HISTORY   has a past surgical history that includes appendectomy; abdominal hysterectomy total; and tonsillectomy and adenoidectomy.    CURRENT MEDICATIONS  Home Medications     Reviewed by Joel Resendiz (Pharmacy Tech) on 08/29/20 at 1251  Med List Status: Complete   Medication Last Dose Status   cetirizine (ZYRTEC) 10 MG Tab 8/28/2020 Active                ALLERGIES  Allergies   Allergen Reactions   • Cefdinir Rash   • Contrast Media With Iodine [Iodine] Hives and Shortness of Breath   • Doxycycline Hyclate Rash       REVIEW OF SYSTEMS  See HPI for further details. Review of systems as above, otherwise all other systems are negative.     PHYSICAL EXAM  Constitutional: Well developed, well nourished. No acute distress.  HEENT: Normocephalic, atraumatic. Posterior pharynx clear and moist.  Eyes:  EOMI. Normal sclera.  Neck: " Supple, Full range of motion, nontender.  Chest/Pulmonary: clear to ausculation. Symmetrical expansion.   Cardio: Regular rate and rhythm with no murmur.   Abdomen: Soft, nontender. No peritoneal signs. No guarding. No palpable masses.  Back: No CVA tenderness, nontender midline, no step offs.  Musculoskeletal: No deformity, no edema, neurovascular intact.   Neuro: Clear speech, appropriate, cooperative, cranial nerves II-XII grossly intact.  Psych: Normal mood and affect      Results for orders placed or performed during the hospital encounter of 08/29/20   URINALYSIS,CULTURE IF INDICATED    Specimen: Urine   Result Value Ref Range    Color Straw     Character Clear     Specific Gravity <=1.005 <1.035    Ph 6.5 5.0 - 8.0    Glucose Negative Negative mg/dL    Ketones Negative Negative mg/dL    Protein Negative Negative mg/dL    Bilirubin Negative Negative    Nitrite Negative Negative    Leukocyte Esterase Negative Negative    Occult Blood Negative Negative    Micro Urine Req see below    CBC WITH DIFFERENTIAL   Result Value Ref Range    WBC 6.3 4.8 - 10.8 K/uL    RBC 3.89 (L) 4.20 - 5.40 M/uL    Hemoglobin 13.4 12.0 - 16.0 g/dL    Hematocrit 39.0 37.0 - 47.0 %    .3 (H) 81.4 - 97.8 fL    MCH 34.4 (H) 27.0 - 33.0 pg    MCHC 34.4 33.6 - 35.0 g/dL    RDW 41.8 35.9 - 50.0 fL    Platelet Count 347 164 - 446 K/uL    MPV 10.1 9.0 - 12.9 fL    Neutrophils-Polys 71.60 44.00 - 72.00 %    Lymphocytes 20.30 (L) 22.00 - 41.00 %    Monocytes 5.40 0.00 - 13.40 %    Eosinophils 1.60 0.00 - 6.90 %    Basophils 0.80 0.00 - 1.80 %    Immature Granulocytes 0.30 0.00 - 0.90 %    Nucleated RBC 0.00 /100 WBC    Neutrophils (Absolute) 4.48 2.00 - 7.15 K/uL    Lymphs (Absolute) 1.27 1.00 - 4.80 K/uL    Monos (Absolute) 0.34 0.00 - 0.85 K/uL    Eos (Absolute) 0.10 0.00 - 0.51 K/uL    Baso (Absolute) 0.05 0.00 - 0.12 K/uL    Immature Granulocytes (abs) 0.02 0.00 - 0.11 K/uL    NRBC (Absolute) 0.00 K/uL   COMP METABOLIC PANEL   Result  Value Ref Range    Sodium 140 135 - 145 mmol/L    Potassium 4.1 3.6 - 5.5 mmol/L    Chloride 103 96 - 112 mmol/L    Co2 26 20 - 33 mmol/L    Anion Gap 11.0 7.0 - 16.0    Glucose 100 (H) 65 - 99 mg/dL    Bun 10 8 - 22 mg/dL    Creatinine 0.66 0.50 - 1.40 mg/dL    Calcium 9.4 8.4 - 10.2 mg/dL    AST(SGOT) 16 12 - 45 U/L    ALT(SGPT) 10 2 - 50 U/L    Alkaline Phosphatase 159 (H) 30 - 99 U/L    Total Bilirubin 1.4 0.1 - 1.5 mg/dL    Albumin 4.4 3.2 - 4.9 g/dL    Total Protein 6.7 6.0 - 8.2 g/dL    Globulin 2.3 1.9 - 3.5 g/dL    A-G Ratio 1.9 g/dL   LIPASE   Result Value Ref Range    Lipase 34 7 - 58 U/L   TROPONIN   Result Value Ref Range    Troponin T <6 6 - 19 ng/L   ESTIMATED GFR   Result Value Ref Range    GFR If African American >60 >60 mL/min/1.73 m 2    GFR If Non African American >60 >60 mL/min/1.73 m 2   TROPONIN   Result Value Ref Range    Troponin T <6 6 - 19 ng/L   EKG   Result Value Ref Range    Report       [facility] Emergency Dept.    Test Date:  2020  Pt Name:    DEANN SINGH               Department: [facility]  MRN:        [id number]                      [facility]  Gender:     Female                       Technician: NICOLE  :        [id number]                   Requested By:JASON LEACH  Order #:    [id number]                    Reading MD:    Measurements  Intervals                                Axis  Rate:       73                           P:          79  WV:         145                          QRS:        57  QRSD:       86                           T:          47  QT:         376  QTc:        415    Interpretive Statements  Sinus rhythm  RSR' in V1 or V2, probably normal variant  Minimal ST elevation, anterior leads  Compared to ECG 2017 16:10:13  ST (T wave) deviation now present       DX-CHEST-PORTABLE (1 VIEW)   Final Result      1.  There is no acute cardiopulmonary process.        Ekg;  nsr 73.  No st elevation, no st depression, qtc 415.         PROCEDURES     MEDICAL RECORD  I have reviewed patient's medical record and pertinent results are listed.    COURSE & MEDICAL DECISION MAKING  I have reviewed any medical record information, laboratory studies and radiographic results as noted above.    4:47 PM  The pt has a negative cardiac work up, two negative trops, and unchanged ekg. She states her symptoms have been ongoing for 'four year' and she needs follow up with gi.  She will return here for any further issues or concerns.     If you have had any blood pressure issues while here in the emergency department, please see your doctor for a further evaluation or work up.    Differential diagnoses include but not limited to: mi, pe, pneumonia, uti,     This patient presents with fatigue .  At this time, I have counseled the patient/family regarding their medications, pain control, and follow up.  They will continue their medications, if any, as prescribed.  They will return immediately for any worsening symptoms and/or any other medical concerns.  They will see their doctor, or contact the doctor provided, in 1-2 days for follow up.       FINAL IMPRESSION  1. Fatigue, unspecified type             Electronically signed by: Blake Henao D.O., 8/29/2020 1:44 PM

## 2020-10-24 ENCOUNTER — HOSPITAL ENCOUNTER (OUTPATIENT)
Dept: LAB | Facility: MEDICAL CENTER | Age: 65
End: 2020-10-24
Attending: NURSE PRACTITIONER
Payer: COMMERCIAL

## 2020-10-24 ENCOUNTER — HOSPITAL ENCOUNTER (OUTPATIENT)
Dept: LAB | Facility: MEDICAL CENTER | Age: 65
End: 2020-10-24
Attending: INTERNAL MEDICINE
Payer: COMMERCIAL

## 2020-10-24 LAB
25(OH)D3 SERPL-MCNC: 45 NG/ML (ref 30–100)
25(OH)D3 SERPL-MCNC: 45 NG/ML (ref 30–100)
ALBUMIN SERPL BCP-MCNC: 4.7 G/DL (ref 3.2–4.9)
ALBUMIN SERPL BCP-MCNC: 4.8 G/DL (ref 3.2–4.9)
ALBUMIN/GLOB SERPL: 2 G/DL
ALBUMIN/GLOB SERPL: 2.1 G/DL
ALP SERPL-CCNC: 182 U/L (ref 30–99)
ALP SERPL-CCNC: 186 U/L (ref 30–99)
ALT SERPL-CCNC: 11 U/L (ref 2–50)
ALT SERPL-CCNC: 12 U/L (ref 2–50)
ANION GAP SERPL CALC-SCNC: 9 MMOL/L (ref 7–16)
ANION GAP SERPL CALC-SCNC: 9 MMOL/L (ref 7–16)
APPEARANCE UR: CLEAR
AST SERPL-CCNC: 16 U/L (ref 12–45)
AST SERPL-CCNC: 17 U/L (ref 12–45)
BACTERIA #/AREA URNS HPF: NEGATIVE /HPF
BASOPHILS # BLD AUTO: 1.4 % (ref 0–1.8)
BASOPHILS # BLD: 0.06 K/UL (ref 0–0.12)
BILIRUB SERPL-MCNC: 1.2 MG/DL (ref 0.1–1.5)
BILIRUB SERPL-MCNC: 1.2 MG/DL (ref 0.1–1.5)
BILIRUB UR QL STRIP.AUTO: NEGATIVE
BUN SERPL-MCNC: 5 MG/DL (ref 8–22)
BUN SERPL-MCNC: 5 MG/DL (ref 8–22)
CALCIUM SERPL-MCNC: 9.7 MG/DL (ref 8.5–10.5)
CALCIUM SERPL-MCNC: 9.8 MG/DL (ref 8.5–10.5)
CHLORIDE SERPL-SCNC: 101 MMOL/L (ref 96–112)
CHLORIDE SERPL-SCNC: 102 MMOL/L (ref 96–112)
CO2 SERPL-SCNC: 28 MMOL/L (ref 20–33)
CO2 SERPL-SCNC: 29 MMOL/L (ref 20–33)
COLOR UR: YELLOW
CORTIS SERPL-MCNC: 9.8 UG/DL (ref 0–23)
CREAT SERPL-MCNC: 0.54 MG/DL (ref 0.5–1.4)
CREAT SERPL-MCNC: 0.57 MG/DL (ref 0.5–1.4)
EOSINOPHIL # BLD AUTO: 0.11 K/UL (ref 0–0.51)
EOSINOPHIL NFR BLD: 2.6 % (ref 0–6.9)
EPI CELLS #/AREA URNS HPF: NORMAL /HPF
ERYTHROCYTE [DISTWIDTH] IN BLOOD BY AUTOMATED COUNT: 44.5 FL (ref 35.9–50)
FASTING STATUS PATIENT QL REPORTED: NORMAL
GLOBULIN SER CALC-MCNC: 2.3 G/DL (ref 1.9–3.5)
GLOBULIN SER CALC-MCNC: 2.4 G/DL (ref 1.9–3.5)
GLUCOSE SERPL-MCNC: 84 MG/DL (ref 65–99)
GLUCOSE SERPL-MCNC: 92 MG/DL (ref 65–99)
GLUCOSE UR STRIP.AUTO-MCNC: NEGATIVE MG/DL
HCT VFR BLD AUTO: 42.6 % (ref 37–47)
HGB BLD-MCNC: 14 G/DL (ref 12–16)
IMM GRANULOCYTES # BLD AUTO: 0.01 K/UL (ref 0–0.11)
IMM GRANULOCYTES NFR BLD AUTO: 0.2 % (ref 0–0.9)
KETONES UR STRIP.AUTO-MCNC: NEGATIVE MG/DL
LEUKOCYTE ESTERASE UR QL STRIP.AUTO: ABNORMAL
LYMPHOCYTES # BLD AUTO: 1.17 K/UL (ref 1–4.8)
LYMPHOCYTES NFR BLD: 27.2 % (ref 22–41)
MCH RBC QN AUTO: 34.6 PG (ref 27–33)
MCHC RBC AUTO-ENTMCNC: 32.9 G/DL (ref 33.6–35)
MCV RBC AUTO: 105.2 FL (ref 81.4–97.8)
MICRO URNS: ABNORMAL
MONOCYTES # BLD AUTO: 0.28 K/UL (ref 0–0.85)
MONOCYTES NFR BLD AUTO: 6.5 % (ref 0–13.4)
NEUTROPHILS # BLD AUTO: 2.67 K/UL (ref 2–7.15)
NEUTROPHILS NFR BLD: 62.1 % (ref 44–72)
NITRITE UR QL STRIP.AUTO: NEGATIVE
NRBC # BLD AUTO: 0 K/UL
NRBC BLD-RTO: 0 /100 WBC
OSMOLALITY UR: 185 MOSM/KG H2O (ref 300–900)
PH UR STRIP.AUTO: 7.5 [PH] (ref 5–8)
PLATELET # BLD AUTO: 361 K/UL (ref 164–446)
PMV BLD AUTO: 11.3 FL (ref 9–12.9)
POTASSIUM SERPL-SCNC: 3.8 MMOL/L (ref 3.6–5.5)
POTASSIUM SERPL-SCNC: 3.9 MMOL/L (ref 3.6–5.5)
PROT SERPL-MCNC: 7.1 G/DL (ref 6–8.2)
PROT SERPL-MCNC: 7.1 G/DL (ref 6–8.2)
PROT UR QL STRIP: NEGATIVE MG/DL
PTH-INTACT SERPL-MCNC: 40.2 PG/ML (ref 14–72)
RBC # BLD AUTO: 4.05 M/UL (ref 4.2–5.4)
RBC # URNS HPF: NORMAL /HPF
RBC UR QL AUTO: NEGATIVE
SODIUM SERPL-SCNC: 139 MMOL/L (ref 135–145)
SODIUM SERPL-SCNC: 139 MMOL/L (ref 135–145)
SODIUM UR-SCNC: 41 MMOL/L
SP GR UR STRIP.AUTO: 1.01
T4 FREE SERPL-MCNC: 1.23 NG/DL (ref 0.93–1.7)
T4 FREE SERPL-MCNC: 1.24 NG/DL (ref 0.93–1.7)
THYROPEROXIDASE AB SERPL-ACNC: <9 IU/ML (ref 0–9)
TSH SERPL DL<=0.005 MIU/L-ACNC: 1.51 UIU/ML (ref 0.38–5.33)
TSH SERPL DL<=0.005 MIU/L-ACNC: 1.51 UIU/ML (ref 0.38–5.33)
UROBILINOGEN UR STRIP.AUTO-MCNC: 0.2 MG/DL
VIT B12 SERPL-MCNC: 678 PG/ML (ref 211–911)
WBC # BLD AUTO: 4.3 K/UL (ref 4.8–10.8)
WBC #/AREA URNS HPF: NORMAL /HPF

## 2020-10-24 PROCEDURE — 85025 COMPLETE CBC W/AUTO DIFF WBC: CPT

## 2020-10-24 PROCEDURE — 82024 ASSAY OF ACTH: CPT

## 2020-10-24 PROCEDURE — 82306 VITAMIN D 25 HYDROXY: CPT | Mod: 91

## 2020-10-24 PROCEDURE — 84443 ASSAY THYROID STIM HORMONE: CPT

## 2020-10-24 PROCEDURE — 83970 ASSAY OF PARATHORMONE: CPT

## 2020-10-24 PROCEDURE — 84439 ASSAY OF FREE THYROXINE: CPT

## 2020-10-24 PROCEDURE — 82306 VITAMIN D 25 HYDROXY: CPT

## 2020-10-24 PROCEDURE — 84305 ASSAY OF SOMATOMEDIN: CPT

## 2020-10-24 PROCEDURE — 84300 ASSAY OF URINE SODIUM: CPT

## 2020-10-24 PROCEDURE — 82533 TOTAL CORTISOL: CPT

## 2020-10-24 PROCEDURE — 81001 URINALYSIS AUTO W/SCOPE: CPT

## 2020-10-24 PROCEDURE — 80053 COMPREHEN METABOLIC PANEL: CPT | Mod: 91

## 2020-10-24 PROCEDURE — 84443 ASSAY THYROID STIM HORMONE: CPT | Mod: 91

## 2020-10-24 PROCEDURE — 86376 MICROSOMAL ANTIBODY EACH: CPT

## 2020-10-24 PROCEDURE — 83935 ASSAY OF URINE OSMOLALITY: CPT

## 2020-10-24 PROCEDURE — 36415 COLL VENOUS BLD VENIPUNCTURE: CPT

## 2020-10-24 PROCEDURE — 80053 COMPREHEN METABOLIC PANEL: CPT

## 2020-10-24 PROCEDURE — 82607 VITAMIN B-12: CPT

## 2020-10-24 PROCEDURE — 84439 ASSAY OF FREE THYROXINE: CPT | Mod: 91

## 2020-10-24 PROCEDURE — 83930 ASSAY OF BLOOD OSMOLALITY: CPT

## 2020-10-26 LAB — OSMOLALITY SERPL: 293 MOSM/KG H2O (ref 278–298)

## 2020-10-27 LAB — ACTH PLAS-MCNC: 16.2 PG/ML (ref 7.2–63.3)

## 2020-10-28 LAB
IGF-I SERPL-MCNC: 118 NG/ML (ref 38–244)
IGF-I Z-SCORE SERPL: 0.1

## 2020-11-05 ENCOUNTER — HOSPITAL ENCOUNTER (OUTPATIENT)
Dept: LAB | Facility: MEDICAL CENTER | Age: 65
End: 2020-11-05
Attending: NURSE PRACTITIONER
Payer: COMMERCIAL

## 2020-11-05 ENCOUNTER — HOSPITAL ENCOUNTER (OUTPATIENT)
Dept: LAB | Facility: MEDICAL CENTER | Age: 65
End: 2020-11-05
Attending: INTERNAL MEDICINE
Payer: COMMERCIAL

## 2020-11-05 PROCEDURE — 84075 ASSAY ALKALINE PHOSPHATASE: CPT

## 2020-11-05 PROCEDURE — 84080 ASSAY ALKALINE PHOSPHATASES: CPT

## 2020-11-05 PROCEDURE — 84080 ASSAY ALKALINE PHOSPHATASES: CPT | Mod: 91

## 2020-11-05 PROCEDURE — 36415 COLL VENOUS BLD VENIPUNCTURE: CPT

## 2020-11-07 LAB — ALP BONE SERPL-MCNC: 38.6 UG/L

## 2020-11-09 LAB
ALP BONE SERPL-CCNC: 94 U/L (ref 0–55)
ALP ISOS SERPL HS-CCNC: 0 U/L
ALP LIVER SERPL-CCNC: 84 U/L (ref 0–94)
ALP SERPL-CCNC: 178 U/L (ref 40–120)

## 2020-11-21 ENCOUNTER — HOSPITAL ENCOUNTER (OUTPATIENT)
Dept: LAB | Facility: MEDICAL CENTER | Age: 65
End: 2020-11-21
Attending: INTERNAL MEDICINE
Payer: COMMERCIAL

## 2020-11-21 LAB
25(OH)D3 SERPL-MCNC: 40 NG/ML (ref 30–100)
ANION GAP SERPL CALC-SCNC: 7 MMOL/L (ref 7–16)
CHLORIDE SERPL-SCNC: 105 MMOL/L (ref 96–112)
CO2 SERPL-SCNC: 30 MMOL/L (ref 20–33)
POTASSIUM SERPL-SCNC: 4.4 MMOL/L (ref 3.6–5.5)
SODIUM SERPL-SCNC: 142 MMOL/L (ref 135–145)

## 2020-11-21 PROCEDURE — 36415 COLL VENOUS BLD VENIPUNCTURE: CPT

## 2020-11-21 PROCEDURE — 82306 VITAMIN D 25 HYDROXY: CPT

## 2020-11-21 PROCEDURE — 84588 ASSAY OF VASOPRESSIN: CPT

## 2020-11-21 PROCEDURE — 80051 ELECTROLYTE PANEL: CPT

## 2020-11-27 LAB — MISCELLANEOUS LAB RESULT MISCLAB: NORMAL

## 2021-03-06 ENCOUNTER — HOSPITAL ENCOUNTER (OUTPATIENT)
Dept: LAB | Facility: MEDICAL CENTER | Age: 66
End: 2021-03-06
Attending: INTERNAL MEDICINE
Payer: COMMERCIAL

## 2021-03-06 LAB
CHLORIDE UR-SCNC: 30 MMOL/L
OSMOLALITY UR: 232 MOSM/KG H2O (ref 300–900)
POTASSIUM UR-SCNC: 15 MMOL/L
SODIUM UR-SCNC: 34 MMOL/L

## 2021-03-06 PROCEDURE — 84300 ASSAY OF URINE SODIUM: CPT

## 2021-03-06 PROCEDURE — 83935 ASSAY OF URINE OSMOLALITY: CPT

## 2021-03-06 PROCEDURE — 84133 ASSAY OF URINE POTASSIUM: CPT

## 2021-03-06 PROCEDURE — 82436 ASSAY OF URINE CHLORIDE: CPT

## 2021-04-03 ENCOUNTER — HOSPITAL ENCOUNTER (OUTPATIENT)
Dept: LAB | Facility: MEDICAL CENTER | Age: 66
End: 2021-04-03
Attending: NURSE PRACTITIONER
Payer: COMMERCIAL

## 2021-04-03 LAB
ALBUMIN SERPL BCP-MCNC: 4.8 G/DL (ref 3.2–4.9)
ALBUMIN/GLOB SERPL: 1.8 G/DL
ALP SERPL-CCNC: 181 U/L (ref 30–99)
ALT SERPL-CCNC: 11 U/L (ref 2–50)
ANION GAP SERPL CALC-SCNC: 10 MMOL/L (ref 7–16)
APPEARANCE UR: CLEAR
AST SERPL-CCNC: 16 U/L (ref 12–45)
BASOPHILS # BLD AUTO: 1.6 % (ref 0–1.8)
BASOPHILS # BLD: 0.07 K/UL (ref 0–0.12)
BILIRUB SERPL-MCNC: 1.1 MG/DL (ref 0.1–1.5)
BILIRUB UR QL STRIP.AUTO: NEGATIVE
BUN SERPL-MCNC: 6 MG/DL (ref 8–22)
CALCIUM SERPL-MCNC: 9.9 MG/DL (ref 8.5–10.5)
CHLORIDE SERPL-SCNC: 102 MMOL/L (ref 96–112)
CO2 SERPL-SCNC: 29 MMOL/L (ref 20–33)
COLOR UR: YELLOW
CREAT SERPL-MCNC: 0.76 MG/DL (ref 0.5–1.4)
CREAT UR-MCNC: 31.85 MG/DL
EOSINOPHIL # BLD AUTO: 0.08 K/UL (ref 0–0.51)
EOSINOPHIL NFR BLD: 1.9 % (ref 0–6.9)
ERYTHROCYTE [DISTWIDTH] IN BLOOD BY AUTOMATED COUNT: 44.9 FL (ref 35.9–50)
EST. AVERAGE GLUCOSE BLD GHB EST-MCNC: 120 MG/DL
FERRITIN SERPL-MCNC: 138 NG/ML (ref 10–291)
GLOBULIN SER CALC-MCNC: 2.7 G/DL (ref 1.9–3.5)
GLUCOSE SERPL-MCNC: 80 MG/DL (ref 65–99)
GLUCOSE UR STRIP.AUTO-MCNC: NEGATIVE MG/DL
HBA1C MFR BLD: 5.8 % (ref 4–5.6)
HCT VFR BLD AUTO: 44.8 % (ref 37–47)
HGB BLD-MCNC: 14.7 G/DL (ref 12–16)
IMM GRANULOCYTES # BLD AUTO: 0.01 K/UL (ref 0–0.11)
IMM GRANULOCYTES NFR BLD AUTO: 0.2 % (ref 0–0.9)
IRON SATN MFR SERPL: 33 % (ref 15–55)
IRON SERPL-MCNC: 108 UG/DL (ref 40–170)
KETONES UR STRIP.AUTO-MCNC: NEGATIVE MG/DL
LEUKOCYTE ESTERASE UR QL STRIP.AUTO: NEGATIVE
LYMPHOCYTES # BLD AUTO: 1.21 K/UL (ref 1–4.8)
LYMPHOCYTES NFR BLD: 28.1 % (ref 22–41)
MAGNESIUM SERPL-MCNC: 2.1 MG/DL (ref 1.5–2.5)
MCH RBC QN AUTO: 34.5 PG (ref 27–33)
MCHC RBC AUTO-ENTMCNC: 32.8 G/DL (ref 33.6–35)
MCV RBC AUTO: 105.2 FL (ref 81.4–97.8)
MICRO URNS: NORMAL
MONOCYTES # BLD AUTO: 0.35 K/UL (ref 0–0.85)
MONOCYTES NFR BLD AUTO: 8.1 % (ref 0–13.4)
NEUTROPHILS # BLD AUTO: 2.58 K/UL (ref 2–7.15)
NEUTROPHILS NFR BLD: 60.1 % (ref 44–72)
NITRITE UR QL STRIP.AUTO: NEGATIVE
NRBC # BLD AUTO: 0 K/UL
NRBC BLD-RTO: 0 /100 WBC
PH UR STRIP.AUTO: 7.5 [PH] (ref 5–8)
PHOSPHATE SERPL-MCNC: 4.2 MG/DL (ref 2.5–4.5)
PLATELET # BLD AUTO: 389 K/UL (ref 164–446)
PMV BLD AUTO: 11.2 FL (ref 9–12.9)
POTASSIUM SERPL-SCNC: 4.2 MMOL/L (ref 3.6–5.5)
PROT SERPL-MCNC: 7.5 G/DL (ref 6–8.2)
PROT UR QL STRIP: NEGATIVE MG/DL
PROT UR-MCNC: <4 MG/DL (ref 0–15)
PROT/CREAT UR: NORMAL MG/G (ref 10–107)
PTH-INTACT SERPL-MCNC: 25.6 PG/ML (ref 14–72)
RBC # BLD AUTO: 4.26 M/UL (ref 4.2–5.4)
RBC UR QL AUTO: NEGATIVE
SODIUM SERPL-SCNC: 141 MMOL/L (ref 135–145)
SP GR UR STRIP.AUTO: 1.01
TIBC SERPL-MCNC: 329 UG/DL (ref 250–450)
UIBC SERPL-MCNC: 221 UG/DL (ref 110–370)
URATE SERPL-MCNC: 4.2 MG/DL (ref 1.9–8.2)
UROBILINOGEN UR STRIP.AUTO-MCNC: 0.2 MG/DL
WBC # BLD AUTO: 4.3 K/UL (ref 4.8–10.8)

## 2021-04-03 PROCEDURE — 82306 VITAMIN D 25 HYDROXY: CPT

## 2021-04-03 PROCEDURE — 84100 ASSAY OF PHOSPHORUS: CPT

## 2021-04-03 PROCEDURE — 83036 HEMOGLOBIN GLYCOSYLATED A1C: CPT

## 2021-04-03 PROCEDURE — 84550 ASSAY OF BLOOD/URIC ACID: CPT

## 2021-04-03 PROCEDURE — 83735 ASSAY OF MAGNESIUM: CPT

## 2021-04-03 PROCEDURE — 81003 URINALYSIS AUTO W/O SCOPE: CPT

## 2021-04-03 PROCEDURE — 36415 COLL VENOUS BLD VENIPUNCTURE: CPT

## 2021-04-03 PROCEDURE — 80053 COMPREHEN METABOLIC PANEL: CPT

## 2021-04-03 PROCEDURE — 82570 ASSAY OF URINE CREATININE: CPT

## 2021-04-03 PROCEDURE — 82728 ASSAY OF FERRITIN: CPT

## 2021-04-03 PROCEDURE — 83550 IRON BINDING TEST: CPT

## 2021-04-03 PROCEDURE — 85025 COMPLETE CBC W/AUTO DIFF WBC: CPT

## 2021-04-03 PROCEDURE — 83540 ASSAY OF IRON: CPT

## 2021-04-03 PROCEDURE — 83970 ASSAY OF PARATHORMONE: CPT

## 2021-04-03 PROCEDURE — 84156 ASSAY OF PROTEIN URINE: CPT

## 2021-04-06 LAB — 25(OH)D3 SERPL-MCNC: 30 NG/ML (ref 30–80)

## 2021-04-12 ENCOUNTER — OFFICE VISIT (OUTPATIENT)
Dept: URGENT CARE | Facility: PHYSICIAN GROUP | Age: 66
End: 2021-04-12
Payer: COMMERCIAL

## 2021-04-12 ENCOUNTER — HOSPITAL ENCOUNTER (OUTPATIENT)
Facility: MEDICAL CENTER | Age: 66
End: 2021-04-12
Attending: PHYSICIAN ASSISTANT
Payer: COMMERCIAL

## 2021-04-12 VITALS
OXYGEN SATURATION: 97 % | TEMPERATURE: 99 F | WEIGHT: 100 LBS | HEIGHT: 60 IN | DIASTOLIC BLOOD PRESSURE: 80 MMHG | RESPIRATION RATE: 14 BRPM | HEART RATE: 104 BPM | SYSTOLIC BLOOD PRESSURE: 122 MMHG | BODY MASS INDEX: 19.63 KG/M2

## 2021-04-12 DIAGNOSIS — R19.7 VOMITING AND DIARRHEA: ICD-10-CM

## 2021-04-12 DIAGNOSIS — R11.10 VOMITING AND DIARRHEA: ICD-10-CM

## 2021-04-12 PROCEDURE — U0005 INFEC AGEN DETEC AMPLI PROBE: HCPCS

## 2021-04-12 PROCEDURE — 99214 OFFICE O/P EST MOD 30 MIN: CPT | Performed by: PHYSICIAN ASSISTANT

## 2021-04-12 PROCEDURE — U0003 INFECTIOUS AGENT DETECTION BY NUCLEIC ACID (DNA OR RNA); SEVERE ACUTE RESPIRATORY SYNDROME CORONAVIRUS 2 (SARS-COV-2) (CORONAVIRUS DISEASE [COVID-19]), AMPLIFIED PROBE TECHNIQUE, MAKING USE OF HIGH THROUGHPUT TECHNOLOGIES AS DESCRIBED BY CMS-2020-01-R: HCPCS

## 2021-04-12 ASSESSMENT — ENCOUNTER SYMPTOMS
BLOOD IN STOOL: 0
SHORTNESS OF BREATH: 0
NUMBER OF EPISODES OF EMESIS TODAY: 1
HEARTBURN: 0
ABDOMINAL PAIN: 1
DIARRHEA: 0
CHILLS: 0
COUGH: 0
PALPITATIONS: 0
NAUSEA: 0
CONSTIPATION: 0
DIZZINESS: 0
CHANGE IN BOWEL HABIT: 1
WEIGHT LOSS: 0
DIAPHORESIS: 0
VOMITING: 0
HEADACHES: 0
WEAKNESS: 0
FEVER: 0

## 2021-04-12 ASSESSMENT — FIBROSIS 4 INDEX: FIB4 SCORE: 0.78

## 2021-04-12 NOTE — PROGRESS NOTES
Subjective:   Aminah Maldonado is a 65 y.o. female who presents for Emesis ([id number] )      Emesis  This is a new problem. The current episode started yesterday. The problem occurs intermittently. Associated symptoms include abdominal pain and a change in bowel habit. Pertinent negatives include no chest pain, chills, coughing, diaphoresis, fever, headaches, nausea, rash, vomiting or weakness. Nothing aggravates the symptoms. She has tried nothing for the symptoms.       Review of Systems   Constitutional: Positive for malaise/fatigue. Negative for chills, diaphoresis, fever and weight loss.   Respiratory: Negative for cough and shortness of breath.    Cardiovascular: Negative for chest pain and palpitations.   Gastrointestinal: Positive for abdominal pain and change in bowel habit. Negative for blood in stool, constipation, diarrhea, heartburn, melena, nausea and vomiting.   Skin: Negative for itching and rash.   Neurological: Negative for dizziness, weakness and headaches.   All other systems reviewed and are negative.      Medications:    • cetirizine Tabs    Allergies: Cefdinir, Contrast media with iodine [iodine], and Doxycycline hyclate    Problem List: Aminah Maldonado has Acute non-recurrent maxillary sinusitis; Fatigue; Uncontrolled daytime somnolence; Elevated serum alkaline phosphatase level; Macrocytosis without anemia; Right upper quadrant pain; Cough; Dizziness; SOB (shortness of breath); Radicular pain of thoracic region; Idiopathic hypotension; DDD (degenerative disc disease), thoracic; Angioedema; Allergic reaction; Pain; and Polyuria on their problem list.    Surgical History:  Past Surgical History:   Procedure Laterality Date   • ABDOMINAL HYSTERECTOMY TOTAL      and BSO for endometriosis   • APPENDECTOMY     • TONSILLECTOMY AND ADENOIDECTOMY         Past Social Hx: Aminah Maldonado  reports that she has never smoked. She has never used smokeless tobacco. She reports that she does not  drink alcohol and does not use drugs.     Past Family Hx:  Aminah Maldonado family history includes Cancer (age of onset: 55) in her sister; Cancer (age of onset: 81) in her father; Diabetes in her brother and father; Heart Disease in her mother; Kidney Disease (age of onset: 49) in her brother; Lung Disease in her father and sister.     Problem list, medications, and allergies reviewed by myself today in Epic.     Objective:     Blood Pressure 122/80   Pulse (Abnormal) 104   Temperature 37.2 °C (99 °F) (Temporal)   Respiration 14   Height 1.524 m (5')   Weight 45.4 kg (100 lb) Comment: pt states  Oxygen Saturation 97%   Body Mass Index 19.53 kg/m²     Physical Exam  Vitals reviewed.   Constitutional:       General: She is not in acute distress.     Appearance: Normal appearance. She is well-developed. She is not ill-appearing, toxic-appearing or diaphoretic.   HENT:      Head: Normocephalic and atraumatic.      Right Ear: External ear normal.      Left Ear: External ear normal.      Nose: Nose normal.   Eyes:      General: Lids are normal.      Conjunctiva/sclera: Conjunctivae normal.   Cardiovascular:      Rate and Rhythm: Normal rate and regular rhythm.      Heart sounds: Normal heart sounds, S1 normal and S2 normal. No murmur. No friction rub. No gallop.    Pulmonary:      Effort: Pulmonary effort is normal. No respiratory distress.      Breath sounds: Normal breath sounds. No decreased breath sounds, wheezing or rales.   Chest:      Chest wall: No tenderness.   Abdominal:      General: Bowel sounds are normal. There is no distension or abdominal bruit.      Palpations: Abdomen is soft. Abdomen is not rigid. There is no shifting dullness, fluid wave, hepatomegaly, splenomegaly, mass or pulsatile mass.      Tenderness: There is abdominal tenderness. There is no right CVA tenderness, left CVA tenderness, guarding or rebound. Negative signs include Soto's sign, Rovsing's sign, McBurney's sign, psoas sign  and obturator sign.      Hernia: No hernia is present.      Comments: Abdomen: PTP in the epigastric region not out of proportion.  Soft and nondistended. Normal bowel sounds. No hepatosplenomegaly or masses, or hernias. No rebound or guarding.     Musculoskeletal:         General: No tenderness or deformity. Normal range of motion.      Cervical back: Full passive range of motion without pain, normal range of motion and neck supple.   Skin:     General: Skin is warm and dry.      Findings: No bruising, ecchymosis or erythema.   Neurological:      Mental Status: She is alert and oriented to person, place, and time.   Psychiatric:         Speech: Speech normal.         Behavior: Behavior normal.         Thought Content: Thought content normal.         Judgment: Judgment normal.         Assessment/Plan:     Medical Decision Making/Comments     Pt is a 65 yr old female who presents for evaluation of vomiting and diarrhea.  Pt has been having symptoms for 1 days with associated symptoms of malaise.  Pt is having 10 stools a day with a watery consistency.  Pt denies melanotic/blood/mucus in BM, sick contacts,  recent travel, camping, antibiotics/recent hospitalization.  Pt is tolerated PO liquids.  No hypotension or tachycardia of vital signs.  Patient appears well and non-toxic.  Skin turgor is normal with no delayed tenting.  Oral mucosa is wet.  Abdominal exam shows no focal pain or signs of peritonitis. Likely viral etiolgy.    Diagnosis differential includes but not limited to:    Common:  Gastroenteritis viral vs bacterial, cholecystitis, appendicitis.  Uncommon: IBD, obstructive/intussusception, ischemic colitis, c-diff, HUS.       Diagnosis and associated orders     1. Vomiting and diarrhea  SARS-CoV-2 PCR (24 hour In-House): Collect NP swab in VTM     -oral hydration includin/2 tsp salt + 6 tsp sugar per 1 L  -BRAT/bland diet as tolerated  -Imodium OTC as needed           Differential diagnosis, natural  history, supportive care, and indications for immediate follow-up discussed.    Advised the patient to follow-up with the primary care physician for recheck, reevaluation, and consideration of further management.    Please note that this dictation was created using voice recognition software. I have made a reasonable attempt to correct obvious errors, but I expect that there are errors of grammar and possibly content that I did not discover before finalizing the note.

## 2021-04-13 LAB
COVID ORDER STATUS COVID19: NORMAL
SARS-COV-2 RNA RESP QL NAA+PROBE: NOTDETECTED
SPECIMEN SOURCE: NORMAL

## 2021-05-28 ENCOUNTER — HOSPITAL ENCOUNTER (OUTPATIENT)
Dept: LAB | Facility: MEDICAL CENTER | Age: 66
End: 2021-05-28
Attending: INTERNAL MEDICINE
Payer: COMMERCIAL

## 2021-05-28 LAB
ALBUMIN SERPL BCP-MCNC: 4.8 G/DL (ref 3.2–4.9)
ALBUMIN/GLOB SERPL: 2 G/DL
ALP SERPL-CCNC: 183 U/L (ref 30–99)
ALT SERPL-CCNC: 13 U/L (ref 2–50)
ANION GAP SERPL CALC-SCNC: 11 MMOL/L (ref 7–16)
AST SERPL-CCNC: 21 U/L (ref 12–45)
BASOPHILS # BLD AUTO: 2.1 % (ref 0–1.8)
BASOPHILS # BLD: 0.08 K/UL (ref 0–0.12)
BILIRUB SERPL-MCNC: 1.6 MG/DL (ref 0.1–1.5)
BUN SERPL-MCNC: 7 MG/DL (ref 8–22)
CALCIUM SERPL-MCNC: 9.4 MG/DL (ref 8.5–10.5)
CHLORIDE SERPL-SCNC: 105 MMOL/L (ref 96–112)
CO2 SERPL-SCNC: 27 MMOL/L (ref 20–33)
CREAT SERPL-MCNC: 0.57 MG/DL (ref 0.5–1.4)
CRP SERPL HS-MCNC: <0.3 MG/DL (ref 0–0.75)
EOSINOPHIL # BLD AUTO: 0.09 K/UL (ref 0–0.51)
EOSINOPHIL NFR BLD: 2.3 % (ref 0–6.9)
ERYTHROCYTE [DISTWIDTH] IN BLOOD BY AUTOMATED COUNT: 45.3 FL (ref 35.9–50)
ERYTHROCYTE [SEDIMENTATION RATE] IN BLOOD BY WESTERGREN METHOD: 6 MM/HOUR (ref 0–25)
FASTING STATUS PATIENT QL REPORTED: NORMAL
GLOBULIN SER CALC-MCNC: 2.4 G/DL (ref 1.9–3.5)
GLUCOSE SERPL-MCNC: 92 MG/DL (ref 65–99)
HCT VFR BLD AUTO: 43 % (ref 37–47)
HGB BLD-MCNC: 14.2 G/DL (ref 12–16)
IMM GRANULOCYTES # BLD AUTO: 0.01 K/UL (ref 0–0.11)
IMM GRANULOCYTES NFR BLD AUTO: 0.3 % (ref 0–0.9)
LYMPHOCYTES # BLD AUTO: 1.08 K/UL (ref 1–4.8)
LYMPHOCYTES NFR BLD: 28.1 % (ref 22–41)
MCH RBC QN AUTO: 34.5 PG (ref 27–33)
MCHC RBC AUTO-ENTMCNC: 33 G/DL (ref 33.6–35)
MCV RBC AUTO: 104.4 FL (ref 81.4–97.8)
MONOCYTES # BLD AUTO: 0.27 K/UL (ref 0–0.85)
MONOCYTES NFR BLD AUTO: 7 % (ref 0–13.4)
NEUTROPHILS # BLD AUTO: 2.31 K/UL (ref 2–7.15)
NEUTROPHILS NFR BLD: 60.2 % (ref 44–72)
NRBC # BLD AUTO: 0 K/UL
NRBC BLD-RTO: 0 /100 WBC
PLATELET # BLD AUTO: 368 K/UL (ref 164–446)
PMV BLD AUTO: 10.8 FL (ref 9–12.9)
POTASSIUM SERPL-SCNC: 3.9 MMOL/L (ref 3.6–5.5)
PROT SERPL-MCNC: 7.2 G/DL (ref 6–8.2)
RBC # BLD AUTO: 4.12 M/UL (ref 4.2–5.4)
SODIUM SERPL-SCNC: 143 MMOL/L (ref 135–145)
T4 FREE SERPL-MCNC: 1.31 NG/DL (ref 0.93–1.7)
TSH SERPL DL<=0.005 MIU/L-ACNC: 1.66 UIU/ML (ref 0.38–5.33)
WBC # BLD AUTO: 3.8 K/UL (ref 4.8–10.8)

## 2021-05-28 PROCEDURE — 85025 COMPLETE CBC W/AUTO DIFF WBC: CPT

## 2021-05-28 PROCEDURE — 82785 ASSAY OF IGE: CPT

## 2021-05-28 PROCEDURE — 88184 FLOWCYTOMETRY/ TC 1 MARKER: CPT

## 2021-05-28 PROCEDURE — 84443 ASSAY THYROID STIM HORMONE: CPT

## 2021-05-28 PROCEDURE — 36415 COLL VENOUS BLD VENIPUNCTURE: CPT

## 2021-05-28 PROCEDURE — 86376 MICROSOMAL ANTIBODY EACH: CPT

## 2021-05-28 PROCEDURE — 83520 IMMUNOASSAY QUANT NOS NONAB: CPT

## 2021-05-28 PROCEDURE — 84150 ASSAY OF PROSTAGLANDIN: CPT

## 2021-05-28 PROCEDURE — 82542 COL CHROMOTOGRAPHY QUAL/QUAN: CPT | Mod: 91

## 2021-05-28 PROCEDURE — 86140 C-REACTIVE PROTEIN: CPT

## 2021-05-28 PROCEDURE — 86800 THYROGLOBULIN ANTIBODY: CPT

## 2021-05-28 PROCEDURE — 85652 RBC SED RATE AUTOMATED: CPT

## 2021-05-28 PROCEDURE — 83088 ASSAY OF HISTAMINE: CPT

## 2021-05-28 PROCEDURE — 80053 COMPREHEN METABOLIC PANEL: CPT

## 2021-05-28 PROCEDURE — 84439 ASSAY OF FREE THYROXINE: CPT

## 2021-05-31 LAB
THYROGLOB AB SERPL-ACNC: <0.9 IU/ML (ref 0–4)
THYROPEROXIDASE AB SERPL-ACNC: 1.6 IU/ML (ref 0–9)

## 2021-06-01 LAB — TRYPTASE SERPL-MCNC: 3.9 UG/L

## 2021-06-02 LAB
HISTAMINE SERPL-SCNC: 8 NMOL/L (ref 0–8)
IGE SERPL-ACNC: 39 KU/L
URTIIND BASO ACT Q4770: 0 %

## 2021-06-04 LAB — TEST NAME 95000: NORMAL

## 2021-06-05 ENCOUNTER — HOSPITAL ENCOUNTER (OUTPATIENT)
Dept: LAB | Facility: MEDICAL CENTER | Age: 66
End: 2021-06-05
Attending: FAMILY MEDICINE
Payer: COMMERCIAL

## 2021-06-05 LAB
ALBUMIN SERPL BCP-MCNC: 4.8 G/DL (ref 3.2–4.9)
ALBUMIN/GLOB SERPL: 1.9 G/DL
ALP SERPL-CCNC: 172 U/L (ref 30–99)
ALT SERPL-CCNC: 11 U/L (ref 2–50)
ANION GAP SERPL CALC-SCNC: 9 MMOL/L (ref 7–16)
AST SERPL-CCNC: 19 U/L (ref 12–45)
BASOPHILS # BLD AUTO: 1.8 % (ref 0–1.8)
BASOPHILS # BLD: 0.06 K/UL (ref 0–0.12)
BILIRUB SERPL-MCNC: 1.3 MG/DL (ref 0.1–1.5)
BUN SERPL-MCNC: 6 MG/DL (ref 8–22)
CALCIUM SERPL-MCNC: 9.7 MG/DL (ref 8.5–10.5)
CHLORIDE SERPL-SCNC: 102 MMOL/L (ref 96–112)
CO2 SERPL-SCNC: 28 MMOL/L (ref 20–33)
CREAT SERPL-MCNC: 0.52 MG/DL (ref 0.5–1.4)
EOSINOPHIL # BLD AUTO: 0.1 K/UL (ref 0–0.51)
EOSINOPHIL NFR BLD: 3.1 % (ref 0–6.9)
ERYTHROCYTE [DISTWIDTH] IN BLOOD BY AUTOMATED COUNT: 44.5 FL (ref 35.9–50)
EST. AVERAGE GLUCOSE BLD GHB EST-MCNC: 100 MG/DL
FASTING STATUS PATIENT QL REPORTED: NORMAL
FERRITIN SERPL-MCNC: 135 NG/ML (ref 10–291)
GLOBULIN SER CALC-MCNC: 2.5 G/DL (ref 1.9–3.5)
GLUCOSE SERPL-MCNC: 96 MG/DL (ref 65–99)
HBA1C MFR BLD: 5.1 % (ref 4–5.6)
HCT VFR BLD AUTO: 43.7 % (ref 37–47)
HCV AB SER QL: NORMAL
HGB BLD-MCNC: 14.3 G/DL (ref 12–16)
HIV 1+2 AB+HIV1 P24 AG SERPL QL IA: NORMAL
IMM GRANULOCYTES # BLD AUTO: 0.01 K/UL (ref 0–0.11)
IMM GRANULOCYTES NFR BLD AUTO: 0.3 % (ref 0–0.9)
IRON SATN MFR SERPL: 40 % (ref 15–55)
IRON SERPL-MCNC: 117 UG/DL (ref 40–170)
LYMPHOCYTES # BLD AUTO: 0.94 K/UL (ref 1–4.8)
LYMPHOCYTES NFR BLD: 28.7 % (ref 22–41)
MCH RBC QN AUTO: 34.1 PG (ref 27–33)
MCHC RBC AUTO-ENTMCNC: 32.7 G/DL (ref 33.6–35)
MCV RBC AUTO: 104.3 FL (ref 81.4–97.8)
MONOCYTES # BLD AUTO: 0.27 K/UL (ref 0–0.85)
MONOCYTES NFR BLD AUTO: 8.3 % (ref 0–13.4)
NEUTROPHILS # BLD AUTO: 1.89 K/UL (ref 2–7.15)
NEUTROPHILS NFR BLD: 57.8 % (ref 44–72)
NRBC # BLD AUTO: 0 K/UL
NRBC BLD-RTO: 0 /100 WBC
PLATELET # BLD AUTO: 387 K/UL (ref 164–446)
PMV BLD AUTO: 10.9 FL (ref 9–12.9)
POTASSIUM SERPL-SCNC: 4.2 MMOL/L (ref 3.6–5.5)
PROT SERPL-MCNC: 7.3 G/DL (ref 6–8.2)
RBC # BLD AUTO: 4.19 M/UL (ref 4.2–5.4)
SODIUM SERPL-SCNC: 139 MMOL/L (ref 135–145)
TIBC SERPL-MCNC: 295 UG/DL (ref 250–450)
UIBC SERPL-MCNC: 178 UG/DL (ref 110–370)
VIT B12 SERPL-MCNC: 531 PG/ML (ref 211–911)
WBC # BLD AUTO: 3.3 K/UL (ref 4.8–10.8)

## 2021-06-05 PROCEDURE — 82607 VITAMIN B-12: CPT

## 2021-06-05 PROCEDURE — 86803 HEPATITIS C AB TEST: CPT

## 2021-06-05 PROCEDURE — 86663 EPSTEIN-BARR ANTIBODY: CPT

## 2021-06-05 PROCEDURE — 86665 EPSTEIN-BARR CAPSID VCA: CPT

## 2021-06-05 PROCEDURE — 87389 HIV-1 AG W/HIV-1&-2 AB AG IA: CPT

## 2021-06-05 PROCEDURE — 83550 IRON BINDING TEST: CPT

## 2021-06-05 PROCEDURE — 82306 VITAMIN D 25 HYDROXY: CPT

## 2021-06-05 PROCEDURE — 36415 COLL VENOUS BLD VENIPUNCTURE: CPT

## 2021-06-05 PROCEDURE — 80053 COMPREHEN METABOLIC PANEL: CPT

## 2021-06-05 PROCEDURE — 86038 ANTINUCLEAR ANTIBODIES: CPT

## 2021-06-05 PROCEDURE — 85025 COMPLETE CBC W/AUTO DIFF WBC: CPT

## 2021-06-05 PROCEDURE — 86664 EPSTEIN-BARR NUCLEAR ANTIGEN: CPT

## 2021-06-05 PROCEDURE — 82728 ASSAY OF FERRITIN: CPT

## 2021-06-05 PROCEDURE — 83036 HEMOGLOBIN GLYCOSYLATED A1C: CPT

## 2021-06-05 PROCEDURE — 83540 ASSAY OF IRON: CPT

## 2021-06-06 LAB
COLLECT DURATION TIME UR: 24 H
CREAT UR-MCNC: 20 MG/DL
CREAT URINE MG DAY Q4367: 840 MG/24H (ref 603–1783)
ME-HISTAMINE/CREAT 24H UR: 131 MCG/G CR (ref 30–200)
SPECIMEN VOL UR: 4200 ML

## 2021-06-08 LAB
25(OH)D3 SERPL-MCNC: 42 NG/ML (ref 30–80)
NUCLEAR IGG SER QL IA: NORMAL

## 2021-06-09 LAB
EBV EA-D IGG SER-ACNC: 8.2 U/ML (ref 0–10.9)
EBV NA IGG SER IA-ACNC: 416 U/ML (ref 0–21.9)
EBV VCA IGG SER IA-ACNC: 541 U/ML (ref 0–21.9)
EBV VCA IGM SER IA-ACNC: <10 U/ML (ref 0–43.9)

## 2021-06-13 LAB — MISCELLANEOUS LAB RESULT MISCLAB: NORMAL

## 2021-09-02 ENCOUNTER — HOSPITAL ENCOUNTER (OUTPATIENT)
Dept: RADIOLOGY | Facility: MEDICAL CENTER | Age: 66
End: 2021-09-02
Attending: NURSE PRACTITIONER
Payer: COMMERCIAL

## 2021-09-02 ENCOUNTER — OFFICE VISIT (OUTPATIENT)
Dept: URGENT CARE | Facility: PHYSICIAN GROUP | Age: 66
End: 2021-09-02
Payer: COMMERCIAL

## 2021-09-02 ENCOUNTER — HOSPITAL ENCOUNTER (OUTPATIENT)
Facility: MEDICAL CENTER | Age: 66
End: 2021-09-02
Attending: NURSE PRACTITIONER
Payer: COMMERCIAL

## 2021-09-02 VITALS
WEIGHT: 96 LBS | BODY MASS INDEX: 18.85 KG/M2 | SYSTOLIC BLOOD PRESSURE: 132 MMHG | HEIGHT: 60 IN | TEMPERATURE: 97.6 F | DIASTOLIC BLOOD PRESSURE: 76 MMHG | RESPIRATION RATE: 16 BRPM | HEART RATE: 89 BPM | OXYGEN SATURATION: 99 %

## 2021-09-02 DIAGNOSIS — R10.9 CHRONIC ABDOMINAL PAIN: ICD-10-CM

## 2021-09-02 DIAGNOSIS — R53.82 CHRONIC FATIGUE: ICD-10-CM

## 2021-09-02 DIAGNOSIS — R06.02 SOB (SHORTNESS OF BREATH): ICD-10-CM

## 2021-09-02 DIAGNOSIS — R10.11 RIGHT UPPER QUADRANT ABDOMINAL PAIN: ICD-10-CM

## 2021-09-02 DIAGNOSIS — G89.29 CHRONIC ABDOMINAL PAIN: ICD-10-CM

## 2021-09-02 DIAGNOSIS — K59.00 CONSTIPATION, UNSPECIFIED CONSTIPATION TYPE: ICD-10-CM

## 2021-09-02 DIAGNOSIS — Z20.822 EXPOSURE TO COVID-19 VIRUS: ICD-10-CM

## 2021-09-02 PROCEDURE — 74176 CT ABD & PELVIS W/O CONTRAST: CPT

## 2021-09-02 PROCEDURE — U0003 INFECTIOUS AGENT DETECTION BY NUCLEIC ACID (DNA OR RNA); SEVERE ACUTE RESPIRATORY SYNDROME CORONAVIRUS 2 (SARS-COV-2) (CORONAVIRUS DISEASE [COVID-19]), AMPLIFIED PROBE TECHNIQUE, MAKING USE OF HIGH THROUGHPUT TECHNOLOGIES AS DESCRIBED BY CMS-2020-01-R: HCPCS

## 2021-09-02 PROCEDURE — 99213 OFFICE O/P EST LOW 20 MIN: CPT | Mod: CS | Performed by: NURSE PRACTITIONER

## 2021-09-02 PROCEDURE — 71046 X-RAY EXAM CHEST 2 VIEWS: CPT

## 2021-09-02 PROCEDURE — U0005 INFEC AGEN DETEC AMPLI PROBE: HCPCS

## 2021-09-02 ASSESSMENT — ENCOUNTER SYMPTOMS
BLOOD IN STOOL: 0
FLANK PAIN: 0
FEVER: 0
SINUS PAIN: 0
HEARTBURN: 0
PALPITATIONS: 0
VOMITING: 0
ABDOMINAL PAIN: 1
WEAKNESS: 0
MYALGIAS: 0
SORE THROAT: 0
HEADACHES: 0
NAUSEA: 0
CONSTIPATION: 0
BACK PAIN: 0
DIARRHEA: 0
SHORTNESS OF BREATH: 1
CHILLS: 0
ORTHOPNEA: 0
DIZZINESS: 0

## 2021-09-02 ASSESSMENT — FIBROSIS 4 INDEX: FIB4 SCORE: 0.95

## 2021-09-02 NOTE — PROGRESS NOTES
Subjective     Aminah Maldonado is a 66 y.o. female who presents with Abdominal Pain (R side) and Shortness of Breath            HPI  States having recurrent right sided abdominal pain for the last few weeks. Has been seen in UC, ER and her PCP for this in the past. She has had previous blood work in 6/2021. History of EBV. Denies recent fever, nausea/vomiting, no diarrhea. States possible COVID exposure from neighbor, last saw her 3 days ago. Taking no meds for abdominal pain. States will get shortness of breath as well but this will accompany her abdominal pain. No noted cough or nasal congestion, runny nose. No history of lung problems, asthma, but has history of seasonal allergies and takes Zyrtec. Denies changes in bowel movements, no dysuria. Has not contacted her PCP about this chronic pain. Has not followed up with Gastroenterology since referral was place in 2020.     PMH:  has a past medical history of Endometriosis.  MEDS:   Current Outpatient Medications:   •  cetirizine (ZYRTEC) 10 MG Tab, Take 10 mg by mouth as needed for Allergies., Disp: , Rfl:   ALLERGIES:   Allergies   Allergen Reactions   • Cefdinir Rash   • Clarithromycin    • Contrast Media With Iodine [Iodine] Hives and Shortness of Breath   • Doxycycline Hyclate Rash     SURGHX:   Past Surgical History:   Procedure Laterality Date   • ABDOMINAL HYSTERECTOMY TOTAL      and BSO for endometriosis   • APPENDECTOMY     • TONSILLECTOMY AND ADENOIDECTOMY       SOCHX:  reports that she has never smoked. She has never used smokeless tobacco. She reports that she does not drink alcohol and does not use drugs.  FH: Family history was reviewed, no pertinent findings to report    Review of Systems   Constitutional: Negative for chills, fever and malaise/fatigue.   HENT: Negative for congestion, ear pain, sinus pain and sore throat.    Respiratory: Positive for shortness of breath.    Cardiovascular: Negative for chest pain, palpitations and orthopnea.    Gastrointestinal: Positive for abdominal pain. Negative for blood in stool, constipation, diarrhea, heartburn, melena, nausea and vomiting.   Genitourinary: Negative for dysuria, flank pain, frequency, hematuria and urgency.   Musculoskeletal: Negative for back pain and myalgias.   Neurological: Negative for dizziness, weakness and headaches.   Endo/Heme/Allergies: Positive for environmental allergies.   All other systems reviewed and are negative.             Objective     /76 (BP Location: Left arm, Patient Position: Sitting, BP Cuff Size: Adult)   Pulse 89   Temp 36.4 °C (97.6 °F) (Temporal)   Resp 16   Ht 1.524 m (5')   Wt 43.5 kg (96 lb)   SpO2 99%   BMI 18.75 kg/m²      Physical Exam  Vitals reviewed.   Constitutional:       General: She is awake. She is not in acute distress.     Appearance: Normal appearance. She is well-developed. She is not ill-appearing, toxic-appearing or diaphoretic.   HENT:      Head: Normocephalic.      Nose: Nose normal.      Mouth/Throat:      Lips: Pink.      Mouth: Mucous membranes are dry.      Pharynx: Oropharynx is clear. Uvula midline.   Eyes:      Conjunctiva/sclera: Conjunctivae normal.      Pupils: Pupils are equal, round, and reactive to light.   Cardiovascular:      Rate and Rhythm: Normal rate and regular rhythm.   Pulmonary:      Effort: Pulmonary effort is normal. No accessory muscle usage or respiratory distress.      Breath sounds: Normal breath sounds and air entry. No stridor, decreased air movement or transmitted upper airway sounds. No decreased breath sounds, wheezing, rhonchi or rales.   Abdominal:      General: Bowel sounds are normal. There is no distension.      Palpations: Abdomen is soft.      Tenderness: There is no abdominal tenderness. There is no guarding or rebound.      Comments: Generalized abdominal discomfort of all quadrants of abdomen.   Musculoskeletal:         General: Normal range of motion.      Cervical back: Normal range of  motion and neck supple.   Skin:     General: Skin is warm and dry.   Neurological:      Mental Status: She is alert and oriented to person, place, and time.   Psychiatric:         Attention and Perception: Attention normal.         Mood and Affect: Mood normal.         Speech: Speech normal.         Behavior: Behavior normal. Behavior is cooperative.         Thought Content: Thought content normal.         Cognition and Memory: Cognition and memory normal.         Judgment: Judgment normal.                             Assessment & Plan        1. Chronic abdominal pain    - REFERRAL TO GASTROENTEROLOGY  - CT-RENAL COLIC EVALUATION(A/P W/O); Future    2. SOB (shortness of breath)  - SARS-CoV-2 PCR (24 hour In-House): Collect NP swab in VTM; Future  - DX-CHEST-2 VIEWS; Future    3. Right upper quadrant abdominal pain    - CT-RENAL COLIC EVALUATION(A/P W/O); Future    4. Exposure to COVID-19 virus    - SARS-CoV-2 PCR (24 hour In-House): Collect NP swab in VTM; Future    5. Chronic fatigue    - CT-RENAL COLIC EVALUATION(A/P W/O); Future       6. Constipation, unspecified constipation type    -Increase water intake  -Recommend over the counter laxative like Miralax daily x 1 week then as needed   -May use over the counter stool softener like Colace as needed x 1 week then as needed   -Encourage fibrous food intake and balanced diet including fruits and vegetables, decrease sugar and processed foods  -May introduce a fiber supplement daily like Metamucil   -Monitor for severe abdominal pain with fever and inability to have bowel movement, ilene blood with bowel movement, fever, nausea/vomiting- may need to go to ER, patient understands this    -Stay home isolated from others until COVID test resulted the follow CDC guidelines for positive cases as discussed  -Increase water intake  -May use Tylenol/Ibuprofen as needed for fever or body aches  -Get rest  -Salt water gargle as needed   -Flonase, saline nasal spray as needed  for nasal congestion  -May use over the counter cough suppressant medications like plain Robitussin/Delsym as needed   -Monitor for fevers, worse cough, shortness of breath, chest pain, chest tightness, sinus problems- need re-evaluation  -MyChart release for result, may take up to 72 hrs for result, patient notified

## 2021-09-03 DIAGNOSIS — Z20.822 EXPOSURE TO COVID-19 VIRUS: ICD-10-CM

## 2021-09-03 DIAGNOSIS — R06.02 SOB (SHORTNESS OF BREATH): ICD-10-CM

## 2021-09-03 LAB — COVID ORDER STATUS COVID19: NORMAL

## 2021-09-04 LAB
SARS-COV-2 RNA RESP QL NAA+PROBE: NOTDETECTED
SPECIMEN SOURCE: NORMAL

## 2021-09-18 ENCOUNTER — HOSPITAL ENCOUNTER (OUTPATIENT)
Dept: LAB | Facility: MEDICAL CENTER | Age: 66
End: 2021-09-18
Attending: FAMILY MEDICINE
Payer: COMMERCIAL

## 2021-09-22 ENCOUNTER — HOSPITAL ENCOUNTER (OUTPATIENT)
Dept: LAB | Facility: MEDICAL CENTER | Age: 66
End: 2021-09-22
Attending: FAMILY MEDICINE
Payer: COMMERCIAL

## 2021-09-22 LAB
ALBUMIN SERPL BCP-MCNC: 4.7 G/DL (ref 3.2–4.9)
ALBUMIN/GLOB SERPL: 1.8 G/DL
ALP SERPL-CCNC: 153 U/L (ref 30–99)
ALT SERPL-CCNC: 11 U/L (ref 2–50)
ANION GAP SERPL CALC-SCNC: 15 MMOL/L (ref 7–16)
AST SERPL-CCNC: 19 U/L (ref 12–45)
BASOPHILS # BLD AUTO: 1 % (ref 0–1.8)
BASOPHILS # BLD: 0.06 K/UL (ref 0–0.12)
BILIRUB SERPL-MCNC: 1.7 MG/DL (ref 0.1–1.5)
BUN SERPL-MCNC: 5 MG/DL (ref 8–22)
CALCIUM SERPL-MCNC: 10 MG/DL (ref 8.5–10.5)
CHLORIDE SERPL-SCNC: 104 MMOL/L (ref 96–112)
CO2 SERPL-SCNC: 25 MMOL/L (ref 20–33)
CREAT SERPL-MCNC: 0.52 MG/DL (ref 0.5–1.4)
EOSINOPHIL # BLD AUTO: 0.03 K/UL (ref 0–0.51)
EOSINOPHIL NFR BLD: 0.5 % (ref 0–6.9)
ERYTHROCYTE [DISTWIDTH] IN BLOOD BY AUTOMATED COUNT: 44 FL (ref 35.9–50)
GLOBULIN SER CALC-MCNC: 2.6 G/DL (ref 1.9–3.5)
GLUCOSE SERPL-MCNC: 88 MG/DL (ref 65–99)
HCT VFR BLD AUTO: 40.6 % (ref 37–47)
HGB BLD-MCNC: 13.6 G/DL (ref 12–16)
IMM GRANULOCYTES # BLD AUTO: 0.02 K/UL (ref 0–0.11)
IMM GRANULOCYTES NFR BLD AUTO: 0.3 % (ref 0–0.9)
LYMPHOCYTES # BLD AUTO: 1.26 K/UL (ref 1–4.8)
LYMPHOCYTES NFR BLD: 20.9 % (ref 22–41)
MCH RBC QN AUTO: 34.3 PG (ref 27–33)
MCHC RBC AUTO-ENTMCNC: 33.5 G/DL (ref 33.6–35)
MCV RBC AUTO: 102.5 FL (ref 81.4–97.8)
MONOCYTES # BLD AUTO: 0.41 K/UL (ref 0–0.85)
MONOCYTES NFR BLD AUTO: 6.8 % (ref 0–13.4)
NEUTROPHILS # BLD AUTO: 4.24 K/UL (ref 2–7.15)
NEUTROPHILS NFR BLD: 70.5 % (ref 44–72)
NRBC # BLD AUTO: 0 K/UL
NRBC BLD-RTO: 0 /100 WBC
PLATELET # BLD AUTO: 388 K/UL (ref 164–446)
PMV BLD AUTO: 10.8 FL (ref 9–12.9)
POTASSIUM SERPL-SCNC: 4.2 MMOL/L (ref 3.6–5.5)
PROT SERPL-MCNC: 7.3 G/DL (ref 6–8.2)
RBC # BLD AUTO: 3.96 M/UL (ref 4.2–5.4)
SODIUM SERPL-SCNC: 144 MMOL/L (ref 135–145)
TSH SERPL DL<=0.005 MIU/L-ACNC: 1.45 UIU/ML (ref 0.38–5.33)
WBC # BLD AUTO: 6 K/UL (ref 4.8–10.8)

## 2021-09-22 PROCEDURE — 84443 ASSAY THYROID STIM HORMONE: CPT

## 2021-09-22 PROCEDURE — 85025 COMPLETE CBC W/AUTO DIFF WBC: CPT

## 2021-09-22 PROCEDURE — 86665 EPSTEIN-BARR CAPSID VCA: CPT | Mod: 91

## 2021-09-22 PROCEDURE — 86664 EPSTEIN-BARR NUCLEAR ANTIGEN: CPT

## 2021-09-22 PROCEDURE — 80053 COMPREHEN METABOLIC PANEL: CPT

## 2021-09-22 PROCEDURE — 86663 EPSTEIN-BARR ANTIBODY: CPT

## 2021-09-22 PROCEDURE — 36415 COLL VENOUS BLD VENIPUNCTURE: CPT

## 2021-09-24 LAB
EBV EA-D IGG SER-ACNC: 7.3 U/ML (ref 0–10.9)
EBV NA IGG SER IA-ACNC: 453 U/ML (ref 0–21.9)
EBV VCA IGG SER IA-ACNC: 526 U/ML (ref 0–21.9)
EBV VCA IGM SER IA-ACNC: <10 U/ML (ref 0–43.9)

## 2021-11-09 ENCOUNTER — HOSPITAL ENCOUNTER (OUTPATIENT)
Dept: RADIOLOGY | Facility: MEDICAL CENTER | Age: 66
End: 2021-11-09
Attending: NURSE PRACTITIONER
Payer: COMMERCIAL

## 2021-11-09 DIAGNOSIS — Z12.31 VISIT FOR SCREENING MAMMOGRAM: ICD-10-CM

## 2021-11-09 PROCEDURE — 77063 BREAST TOMOSYNTHESIS BI: CPT

## 2021-12-23 ENCOUNTER — HOSPITAL ENCOUNTER (OUTPATIENT)
Dept: LAB | Facility: MEDICAL CENTER | Age: 66
End: 2021-12-23
Attending: FAMILY MEDICINE
Payer: COMMERCIAL

## 2021-12-23 LAB
ALBUMIN SERPL BCP-MCNC: 4.7 G/DL (ref 3.2–4.9)
ALBUMIN/GLOB SERPL: 1.9 G/DL
ALP SERPL-CCNC: 164 U/L (ref 30–99)
ALT SERPL-CCNC: 13 U/L (ref 2–50)
ANION GAP SERPL CALC-SCNC: 12 MMOL/L (ref 7–16)
AST SERPL-CCNC: 16 U/L (ref 12–45)
BASOPHILS # BLD AUTO: 1.4 % (ref 0–1.8)
BASOPHILS # BLD: 0.06 K/UL (ref 0–0.12)
BILIRUB SERPL-MCNC: 1.4 MG/DL (ref 0.1–1.5)
BUN SERPL-MCNC: 8 MG/DL (ref 8–22)
CALCIUM SERPL-MCNC: 9.5 MG/DL (ref 8.5–10.5)
CHLORIDE SERPL-SCNC: 106 MMOL/L (ref 96–112)
CO2 SERPL-SCNC: 26 MMOL/L (ref 20–33)
CREAT SERPL-MCNC: 0.78 MG/DL (ref 0.5–1.4)
EOSINOPHIL # BLD AUTO: 0.05 K/UL (ref 0–0.51)
EOSINOPHIL NFR BLD: 1.1 % (ref 0–6.9)
ERYTHROCYTE [DISTWIDTH] IN BLOOD BY AUTOMATED COUNT: 43.2 FL (ref 35.9–50)
GGT SERPL-CCNC: 13 U/L (ref 7–34)
GLOBULIN SER CALC-MCNC: 2.5 G/DL (ref 1.9–3.5)
GLUCOSE SERPL-MCNC: 95 MG/DL (ref 65–99)
HCT VFR BLD AUTO: 38.3 % (ref 37–47)
HGB BLD-MCNC: 13.4 G/DL (ref 12–16)
IMM GRANULOCYTES # BLD AUTO: 0.01 K/UL (ref 0–0.11)
IMM GRANULOCYTES NFR BLD AUTO: 0.2 % (ref 0–0.9)
LYMPHOCYTES # BLD AUTO: 1.15 K/UL (ref 1–4.8)
LYMPHOCYTES NFR BLD: 26.3 % (ref 22–41)
MCH RBC QN AUTO: 35.9 PG (ref 27–33)
MCHC RBC AUTO-ENTMCNC: 35 G/DL (ref 33.6–35)
MCV RBC AUTO: 102.7 FL (ref 81.4–97.8)
MONOCYTES # BLD AUTO: 0.34 K/UL (ref 0–0.85)
MONOCYTES NFR BLD AUTO: 7.8 % (ref 0–13.4)
NEUTROPHILS # BLD AUTO: 2.76 K/UL (ref 2–7.15)
NEUTROPHILS NFR BLD: 63.2 % (ref 44–72)
NRBC # BLD AUTO: 0 K/UL
NRBC BLD-RTO: 0 /100 WBC
PLATELET # BLD AUTO: 387 K/UL (ref 164–446)
PMV BLD AUTO: 10.4 FL (ref 9–12.9)
POTASSIUM SERPL-SCNC: 4 MMOL/L (ref 3.6–5.5)
PROT SERPL-MCNC: 7.2 G/DL (ref 6–8.2)
RBC # BLD AUTO: 3.73 M/UL (ref 4.2–5.4)
SODIUM SERPL-SCNC: 144 MMOL/L (ref 135–145)
WBC # BLD AUTO: 4.4 K/UL (ref 4.8–10.8)

## 2021-12-23 PROCEDURE — 86256 FLUORESCENT ANTIBODY TITER: CPT

## 2021-12-23 PROCEDURE — 83520 IMMUNOASSAY QUANT NOS NONAB: CPT

## 2021-12-23 PROCEDURE — 84075 ASSAY ALKALINE PHOSPHATASE: CPT

## 2021-12-23 PROCEDURE — 82977 ASSAY OF GGT: CPT

## 2021-12-23 PROCEDURE — 83516 IMMUNOASSAY NONANTIBODY: CPT

## 2021-12-23 PROCEDURE — 86038 ANTINUCLEAR ANTIBODIES: CPT

## 2021-12-23 PROCEDURE — 80053 COMPREHEN METABOLIC PANEL: CPT

## 2021-12-23 PROCEDURE — 36415 COLL VENOUS BLD VENIPUNCTURE: CPT

## 2021-12-23 PROCEDURE — 84080 ASSAY ALKALINE PHOSPHATASES: CPT

## 2021-12-23 PROCEDURE — 85025 COMPLETE CBC W/AUTO DIFF WBC: CPT

## 2021-12-28 LAB — NUCLEAR IGG SER QL IA: NORMAL

## 2021-12-29 LAB
ALP BONE SERPL-CCNC: 91 U/L (ref 0–55)
ALP ISOS SERPL HS-CCNC: 0 U/L
ALP LIVER SERPL-CCNC: 75 U/L (ref 0–94)
ALP SERPL-CCNC: 166 U/L (ref 40–120)

## 2022-01-10 ENCOUNTER — HOSPITAL ENCOUNTER (OUTPATIENT)
Dept: RADIOLOGY | Facility: MEDICAL CENTER | Age: 67
End: 2022-01-10
Attending: FAMILY MEDICINE
Payer: COMMERCIAL

## 2022-01-10 DIAGNOSIS — R74.8 ALKALINE PHOSPHATASE ELEVATION: ICD-10-CM

## 2022-01-10 DIAGNOSIS — L29.9 GENERALIZED PRURITUS: ICD-10-CM

## 2022-01-10 DIAGNOSIS — R10.11 ABDOMINAL PAIN, RIGHT UPPER QUADRANT: ICD-10-CM

## 2022-01-10 DIAGNOSIS — R17 CHOLEMIA: ICD-10-CM

## 2022-01-10 PROCEDURE — 76700 US EXAM ABDOM COMPLETE: CPT

## 2022-03-19 ENCOUNTER — HOSPITAL ENCOUNTER (OUTPATIENT)
Dept: LAB | Facility: MEDICAL CENTER | Age: 67
End: 2022-03-19
Attending: INTERNAL MEDICINE
Payer: COMMERCIAL

## 2022-03-19 LAB
APTT PPP: 28.1 SEC (ref 24.7–36)
INR PPP: 0.93 (ref 0.87–1.13)
PROTHROMBIN TIME: 12.2 SEC (ref 12–14.6)

## 2022-03-19 PROCEDURE — 36415 COLL VENOUS BLD VENIPUNCTURE: CPT

## 2022-03-19 PROCEDURE — 86015 ACTIN ANTIBODY EACH: CPT

## 2022-03-19 PROCEDURE — 85610 PROTHROMBIN TIME: CPT

## 2022-03-19 PROCEDURE — 86381 MITOCHONDRIAL ANTIBODY EACH: CPT

## 2022-03-19 PROCEDURE — 85730 THROMBOPLASTIN TIME PARTIAL: CPT

## 2022-03-22 LAB
MITOCHONDRIA M2 IGG SER-ACNC: 3 UNITS (ref 0–24.9)
SMA IGG SER-ACNC: 6 UNITS (ref 0–19)

## 2022-03-25 ENCOUNTER — APPOINTMENT (OUTPATIENT)
Dept: RADIOLOGY | Facility: MEDICAL CENTER | Age: 67
End: 2022-03-25
Attending: EMERGENCY MEDICINE
Payer: COMMERCIAL

## 2022-03-25 ENCOUNTER — HOSPITAL ENCOUNTER (EMERGENCY)
Facility: MEDICAL CENTER | Age: 67
End: 2022-03-25
Attending: EMERGENCY MEDICINE
Payer: COMMERCIAL

## 2022-03-25 VITALS
WEIGHT: 96.12 LBS | BODY MASS INDEX: 18.87 KG/M2 | RESPIRATION RATE: 19 BRPM | OXYGEN SATURATION: 98 % | HEART RATE: 80 BPM | DIASTOLIC BLOOD PRESSURE: 77 MMHG | HEIGHT: 60 IN | SYSTOLIC BLOOD PRESSURE: 133 MMHG | TEMPERATURE: 98.8 F

## 2022-03-25 DIAGNOSIS — R52 GENERALIZED BODY ACHES: ICD-10-CM

## 2022-03-25 DIAGNOSIS — G89.29 CHRONIC THORACIC BACK PAIN, UNSPECIFIED BACK PAIN LATERALITY: ICD-10-CM

## 2022-03-25 DIAGNOSIS — M54.6 CHRONIC THORACIC BACK PAIN, UNSPECIFIED BACK PAIN LATERALITY: ICD-10-CM

## 2022-03-25 LAB
ANION GAP SERPL CALC-SCNC: 11 MMOL/L (ref 7–16)
BASOPHILS # BLD AUTO: 1.2 % (ref 0–1.8)
BASOPHILS # BLD: 0.03 K/UL (ref 0–0.12)
BUN SERPL-MCNC: 4 MG/DL (ref 8–22)
CALCIUM SERPL-MCNC: 9.2 MG/DL (ref 8.4–10.2)
CHLORIDE SERPL-SCNC: 101 MMOL/L (ref 96–112)
CO2 SERPL-SCNC: 26 MMOL/L (ref 20–33)
CREAT SERPL-MCNC: 0.54 MG/DL (ref 0.5–1.4)
CRP SERPL HS-MCNC: <0.3 MG/DL (ref 0–0.75)
EOSINOPHIL # BLD AUTO: 0.01 K/UL (ref 0–0.51)
EOSINOPHIL NFR BLD: 0.4 % (ref 0–6.9)
ERYTHROCYTE [DISTWIDTH] IN BLOOD BY AUTOMATED COUNT: 41.4 FL (ref 35.9–50)
ERYTHROCYTE [SEDIMENTATION RATE] IN BLOOD BY WESTERGREN METHOD: 12 MM/HOUR (ref 0–25)
GFR SERPLBLD CREATININE-BSD FMLA CKD-EPI: 102 ML/MIN/1.73 M 2
GLUCOSE SERPL-MCNC: 98 MG/DL (ref 65–99)
HCT VFR BLD AUTO: 40 % (ref 37–47)
HGB BLD-MCNC: 13.6 G/DL (ref 12–16)
IMM GRANULOCYTES # BLD AUTO: 0 K/UL (ref 0–0.11)
IMM GRANULOCYTES NFR BLD AUTO: 0 % (ref 0–0.9)
LYMPHOCYTES # BLD AUTO: 0.3 K/UL (ref 1–4.8)
LYMPHOCYTES NFR BLD: 11.5 % (ref 22–41)
MCH RBC QN AUTO: 34.3 PG (ref 27–33)
MCHC RBC AUTO-ENTMCNC: 34 G/DL (ref 33.6–35)
MCV RBC AUTO: 101 FL (ref 81.4–97.8)
MONOCYTES # BLD AUTO: 0.35 K/UL (ref 0–0.85)
MONOCYTES NFR BLD AUTO: 13.5 % (ref 0–13.4)
NEUTROPHILS # BLD AUTO: 1.91 K/UL (ref 2–7.15)
NEUTROPHILS NFR BLD: 73.4 % (ref 44–72)
NRBC # BLD AUTO: 0 K/UL
NRBC BLD-RTO: 0 /100 WBC
PLATELET # BLD AUTO: 281 K/UL (ref 164–446)
PMV BLD AUTO: 9.7 FL (ref 9–12.9)
POTASSIUM SERPL-SCNC: 4.3 MMOL/L (ref 3.6–5.5)
RBC # BLD AUTO: 3.96 M/UL (ref 4.2–5.4)
RHEUMATOID FACT SER IA-ACNC: <10 IU/ML (ref 0–14)
SODIUM SERPL-SCNC: 138 MMOL/L (ref 135–145)
WBC # BLD AUTO: 2.6 K/UL (ref 4.8–10.8)

## 2022-03-25 PROCEDURE — 36415 COLL VENOUS BLD VENIPUNCTURE: CPT

## 2022-03-25 PROCEDURE — 86140 C-REACTIVE PROTEIN: CPT

## 2022-03-25 PROCEDURE — 85652 RBC SED RATE AUTOMATED: CPT

## 2022-03-25 PROCEDURE — 99283 EMERGENCY DEPT VISIT LOW MDM: CPT

## 2022-03-25 PROCEDURE — A9270 NON-COVERED ITEM OR SERVICE: HCPCS | Performed by: EMERGENCY MEDICINE

## 2022-03-25 PROCEDURE — 80048 BASIC METABOLIC PNL TOTAL CA: CPT

## 2022-03-25 PROCEDURE — 86038 ANTINUCLEAR ANTIBODIES: CPT

## 2022-03-25 PROCEDURE — 86431 RHEUMATOID FACTOR QUANT: CPT

## 2022-03-25 PROCEDURE — 700102 HCHG RX REV CODE 250 W/ 637 OVERRIDE(OP): Performed by: EMERGENCY MEDICINE

## 2022-03-25 PROCEDURE — 72070 X-RAY EXAM THORAC SPINE 2VWS: CPT

## 2022-03-25 PROCEDURE — 85025 COMPLETE CBC W/AUTO DIFF WBC: CPT

## 2022-03-25 RX ORDER — GABAPENTIN 100 MG/1
100 CAPSULE ORAL ONCE
Status: DISCONTINUED | OUTPATIENT
Start: 2022-03-25 | End: 2022-03-25 | Stop reason: HOSPADM

## 2022-03-25 RX ORDER — GABAPENTIN 100 MG/1
CAPSULE ORAL
Qty: 75 CAPSULE | Refills: 0 | Status: SHIPPED | OUTPATIENT
Start: 2022-03-25 | End: 2022-04-24

## 2022-03-25 RX ORDER — NAPROXEN 250 MG/1
500 TABLET ORAL 2 TIMES DAILY
Status: DISCONTINUED | OUTPATIENT
Start: 2022-03-25 | End: 2022-03-25 | Stop reason: HOSPADM

## 2022-03-25 RX ADMIN — NAPROXEN 500 MG: 250 TABLET ORAL at 09:41

## 2022-03-25 ASSESSMENT — FIBROSIS 4 INDEX: FIB4 SCORE: 0.73

## 2022-03-25 NOTE — DISCHARGE INSTRUCTIONS
Your back x-ray showed significant arthritis type changes.  This is the most likely cause of your back pain.  Your blood work is normal so far, though as we discussed, there are some tests in process that will take a while to come back, that may be helpful for your primary care doctor.    It is nearly impossible for the emergency department to diagnose and treat chronic pain issues, but we have made a start at evaluating this with you, which should allow you and your primary care doctor to discuss your results and consider further testing or referral.  In the meantime, it is reasonable to try gabapentin, using the prescription we have provided.    Please call today to schedule a follow-up with your primary care doctor.

## 2022-03-25 NOTE — ED PROVIDER NOTES
ED Provider Note    Scribed for Clovis Dior M.D. by Clovis Dior M.D.. 3/25/2022,  9:21 AM.    CHIEF COMPLAINT  Chief Complaint   Patient presents with   • Back Pain     R mid back pain that comes and goes but has increased over the last few days.    • Pain     Generalized pain off and on for the last year, PMD unable to find cause. Has been having angioedema flares that are unexplained, for the last year. No facial swelling at this time.        HPI  Aminah Maldonado is a 66 y.o. female who presents to the Emergency Department reporting generalized body aches, and right-sided mid back pain, for about a year.  She saw her primary care doctor on Wednesday for episodes of facial swelling, which has resolved, and for which she takes Zyrtec, but she reports she did not mention the chronic back pain or chronic body aches.  She occasionally takes Tylenol or ibuprofen for the symptoms.  She has never seen rheumatology or neurology or pain management, having not yet discussed this chronic issue with her primary care doctor.  She does not complain of fevers or dysuria.  There are no obvious exacerbating or relieving factors.    REVIEW OF SYSTEMS  See HPI for further details. All other systems are negative.     PAST MEDICAL HISTORY   has a past medical history of Endometriosis.    SOCIAL HISTORY  Social History     Tobacco Use   • Smoking status: Never Smoker   • Smokeless tobacco: Never Used   Substance and Sexual Activity   • Alcohol use: No     Alcohol/week: 1.2 oz     Types: 2 Shots of liquor per week     Comment: Rarely   • Drug use: No   • Sexual activity: Never     Social History     Substance and Sexual Activity   Drug Use No       SURGICAL HISTORY   has a past surgical history that includes appendectomy; abdominal hysterectomy total; and tonsillectomy and adenoidectomy.    CURRENT MEDICATIONS  Home Medications    **Home medications have not yet been reviewed for this encounter**          ALLERGIES  Allergies   Allergen Reactions   • Cefdinir Rash   • Clarithromycin    • Contrast Media With Iodine [Iodine] Hives and Shortness of Breath   • Doxycycline Hyclate Rash       PHYSICAL EXAM  VITAL SIGNS: /85   Pulse (!) 102   Temp 37.6 °C (99.7 °F) (Temporal)   Resp 19   Ht 1.524 m (5')   Wt 43.6 kg (96 lb 1.9 oz)   SpO2 99%   BMI 18.77 kg/m²   Pulse ox interpretation: I interpret this pulse ox as normal.  Constitutional: Alert in no apparent distress.  HENT: No signs of trauma, Bilateral external ears normal, Nose normal.   Eyes: Conjunctiva normal, Non-icteric.   Neck: Normal range of motion, Supple, No stridor.   Lymphatic: No lymphadenopathy noted.   Cardiovascular: Regular rate and rhythm, no murmurs.   Thorax & Lungs: Normal breath sounds, No respiratory distress, No wheezing, No chest tenderness.   Abdomen: Bowel sounds normal, Soft, No tenderness, No masses, No pulsatile masses. No peritoneal signs.  Skin: Warm, Dry, No erythema, No rash.   Back: No midline bony tenderness. Generalized back muscle tenderness without obvious spasm.   Extremities: Intact distal pulses, No edema, No cyanosis.  Musculoskeletal: Good range of motion in all major joints. No or major deformities noted.   Neurologic: Alert , Normal motor function, Normal sensory function, No focal deficits noted.   Psychiatric: Affect normal, Judgment normal, Mood normal.     DIAGNOSTIC STUDIES / PROCEDURES        LABS  Labs Reviewed   CBC WITH DIFFERENTIAL - Abnormal; Notable for the following components:       Result Value    WBC 2.6 (*)     RBC 3.96 (*)     .0 (*)     MCH 34.3 (*)     Neutrophils-Polys 73.40 (*)     Lymphocytes 11.50 (*)     Monocytes 13.50 (*)     Neutrophils (Absolute) 1.91 (*)     Lymphs (Absolute) 0.30 (*)     All other components within normal limits   BASIC METABOLIC PANEL - Abnormal; Notable for the following components:    Bun 4 (*)     All other components within normal limits   SED  RATE   CRP QUANTITIVE (NON-CARDIAC)   ESTIMATED GFR   EUFEMIA REFLEXIVE PROFILE   RHEUMATOID ARTHRITIS FACTOR     All labs reviewed by me.    RADIOLOGY  DX-THORACIC SPINE-2 VIEWS   Final Result      Unchanged thoracic spine with several mild mid thoracic anterior wedge compression fractures and moderate degenerative disc disease.        The radiologist's interpretation of all radiological studies have been reviewed by me.    COURSE & MEDICAL DECISION MAKING  Nursing notes, VS, ELVISHx reviewed in chart.     9:21 AM Patient seen and examined at bedside.  We had a long discussion regarding the inability of the emergency department to resolve chronic pain issues, but that we can evaluate acute concerns, and establish a baseline and create some forward momentum for this chronic problem.  I have ordered an x-ray of the patient's T-spine, and ordered basic blood work, with the addition of CRP, sed rate, EUFEMIA, and rheumatoid factor, which, while not relevant to today's evaluation, given the chronicity of her complaints, will be helpful in a follow-up discussion with her PMD, which she needs to schedule as soon as possible to continue her evaluation.  She will be treated with naproxen and gabapentin.    10:30 AM this patient's tests are unremarkable, in terms of her blood work.  There is degenerative change noted on her thoracic x-ray, with no signs of acute fracture.  This is consistent with the back pain she has been experiencing.  A gentle gabapentin ramp up to 300 mg daily will be started.  I have reemphasized the importance of following up with her primary care doctor to discuss this chronic condition, review her still pending test results, and consider additional testing or referral.     The patient will return for new or worsening symptoms and is stable at the time of discharge.    The patient is referred to a primary physician for blood pressure management, diabetic screening, and for all other preventative health  concerns.      DISPOSITION:  Patient will be discharged home in stable condition.    FOLLOW UP:  Hilton Mckeon M.D.  [address]  [facility] [address]  [facility] [id number] [phone]    Schedule an appointment as soon as possible for a visit         OUTPATIENT MEDICATIONS:  New Prescriptions    GABAPENTIN (NEURONTIN) 100 MG CAP    Take 1 Capsule by mouth every day for 5 days, THEN 1 Capsule 2 times a day for 5 days, THEN 1 Capsule 3 times a day for 20 days.         FINAL IMPRESSION  1. Chronic thoracic back pain, unspecified back pain laterality    2. Generalized body aches

## 2022-03-25 NOTE — ED NOTES
Pt states that her entire body hurts when asked how long its been going on for she states that its been going on for a year

## 2022-03-25 NOTE — ED TRIAGE NOTES
Chief Complaint   Patient presents with   • Back Pain     R mid back pain that comes and goes but has increased over the last few days.    • Pain     Generalized pain off and on for the last year, PMD unable to find cause. Has been having angioedema flares that are unexplained, for the last year. No facial swelling at this time.      /85   Pulse (!) 102   Temp 37.6 °C (99.7 °F) (Temporal)   Resp 19   Ht 1.524 m (5')   Wt 43.6 kg (96 lb 1.9 oz)   SpO2 99%   BMI 18.77 kg/m²

## 2022-03-25 NOTE — ED NOTES
Pt given d/c paperwork, RX p/u information, and f/u information; pt verbalized understanding all information given. Pt ambulated out of the ER w/o difficulty w/ friend

## 2022-03-27 LAB — NUCLEAR IGG SER QL IA: NORMAL

## 2022-06-18 ENCOUNTER — HOSPITAL ENCOUNTER (OUTPATIENT)
Dept: LAB | Facility: MEDICAL CENTER | Age: 67
End: 2022-06-18
Attending: NURSE PRACTITIONER
Payer: COMMERCIAL

## 2022-06-18 LAB
25(OH)D3 SERPL-MCNC: 25 NG/ML (ref 30–100)
ALBUMIN SERPL BCP-MCNC: 4.6 G/DL (ref 3.2–4.9)
ALBUMIN/GLOB SERPL: 1.8 G/DL
ALP SERPL-CCNC: 162 U/L (ref 30–99)
ALT SERPL-CCNC: 10 U/L (ref 2–50)
ANION GAP SERPL CALC-SCNC: 12 MMOL/L (ref 7–16)
AST SERPL-CCNC: 14 U/L (ref 12–45)
BILIRUB SERPL-MCNC: 1.5 MG/DL (ref 0.1–1.5)
BUN SERPL-MCNC: 7 MG/DL (ref 8–22)
CALCIUM SERPL-MCNC: 9.5 MG/DL (ref 8.5–10.5)
CHLORIDE SERPL-SCNC: 103 MMOL/L (ref 96–112)
CHOLEST SERPL-MCNC: 182 MG/DL (ref 100–199)
CO2 SERPL-SCNC: 25 MMOL/L (ref 20–33)
CREAT SERPL-MCNC: 0.48 MG/DL (ref 0.5–1.4)
ERYTHROCYTE [DISTWIDTH] IN BLOOD BY AUTOMATED COUNT: 45.3 FL (ref 35.9–50)
FASTING STATUS PATIENT QL REPORTED: NORMAL
FOLATE SERPL-MCNC: 9.3 NG/ML
GFR SERPLBLD CREATININE-BSD FMLA CKD-EPI: 105 ML/MIN/1.73 M 2
GLOBULIN SER CALC-MCNC: 2.5 G/DL (ref 1.9–3.5)
GLUCOSE SERPL-MCNC: 95 MG/DL (ref 65–99)
HCT VFR BLD AUTO: 42.7 % (ref 37–47)
HDLC SERPL-MCNC: 59 MG/DL
HGB BLD-MCNC: 14.4 G/DL (ref 12–16)
IRON SATN MFR SERPL: 63 % (ref 15–55)
IRON SERPL-MCNC: 190 UG/DL (ref 40–170)
LDLC SERPL CALC-MCNC: 107 MG/DL
MCH RBC QN AUTO: 34.8 PG (ref 27–33)
MCHC RBC AUTO-ENTMCNC: 33.7 G/DL (ref 33.6–35)
MCV RBC AUTO: 103.1 FL (ref 81.4–97.8)
PLATELET # BLD AUTO: 376 K/UL (ref 164–446)
PMV BLD AUTO: 11 FL (ref 9–12.9)
POTASSIUM SERPL-SCNC: 3.7 MMOL/L (ref 3.6–5.5)
PROT SERPL-MCNC: 7.1 G/DL (ref 6–8.2)
RBC # BLD AUTO: 4.14 M/UL (ref 4.2–5.4)
SODIUM SERPL-SCNC: 140 MMOL/L (ref 135–145)
T4 FREE SERPL-MCNC: 1.32 NG/DL (ref 0.93–1.7)
THYROPEROXIDASE AB SERPL-ACNC: <9 IU/ML (ref 0–9)
TIBC SERPL-MCNC: 301 UG/DL (ref 250–450)
TRIGL SERPL-MCNC: 80 MG/DL (ref 0–149)
TSH SERPL DL<=0.005 MIU/L-ACNC: 1.9 UIU/ML (ref 0.38–5.33)
UIBC SERPL-MCNC: 111 UG/DL (ref 110–370)
VIT B12 SERPL-MCNC: 401 PG/ML (ref 211–911)
WBC # BLD AUTO: 3.9 K/UL (ref 4.8–10.8)

## 2022-06-18 PROCEDURE — 82607 VITAMIN B-12: CPT

## 2022-06-18 PROCEDURE — 85027 COMPLETE CBC AUTOMATED: CPT

## 2022-06-18 PROCEDURE — 83540 ASSAY OF IRON: CPT

## 2022-06-18 PROCEDURE — 86376 MICROSOMAL ANTIBODY EACH: CPT

## 2022-06-18 PROCEDURE — 36415 COLL VENOUS BLD VENIPUNCTURE: CPT

## 2022-06-18 PROCEDURE — 80053 COMPREHEN METABOLIC PANEL: CPT

## 2022-06-18 PROCEDURE — 82746 ASSAY OF FOLIC ACID SERUM: CPT

## 2022-06-18 PROCEDURE — 83550 IRON BINDING TEST: CPT

## 2022-06-18 PROCEDURE — 80061 LIPID PANEL: CPT

## 2022-06-18 PROCEDURE — 82306 VITAMIN D 25 HYDROXY: CPT

## 2022-06-18 PROCEDURE — 84439 ASSAY OF FREE THYROXINE: CPT

## 2022-06-18 PROCEDURE — 84443 ASSAY THYROID STIM HORMONE: CPT

## 2022-08-26 ENCOUNTER — HOSPITAL ENCOUNTER (OUTPATIENT)
Dept: LAB | Facility: MEDICAL CENTER | Age: 67
End: 2022-08-26
Attending: FAMILY MEDICINE
Payer: COMMERCIAL

## 2022-08-26 LAB — CORTIS SERPL-MCNC: 15.5 UG/DL (ref 0–23)

## 2022-08-26 PROCEDURE — 84244 ASSAY OF RENIN: CPT

## 2022-08-26 PROCEDURE — 83516 IMMUNOASSAY NONANTIBODY: CPT

## 2022-08-26 PROCEDURE — 82088 ASSAY OF ALDOSTERONE: CPT

## 2022-08-26 PROCEDURE — 82024 ASSAY OF ACTH: CPT

## 2022-08-26 PROCEDURE — 36415 COLL VENOUS BLD VENIPUNCTURE: CPT

## 2022-08-26 PROCEDURE — 82533 TOTAL CORTISOL: CPT

## 2022-08-27 LAB — ACTH PLAS-MCNC: 30.3 PG/ML (ref 7.2–63.3)

## 2022-08-28 LAB — ALDOST SERPL-MCNC: 8.4 NG/DL

## 2022-08-29 LAB — RENIN PLAS-CCNC: 0.9 NG/ML/HR

## 2022-09-06 LAB — 21HYDROXYLASE AB SER QL: NEGATIVE

## 2024-01-10 ENCOUNTER — HOSPITAL ENCOUNTER (OUTPATIENT)
Dept: LAB | Facility: MEDICAL CENTER | Age: 69
End: 2024-01-10
Attending: FAMILY MEDICINE
Payer: MEDICARE

## 2024-01-10 LAB
ALBUMIN SERPL BCP-MCNC: 4.5 G/DL (ref 3.2–4.9)
ALBUMIN/GLOB SERPL: 2 G/DL
ALP SERPL-CCNC: 198 U/L (ref 30–99)
ALT SERPL-CCNC: 14 U/L (ref 2–50)
ANION GAP SERPL CALC-SCNC: 9 MMOL/L (ref 7–16)
AST SERPL-CCNC: 20 U/L (ref 12–45)
BASOPHILS # BLD AUTO: 1.5 % (ref 0–1.8)
BASOPHILS # BLD: 0.08 K/UL (ref 0–0.12)
BILIRUB SERPL-MCNC: 0.7 MG/DL (ref 0.1–1.5)
BUN SERPL-MCNC: 12 MG/DL (ref 8–22)
CALCIUM ALBUM COR SERPL-MCNC: 9.2 MG/DL (ref 8.5–10.5)
CALCIUM SERPL-MCNC: 9.6 MG/DL (ref 8.5–10.5)
CHLORIDE SERPL-SCNC: 101 MMOL/L (ref 96–112)
CO2 SERPL-SCNC: 30 MMOL/L (ref 20–33)
CREAT SERPL-MCNC: 0.43 MG/DL (ref 0.5–1.4)
EOSINOPHIL # BLD AUTO: 0.1 K/UL (ref 0–0.51)
EOSINOPHIL NFR BLD: 1.9 % (ref 0–6.9)
ERYTHROCYTE [DISTWIDTH] IN BLOOD BY AUTOMATED COUNT: 43.2 FL (ref 35.9–50)
GFR SERPLBLD CREATININE-BSD FMLA CKD-EPI: 107 ML/MIN/1.73 M 2
GLOBULIN SER CALC-MCNC: 2.3 G/DL (ref 1.9–3.5)
GLUCOSE SERPL-MCNC: 84 MG/DL (ref 65–99)
HCT VFR BLD AUTO: 40.6 % (ref 37–47)
HGB BLD-MCNC: 13.6 G/DL (ref 12–16)
IMM GRANULOCYTES # BLD AUTO: 0.01 K/UL (ref 0–0.11)
IMM GRANULOCYTES NFR BLD AUTO: 0.2 % (ref 0–0.9)
LYMPHOCYTES # BLD AUTO: 1.38 K/UL (ref 1–4.8)
LYMPHOCYTES NFR BLD: 26.3 % (ref 22–41)
MCH RBC QN AUTO: 33.6 PG (ref 27–33)
MCHC RBC AUTO-ENTMCNC: 33.5 G/DL (ref 32.2–35.5)
MCV RBC AUTO: 100.2 FL (ref 81.4–97.8)
MONOCYTES # BLD AUTO: 0.36 K/UL (ref 0–0.85)
MONOCYTES NFR BLD AUTO: 6.9 % (ref 0–13.4)
NEUTROPHILS # BLD AUTO: 3.31 K/UL (ref 1.82–7.42)
NEUTROPHILS NFR BLD: 63.2 % (ref 44–72)
NRBC # BLD AUTO: 0 K/UL
NRBC BLD-RTO: 0 /100 WBC (ref 0–0.2)
PLATELET # BLD AUTO: 390 K/UL (ref 164–446)
PMV BLD AUTO: 11.1 FL (ref 9–12.9)
POTASSIUM SERPL-SCNC: 4.5 MMOL/L (ref 3.6–5.5)
PROT SERPL-MCNC: 6.8 G/DL (ref 6–8.2)
RBC # BLD AUTO: 4.05 M/UL (ref 4.2–5.4)
SODIUM SERPL-SCNC: 140 MMOL/L (ref 135–145)
WBC # BLD AUTO: 5.2 K/UL (ref 4.8–10.8)

## 2024-01-10 PROCEDURE — 36415 COLL VENOUS BLD VENIPUNCTURE: CPT

## 2024-01-10 PROCEDURE — 82306 VITAMIN D 25 HYDROXY: CPT

## 2024-01-10 PROCEDURE — 80053 COMPREHEN METABOLIC PANEL: CPT

## 2024-01-10 PROCEDURE — 85025 COMPLETE CBC W/AUTO DIFF WBC: CPT

## 2024-01-10 PROCEDURE — 82977 ASSAY OF GGT: CPT

## 2024-01-11 LAB
25(OH)D3 SERPL-MCNC: 77 NG/ML (ref 30–100)
GGT SERPL-CCNC: 11 U/L (ref 7–34)

## 2024-01-24 ENCOUNTER — HOSPITAL ENCOUNTER (OUTPATIENT)
Dept: LAB | Facility: MEDICAL CENTER | Age: 69
End: 2024-01-24
Attending: FAMILY MEDICINE
Payer: MEDICARE

## 2024-01-24 LAB
25(OH)D3 SERPL-MCNC: 83 NG/ML (ref 30–100)
ALBUMIN SERPL BCP-MCNC: 4.6 G/DL (ref 3.2–4.9)
ALBUMIN/GLOB SERPL: 1.5 G/DL
ALP SERPL-CCNC: 203 U/L (ref 30–99)
ALT SERPL-CCNC: 15 U/L (ref 2–50)
ANION GAP SERPL CALC-SCNC: 13 MMOL/L (ref 7–16)
AST SERPL-CCNC: 24 U/L (ref 12–45)
BASOPHILS # BLD AUTO: 1.6 % (ref 0–1.8)
BASOPHILS # BLD: 0.06 K/UL (ref 0–0.12)
BILIRUB SERPL-MCNC: 1.4 MG/DL (ref 0.1–1.5)
BUN SERPL-MCNC: 7 MG/DL (ref 8–22)
CALCIUM ALBUM COR SERPL-MCNC: 8.7 MG/DL (ref 8.5–10.5)
CALCIUM SERPL-MCNC: 9.2 MG/DL (ref 8.5–10.5)
CHLORIDE SERPL-SCNC: 100 MMOL/L (ref 96–112)
CHOLEST SERPL-MCNC: 197 MG/DL (ref 100–199)
CO2 SERPL-SCNC: 25 MMOL/L (ref 20–33)
CREAT SERPL-MCNC: 0.53 MG/DL (ref 0.5–1.4)
CRP SERPL HS-MCNC: 1.2 MG/L (ref 0–3)
DHEA-S SERPL-MCNC: 38.5 UG/DL (ref 9.4–246)
EOSINOPHIL # BLD AUTO: 0.11 K/UL (ref 0–0.51)
EOSINOPHIL NFR BLD: 3 % (ref 0–6.9)
ERYTHROCYTE [DISTWIDTH] IN BLOOD BY AUTOMATED COUNT: 43.7 FL (ref 35.9–50)
EST. AVERAGE GLUCOSE BLD GHB EST-MCNC: 108 MG/DL
ESTRADIOL SERPL-MCNC: <5 PG/ML
FASTING STATUS PATIENT QL REPORTED: NORMAL
FSH SERPL-ACNC: 85.9 MIU/ML
GFR SERPLBLD CREATININE-BSD FMLA CKD-EPI: 102 ML/MIN/1.73 M 2
GLOBULIN SER CALC-MCNC: 3 G/DL (ref 1.9–3.5)
GLUCOSE SERPL-MCNC: 93 MG/DL (ref 65–99)
HBA1C MFR BLD: 5.4 % (ref 4–5.6)
HCT VFR BLD AUTO: 41.6 % (ref 37–47)
HCYS SERPL-SCNC: 11.47 UMOL/L
HDLC SERPL-MCNC: 67 MG/DL
HGB BLD-MCNC: 14.2 G/DL (ref 12–16)
IMM GRANULOCYTES # BLD AUTO: 0 K/UL (ref 0–0.11)
IMM GRANULOCYTES NFR BLD AUTO: 0 % (ref 0–0.9)
LDLC SERPL CALC-MCNC: 114 MG/DL
LH SERPL-ACNC: 26.1 IU/L
LYMPHOCYTES # BLD AUTO: 1.01 K/UL (ref 1–4.8)
LYMPHOCYTES NFR BLD: 27.2 % (ref 22–41)
MCH RBC QN AUTO: 34.4 PG (ref 27–33)
MCHC RBC AUTO-ENTMCNC: 34.1 G/DL (ref 32.2–35.5)
MCV RBC AUTO: 100.7 FL (ref 81.4–97.8)
MONOCYTES # BLD AUTO: 0.27 K/UL (ref 0–0.85)
MONOCYTES NFR BLD AUTO: 7.3 % (ref 0–13.4)
NEUTROPHILS # BLD AUTO: 2.26 K/UL (ref 1.82–7.42)
NEUTROPHILS NFR BLD: 60.9 % (ref 44–72)
NRBC # BLD AUTO: 0 K/UL
NRBC BLD-RTO: 0 /100 WBC (ref 0–0.2)
PLATELET # BLD AUTO: 371 K/UL (ref 164–446)
PMV BLD AUTO: 11 FL (ref 9–12.9)
POTASSIUM SERPL-SCNC: 3.8 MMOL/L (ref 3.6–5.5)
PROGEST SERPL-MCNC: 0.17 NG/ML
PROLACTIN SERPL-MCNC: 9.08 NG/ML (ref 2.8–26)
PROT SERPL-MCNC: 7.6 G/DL (ref 6–8.2)
RBC # BLD AUTO: 4.13 M/UL (ref 4.2–5.4)
SODIUM SERPL-SCNC: 138 MMOL/L (ref 135–145)
T3FREE SERPL-MCNC: 3.48 PG/ML (ref 2–4.4)
T4 FREE SERPL-MCNC: 1.34 NG/DL (ref 0.93–1.7)
THYROPEROXIDASE AB SERPL-ACNC: <9 IU/ML (ref 0–9)
TRIGL SERPL-MCNC: 80 MG/DL (ref 0–149)
TSH SERPL DL<=0.005 MIU/L-ACNC: 1.97 UIU/ML (ref 0.38–5.33)
WBC # BLD AUTO: 3.7 K/UL (ref 4.8–10.8)

## 2024-01-24 PROCEDURE — 86376 MICROSOMAL ANTIBODY EACH: CPT

## 2024-01-24 PROCEDURE — 84146 ASSAY OF PROLACTIN: CPT

## 2024-01-24 PROCEDURE — 84482 T3 REVERSE: CPT

## 2024-01-24 PROCEDURE — 84443 ASSAY THYROID STIM HORMONE: CPT

## 2024-01-24 PROCEDURE — 83090 ASSAY OF HOMOCYSTEINE: CPT

## 2024-01-24 PROCEDURE — 85025 COMPLETE CBC W/AUTO DIFF WBC: CPT

## 2024-01-24 PROCEDURE — 84144 ASSAY OF PROGESTERONE: CPT

## 2024-01-24 PROCEDURE — 84439 ASSAY OF FREE THYROXINE: CPT

## 2024-01-24 PROCEDURE — 82306 VITAMIN D 25 HYDROXY: CPT

## 2024-01-24 PROCEDURE — 84305 ASSAY OF SOMATOMEDIN: CPT

## 2024-01-24 PROCEDURE — 80061 LIPID PANEL: CPT

## 2024-01-24 PROCEDURE — 84403 ASSAY OF TOTAL TESTOSTERONE: CPT

## 2024-01-24 PROCEDURE — 83036 HEMOGLOBIN GLYCOSYLATED A1C: CPT

## 2024-01-24 PROCEDURE — 83525 ASSAY OF INSULIN: CPT

## 2024-01-24 PROCEDURE — 82679 ASSAY OF ESTRONE: CPT

## 2024-01-24 PROCEDURE — 86141 C-REACTIVE PROTEIN HS: CPT

## 2024-01-24 PROCEDURE — 83002 ASSAY OF GONADOTROPIN (LH): CPT

## 2024-01-24 PROCEDURE — 82627 DEHYDROEPIANDROSTERONE: CPT

## 2024-01-24 PROCEDURE — 84402 ASSAY OF FREE TESTOSTERONE: CPT

## 2024-01-24 PROCEDURE — 36415 COLL VENOUS BLD VENIPUNCTURE: CPT

## 2024-01-24 PROCEDURE — 80053 COMPREHEN METABOLIC PANEL: CPT

## 2024-01-24 PROCEDURE — 84140 ASSAY OF PREGNENOLONE: CPT

## 2024-01-24 PROCEDURE — 82670 ASSAY OF TOTAL ESTRADIOL: CPT

## 2024-01-24 PROCEDURE — 84270 ASSAY OF SEX HORMONE GLOBUL: CPT

## 2024-01-24 PROCEDURE — 86800 THYROGLOBULIN ANTIBODY: CPT

## 2024-01-24 PROCEDURE — 84481 FREE ASSAY (FT-3): CPT

## 2024-01-24 PROCEDURE — 83001 ASSAY OF GONADOTROPIN (FSH): CPT

## 2024-01-26 LAB — INSULIN P FAST SERPL-ACNC: 5 UIU/ML (ref 3–25)

## 2024-01-27 LAB — ESTRONE SERPL-MCNC: 15.4 PG/ML

## 2024-01-29 LAB — PREG SERPL-MCNC: 66 NG/DL (ref 15–132)

## 2024-02-01 LAB
IGF-I SERPL-MCNC: 120 NG/ML (ref 32–238)
IGF-I Z-SCORE SERPL: 0.3
THYROGLOB AB SERPL-ACNC: <0.9 IU/ML (ref 0–4)

## 2024-02-03 LAB
SHBG SERPL-SCNC: 122 NMOL/L (ref 17–125)
T3REVERSE SERPL-MCNC: 15.8 NG/DL (ref 9–27)
TESTOST FREE SERPL-MCNC: 0.7 PG/ML (ref 0.6–3.8)
TESTOST SERPL-MCNC: 10 NG/DL (ref 5–32)

## 2024-04-03 ENCOUNTER — HOSPITAL ENCOUNTER (OUTPATIENT)
Facility: MEDICAL CENTER | Age: 69
End: 2024-04-03
Attending: PHYSICIAN ASSISTANT
Payer: MEDICARE

## 2024-04-03 ENCOUNTER — OFFICE VISIT (OUTPATIENT)
Dept: URGENT CARE | Facility: PHYSICIAN GROUP | Age: 69
End: 2024-04-03
Payer: MEDICARE

## 2024-04-03 VITALS
BODY MASS INDEX: 19.63 KG/M2 | DIASTOLIC BLOOD PRESSURE: 70 MMHG | HEART RATE: 93 BPM | OXYGEN SATURATION: 97 % | HEIGHT: 61 IN | RESPIRATION RATE: 12 BRPM | SYSTOLIC BLOOD PRESSURE: 110 MMHG | TEMPERATURE: 96.8 F | WEIGHT: 104 LBS

## 2024-04-03 DIAGNOSIS — M62.830 SPASM OF LUMBAR PARASPINOUS MUSCLE: ICD-10-CM

## 2024-04-03 DIAGNOSIS — S39.012A STRAIN OF LUMBAR REGION, INITIAL ENCOUNTER: ICD-10-CM

## 2024-04-03 DIAGNOSIS — R82.998 LEUKOCYTES IN URINE: ICD-10-CM

## 2024-04-03 LAB
APPEARANCE UR: CLEAR
BILIRUB UR STRIP-MCNC: NEGATIVE MG/DL
COLOR UR AUTO: YELLOW
GLUCOSE UR STRIP.AUTO-MCNC: NEGATIVE MG/DL
KETONES UR STRIP.AUTO-MCNC: NEGATIVE MG/DL
LEUKOCYTE ESTERASE UR QL STRIP.AUTO: NORMAL
NITRITE UR QL STRIP.AUTO: NEGATIVE
PH UR STRIP.AUTO: 7.5 [PH] (ref 5–8)
PROT UR QL STRIP: NEGATIVE MG/DL
RBC UR QL AUTO: NORMAL
SP GR UR STRIP.AUTO: 1.01
UROBILINOGEN UR STRIP-MCNC: 0.2 MG/DL

## 2024-04-03 PROCEDURE — 81002 URINALYSIS NONAUTO W/O SCOPE: CPT | Performed by: PHYSICIAN ASSISTANT

## 2024-04-03 PROCEDURE — 3074F SYST BP LT 130 MM HG: CPT | Performed by: PHYSICIAN ASSISTANT

## 2024-04-03 PROCEDURE — 3078F DIAST BP <80 MM HG: CPT | Performed by: PHYSICIAN ASSISTANT

## 2024-04-03 PROCEDURE — 87086 URINE CULTURE/COLONY COUNT: CPT

## 2024-04-03 PROCEDURE — 99213 OFFICE O/P EST LOW 20 MIN: CPT | Performed by: PHYSICIAN ASSISTANT

## 2024-04-03 RX ORDER — CETIRIZINE HYDROCHLORIDE 10 MG/1
10 TABLET, CHEWABLE ORAL DAILY
COMMUNITY

## 2024-04-03 RX ORDER — TIZANIDINE 4 MG/1
4 TABLET ORAL 3 TIMES DAILY PRN
Qty: 30 TABLET | Refills: 0 | Status: SHIPPED | OUTPATIENT
Start: 2024-04-03

## 2024-04-03 ASSESSMENT — FIBROSIS 4 INDEX: FIB4 SCORE: 1.1

## 2024-04-04 ASSESSMENT — ENCOUNTER SYMPTOMS
FEVER: 0
CHILLS: 0
BACK PAIN: 1
FOCAL WEAKNESS: 0

## 2024-04-04 NOTE — PROGRESS NOTES
Subjective     Aminah Maldonado is a 68 y.o. female who presents with Back Pain (Lower back pain started x 2 days )    HPI:  Aminah Maldonado is a 68 y.o. female who presents today for evaluation of back pain. Patient reports that she has been having some mild back soreness for the past few weeks. Over the past few days, however, the pain has worsened and become more persistent. She says that almost any movement gives her a jarring pain. She feels the pain in her lower back bilaterally. Pain does not radiate to lower extremities. She tried taking Tylenol once which did not help very much. She denies any bowel/bladder incontinence, focal weakness, fever, or saddle anesthesia. No injury to her back. No lower urinary tract symptoms.        Review of Systems   Constitutional:  Negative for chills and fever.   Genitourinary:  Negative for dysuria.   Musculoskeletal:  Positive for back pain.   Neurological:  Negative for focal weakness.         PMH:  has a past medical history of Endometriosis.  MEDS:   Current Outpatient Medications:     tizanidine (ZANAFLEX) 4 MG Tab, Take 1 Tablet by mouth 3 times a day as needed (Moderate pain or muscle spasm)., Disp: 30 Tablet, Rfl: 0    vitamin D3 (CHOLECALCIFEROL) 400 UNIT Tab, Take 5,000 Units by mouth every day., Disp: , Rfl:     cetirizine (ZYRTEC) 10 MG chewable tablet, Chew 10 mg every day., Disp: , Rfl:     cetirizine (ZYRTEC) 10 MG Tab, Take 10 mg by mouth as needed for Allergies., Disp: , Rfl:   ALLERGIES:   Allergies   Allergen Reactions    Cefdinir Rash    Clarithromycin     Contrast Media With Iodine [Iodine] Hives and Shortness of Breath    Doxycycline Hyclate Rash     SURGHX:   Past Surgical History:   Procedure Laterality Date    ABDOMINAL HYSTERECTOMY TOTAL      and BSO for endometriosis    APPENDECTOMY      TONSILLECTOMY AND ADENOIDECTOMY       SOCHX:  reports that she has never smoked. She has never used smokeless tobacco. She reports that she does not drink  "alcohol and does not use drugs.  FH: Family history was reviewed, no pertinent findings to report      Objective     /70 (BP Location: Left arm, Patient Position: Sitting, BP Cuff Size: Adult)   Pulse 93   Temp 36 °C (96.8 °F) (Temporal)   Resp 12   Ht 1.56 m (5' 1.4\")   Wt 47.2 kg (104 lb)   SpO2 97%   BMI 19.40 kg/m²      Physical Exam  Constitutional:       General: She is not in acute distress.     Appearance: She is not diaphoretic.   HENT:      Head: Normocephalic and atraumatic.      Right Ear: External ear normal.      Left Ear: External ear normal.   Eyes:      Conjunctiva/sclera: Conjunctivae normal.      Pupils: Pupils are equal, round, and reactive to light.   Pulmonary:      Effort: Pulmonary effort is normal. No respiratory distress.   Abdominal:      Tenderness: There is no right CVA tenderness or left CVA tenderness.   Musculoskeletal:      Cervical back: Normal range of motion.      Lumbar back: Spasms and tenderness present. No bony tenderness. Decreased range of motion. Negative right straight leg raise test and negative left straight leg raise test.      Comments: Patellar DTRs are 2+ and symmetric bilaterally. 5/5/ strength of lower extremities bilaterally. Antalgic gait.   Skin:     Findings: No rash.   Neurological:      Mental Status: She is alert and oriented to person, place, and time.   Psychiatric:         Mood and Affect: Mood and affect normal.         Cognition and Memory: Memory normal.         Judgment: Judgment normal.         POCT Urinalysis  Lab Results   Component Value Date/Time    POCCOLOR yellow 04/03/2024 03:50 PM    POCAPPEAR clear 04/03/2024 03:50 PM    POCLEUKEST small 04/03/2024 03:50 PM    POCNITRITE negative 04/03/2024 03:50 PM    POCUROBILIGE 0.2 04/03/2024 03:50 PM    POCPROTEIN negative 04/03/2024 03:50 PM    POCURPH 7.5 04/03/2024 03:50 PM    POCBLOOD trace-intact 04/03/2024 03:50 PM    POCSPGRV 1.010 04/03/2024 03:50 PM    POCKETONES negative " 04/03/2024 03:50 PM    POCBILIRUBIN negative 04/03/2024 03:50 PM    POCGLUCUA negative 04/03/2024 03:50 PM            Assessment & Plan       1. Strain of lumbar region, initial encounter  - tizanidine (ZANAFLEX) 4 MG Tab; Take 1 Tablet by mouth 3 times a day as needed (Moderate pain or muscle spasm).  Dispense: 30 Tablet; Refill: 0  - Referral to Sports Medicine  - POCT Urinalysis    2. Spasm of lumbar paraspinous muscle  - tizanidine (ZANAFLEX) 4 MG Tab; Take 1 Tablet by mouth 3 times a day as needed (Moderate pain or muscle spasm).  Dispense: 30 Tablet; Refill: 0  - Referral to Sports Medicine    Patient cautioned that muscle relaxers may cause drowsiness. She will use OTC NSAIDs. Also recommend application of ice/heat to the affected area multiple times per day. She was given stretching/ROM exercises to work on at home. Referral to sports medicine placed for more definitive management and evaluation if symptoms persist.    3. Leukocytes in urine  - URINE CULTURE(NEW); Future  UA did show leuks and RBCs. She is not endorsing any lower urinary tract symptoms, however. We will obtain an urine culture to further evaluate and will initiate treatment with abx if results come back positive for infection.

## 2024-04-06 LAB
BACTERIA UR CULT: NORMAL
SIGNIFICANT IND 70042: NORMAL
SITE SITE: NORMAL
SOURCE SOURCE: NORMAL

## 2024-04-09 ENCOUNTER — TELEPHONE (OUTPATIENT)
Dept: HEALTH INFORMATION MANAGEMENT | Facility: OTHER | Age: 69
End: 2024-04-09
Payer: MEDICARE

## 2024-06-07 ENCOUNTER — HOSPITAL ENCOUNTER (OUTPATIENT)
Dept: LAB | Facility: MEDICAL CENTER | Age: 69
End: 2024-06-07
Attending: INTERNAL MEDICINE
Payer: MEDICARE

## 2024-06-07 LAB
25(OH)D3 SERPL-MCNC: 70 NG/ML (ref 30–100)
ALBUMIN SERPL BCP-MCNC: 4.3 G/DL (ref 3.2–4.9)
ALBUMIN/GLOB SERPL: 1.8 G/DL
ALP SERPL-CCNC: 162 U/L (ref 30–99)
ALT SERPL-CCNC: 14 U/L (ref 2–50)
ANION GAP SERPL CALC-SCNC: 12 MMOL/L (ref 7–16)
AST SERPL-CCNC: 24 U/L (ref 12–45)
BASOPHILS # BLD AUTO: 1.5 % (ref 0–1.8)
BASOPHILS # BLD: 0.06 K/UL (ref 0–0.12)
BILIRUB SERPL-MCNC: 1.2 MG/DL (ref 0.1–1.5)
BUN SERPL-MCNC: 9 MG/DL (ref 8–22)
CALCIUM ALBUM COR SERPL-MCNC: 8.8 MG/DL (ref 8.5–10.5)
CALCIUM SERPL-MCNC: 9 MG/DL (ref 8.5–10.5)
CHLORIDE SERPL-SCNC: 103 MMOL/L (ref 96–112)
CHOLEST SERPL-MCNC: 192 MG/DL (ref 100–199)
CO2 SERPL-SCNC: 24 MMOL/L (ref 20–33)
CREAT SERPL-MCNC: 0.6 MG/DL (ref 0.5–1.4)
CRP SERPL HS-MCNC: 1.9 MG/L (ref 0–3)
DHEA-S SERPL-MCNC: 39.2 UG/DL (ref 9.4–246)
EOSINOPHIL # BLD AUTO: 0.1 K/UL (ref 0–0.51)
EOSINOPHIL NFR BLD: 2.5 % (ref 0–6.9)
ERYTHROCYTE [DISTWIDTH] IN BLOOD BY AUTOMATED COUNT: 45.8 FL (ref 35.9–50)
EST. AVERAGE GLUCOSE BLD GHB EST-MCNC: 108 MG/DL
ESTRADIOL SERPL-MCNC: 10.2 PG/ML
FASTING STATUS PATIENT QL REPORTED: NORMAL
GFR SERPLBLD CREATININE-BSD FMLA CKD-EPI: 98 ML/MIN/1.73 M 2
GLOBULIN SER CALC-MCNC: 2.4 G/DL (ref 1.9–3.5)
GLUCOSE SERPL-MCNC: 96 MG/DL (ref 65–99)
HBA1C MFR BLD: 5.4 % (ref 4–5.6)
HCT VFR BLD AUTO: 40.2 % (ref 37–47)
HCYS SERPL-SCNC: 7.73 UMOL/L
HDLC SERPL-MCNC: 64 MG/DL
HGB BLD-MCNC: 13.5 G/DL (ref 12–16)
IMM GRANULOCYTES # BLD AUTO: 0 K/UL (ref 0–0.11)
IMM GRANULOCYTES NFR BLD AUTO: 0 % (ref 0–0.9)
LDLC SERPL CALC-MCNC: 117 MG/DL
LYMPHOCYTES # BLD AUTO: 1.08 K/UL (ref 1–4.8)
LYMPHOCYTES NFR BLD: 27.1 % (ref 22–41)
MCH RBC QN AUTO: 34.8 PG (ref 27–33)
MCHC RBC AUTO-ENTMCNC: 33.6 G/DL (ref 32.2–35.5)
MCV RBC AUTO: 103.6 FL (ref 81.4–97.8)
MONOCYTES # BLD AUTO: 0.29 K/UL (ref 0–0.85)
MONOCYTES NFR BLD AUTO: 7.3 % (ref 0–13.4)
NEUTROPHILS # BLD AUTO: 2.45 K/UL (ref 1.82–7.42)
NEUTROPHILS NFR BLD: 61.6 % (ref 44–72)
NRBC # BLD AUTO: 0 K/UL
NRBC BLD-RTO: 0 /100 WBC (ref 0–0.2)
PLATELET # BLD AUTO: 351 K/UL (ref 164–446)
PMV BLD AUTO: 10.9 FL (ref 9–12.9)
POTASSIUM SERPL-SCNC: 4.2 MMOL/L (ref 3.6–5.5)
PROGEST SERPL-MCNC: 1.6 NG/ML
PROLACTIN SERPL-MCNC: 7.6 NG/ML (ref 2.8–26)
PROT SERPL-MCNC: 6.7 G/DL (ref 6–8.2)
RBC # BLD AUTO: 3.88 M/UL (ref 4.2–5.4)
SODIUM SERPL-SCNC: 139 MMOL/L (ref 135–145)
T3FREE SERPL-MCNC: 3.35 PG/ML (ref 2–4.4)
TRIGL SERPL-MCNC: 54 MG/DL (ref 0–149)
TSH SERPL DL<=0.005 MIU/L-ACNC: 2.12 UIU/ML (ref 0.38–5.33)
WBC # BLD AUTO: 4 K/UL (ref 4.8–10.8)

## 2024-06-07 PROCEDURE — 83090 ASSAY OF HOMOCYSTEINE: CPT

## 2024-06-07 PROCEDURE — 82679 ASSAY OF ESTRONE: CPT

## 2024-06-07 PROCEDURE — 84144 ASSAY OF PROGESTERONE: CPT

## 2024-06-07 PROCEDURE — 82306 VITAMIN D 25 HYDROXY: CPT | Mod: GZ

## 2024-06-07 PROCEDURE — 80061 LIPID PANEL: CPT

## 2024-06-07 PROCEDURE — 86141 C-REACTIVE PROTEIN HS: CPT

## 2024-06-07 PROCEDURE — 84403 ASSAY OF TOTAL TESTOSTERONE: CPT

## 2024-06-07 PROCEDURE — 84443 ASSAY THYROID STIM HORMONE: CPT

## 2024-06-07 PROCEDURE — 80053 COMPREHEN METABOLIC PANEL: CPT

## 2024-06-07 PROCEDURE — 36415 COLL VENOUS BLD VENIPUNCTURE: CPT

## 2024-06-07 PROCEDURE — 84482 T3 REVERSE: CPT

## 2024-06-07 PROCEDURE — 82670 ASSAY OF TOTAL ESTRADIOL: CPT

## 2024-06-07 PROCEDURE — 83036 HEMOGLOBIN GLYCOSYLATED A1C: CPT | Mod: GZ

## 2024-06-07 PROCEDURE — 84270 ASSAY OF SEX HORMONE GLOBUL: CPT

## 2024-06-07 PROCEDURE — 84402 ASSAY OF FREE TESTOSTERONE: CPT

## 2024-06-07 PROCEDURE — 84305 ASSAY OF SOMATOMEDIN: CPT

## 2024-06-07 PROCEDURE — 85025 COMPLETE CBC W/AUTO DIFF WBC: CPT

## 2024-06-07 PROCEDURE — 84140 ASSAY OF PREGNENOLONE: CPT

## 2024-06-07 PROCEDURE — 84146 ASSAY OF PROLACTIN: CPT

## 2024-06-07 PROCEDURE — 82627 DEHYDROEPIANDROSTERONE: CPT

## 2024-06-07 PROCEDURE — 84481 FREE ASSAY (FT-3): CPT

## 2024-06-07 PROCEDURE — 83525 ASSAY OF INSULIN: CPT

## 2024-06-09 LAB
IGF-I SERPL-MCNC: 112 NG/ML (ref 32–238)
IGF-I Z-SCORE SERPL: 0.1

## 2024-06-10 LAB
INSULIN P FAST SERPL-ACNC: 3 UIU/ML (ref 3–25)
T3REVERSE SERPL-MCNC: 16.1 NG/DL (ref 9–27)

## 2024-06-11 LAB — PREG SERPL-MCNC: 59 NG/DL (ref 15–132)

## 2024-06-12 LAB — ESTRONE SERPL-MCNC: 16 PG/ML

## 2024-06-13 LAB
SHBG SERPL-SCNC: 115 NMOL/L (ref 17–125)
TESTOST FREE SERPL-MCNC: 2.5 PG/ML (ref 0.6–3.8)
TESTOST SERPL-MCNC: 36 NG/DL (ref 5–32)

## 2024-12-27 ENCOUNTER — HOSPITAL ENCOUNTER (OUTPATIENT)
Dept: LAB | Facility: MEDICAL CENTER | Age: 69
End: 2024-12-27
Attending: FAMILY MEDICINE
Payer: MEDICARE

## 2024-12-27 LAB
25(OH)D3 SERPL-MCNC: 49 NG/ML (ref 30–100)
ALBUMIN SERPL BCP-MCNC: 4.5 G/DL (ref 3.2–4.9)
ALBUMIN/GLOB SERPL: 1.6 G/DL
ALP SERPL-CCNC: 185 U/L (ref 30–99)
ALT SERPL-CCNC: 13 U/L (ref 2–50)
ANION GAP SERPL CALC-SCNC: 13 MMOL/L (ref 7–16)
AST SERPL-CCNC: 16 U/L (ref 12–45)
BASOPHILS # BLD AUTO: 1.2 % (ref 0–1.8)
BASOPHILS # BLD: 0.06 K/UL (ref 0–0.12)
BILIRUB SERPL-MCNC: 1.1 MG/DL (ref 0.1–1.5)
BUN SERPL-MCNC: 11 MG/DL (ref 8–22)
CALCIUM ALBUM COR SERPL-MCNC: 9 MG/DL (ref 8.5–10.5)
CALCIUM SERPL-MCNC: 9.4 MG/DL (ref 8.5–10.5)
CHLORIDE SERPL-SCNC: 102 MMOL/L (ref 96–112)
CHOLEST SERPL-MCNC: 214 MG/DL (ref 100–199)
CO2 SERPL-SCNC: 26 MMOL/L (ref 20–33)
CREAT SERPL-MCNC: 0.46 MG/DL (ref 0.5–1.4)
CRP SERPL HS-MCNC: 2.3 MG/L (ref 0–3)
DHEA-S SERPL-MCNC: 34.2 UG/DL (ref 9.4–246)
EOSINOPHIL # BLD AUTO: 0.12 K/UL (ref 0–0.51)
EOSINOPHIL NFR BLD: 2.4 % (ref 0–6.9)
ERYTHROCYTE [DISTWIDTH] IN BLOOD BY AUTOMATED COUNT: 47.5 FL (ref 35.9–50)
EST. AVERAGE GLUCOSE BLD GHB EST-MCNC: 100 MG/DL
ESTRADIOL SERPL-MCNC: 16.2 PG/ML
FASTING STATUS PATIENT QL REPORTED: NORMAL
GFR SERPLBLD CREATININE-BSD FMLA CKD-EPI: 105 ML/MIN/1.73 M 2
GLOBULIN SER CALC-MCNC: 2.9 G/DL (ref 1.9–3.5)
GLUCOSE SERPL-MCNC: 89 MG/DL (ref 65–99)
HBA1C MFR BLD: 5.1 % (ref 4–5.6)
HCT VFR BLD AUTO: 41.7 % (ref 37–47)
HCYS SERPL-SCNC: 7.7 UMOL/L
HDLC SERPL-MCNC: 65 MG/DL
HGB BLD-MCNC: 14.1 G/DL (ref 12–16)
IMM GRANULOCYTES # BLD AUTO: 0.01 K/UL (ref 0–0.11)
IMM GRANULOCYTES NFR BLD AUTO: 0.2 % (ref 0–0.9)
LDLC SERPL CALC-MCNC: 134 MG/DL
LYMPHOCYTES # BLD AUTO: 1.08 K/UL (ref 1–4.8)
LYMPHOCYTES NFR BLD: 21.7 % (ref 22–41)
MCH RBC QN AUTO: 35 PG (ref 27–33)
MCHC RBC AUTO-ENTMCNC: 33.8 G/DL (ref 32.2–35.5)
MCV RBC AUTO: 103.5 FL (ref 81.4–97.8)
MONOCYTES # BLD AUTO: 0.35 K/UL (ref 0–0.85)
MONOCYTES NFR BLD AUTO: 7 % (ref 0–13.4)
NEUTROPHILS # BLD AUTO: 3.36 K/UL (ref 1.82–7.42)
NEUTROPHILS NFR BLD: 67.5 % (ref 44–72)
NRBC # BLD AUTO: 0 K/UL
NRBC BLD-RTO: 0 /100 WBC (ref 0–0.2)
PLATELET # BLD AUTO: 357 K/UL (ref 164–446)
PMV BLD AUTO: 10.9 FL (ref 9–12.9)
POTASSIUM SERPL-SCNC: 4.2 MMOL/L (ref 3.6–5.5)
PROGEST SERPL-MCNC: 2.27 NG/ML
PROLACTIN SERPL-MCNC: 9.17 NG/ML (ref 2.8–26)
PROT SERPL-MCNC: 7.4 G/DL (ref 6–8.2)
RBC # BLD AUTO: 4.03 M/UL (ref 4.2–5.4)
SODIUM SERPL-SCNC: 141 MMOL/L (ref 135–145)
T3FREE SERPL-MCNC: 3.12 PG/ML (ref 2–4.4)
TRIGL SERPL-MCNC: 75 MG/DL (ref 0–149)
TSH SERPL-ACNC: 1.92 UIU/ML (ref 0.35–5.5)
WBC # BLD AUTO: 5 K/UL (ref 4.8–10.8)

## 2024-12-27 PROCEDURE — 80061 LIPID PANEL: CPT

## 2024-12-27 PROCEDURE — 84270 ASSAY OF SEX HORMONE GLOBUL: CPT

## 2024-12-27 PROCEDURE — 84443 ASSAY THYROID STIM HORMONE: CPT

## 2024-12-27 PROCEDURE — 82306 VITAMIN D 25 HYDROXY: CPT

## 2024-12-27 PROCEDURE — 82627 DEHYDROEPIANDROSTERONE: CPT

## 2024-12-27 PROCEDURE — 84402 ASSAY OF FREE TESTOSTERONE: CPT

## 2024-12-27 PROCEDURE — 86141 C-REACTIVE PROTEIN HS: CPT

## 2024-12-27 PROCEDURE — 36415 COLL VENOUS BLD VENIPUNCTURE: CPT

## 2024-12-27 PROCEDURE — 82670 ASSAY OF TOTAL ESTRADIOL: CPT

## 2024-12-27 PROCEDURE — 82679 ASSAY OF ESTRONE: CPT

## 2024-12-27 PROCEDURE — 84482 T3 REVERSE: CPT

## 2024-12-27 PROCEDURE — 84140 ASSAY OF PREGNENOLONE: CPT

## 2024-12-27 PROCEDURE — 84144 ASSAY OF PROGESTERONE: CPT

## 2024-12-27 PROCEDURE — 85025 COMPLETE CBC W/AUTO DIFF WBC: CPT

## 2024-12-27 PROCEDURE — 84146 ASSAY OF PROLACTIN: CPT

## 2024-12-27 PROCEDURE — 84305 ASSAY OF SOMATOMEDIN: CPT

## 2024-12-27 PROCEDURE — 84481 FREE ASSAY (FT-3): CPT

## 2024-12-27 PROCEDURE — 84403 ASSAY OF TOTAL TESTOSTERONE: CPT

## 2024-12-27 PROCEDURE — 83036 HEMOGLOBIN GLYCOSYLATED A1C: CPT | Mod: GA

## 2024-12-27 PROCEDURE — 83090 ASSAY OF HOMOCYSTEINE: CPT

## 2024-12-27 PROCEDURE — 80053 COMPREHEN METABOLIC PANEL: CPT

## 2024-12-29 LAB
ESTRONE SERPL-MCNC: 16.3 PG/ML
IGF-I SERPL-MCNC: 103 NG/ML (ref 30–235)
IGF-I Z-SCORE SERPL: 0

## 2024-12-30 LAB
PREG SERPL-MCNC: 61 NG/DL (ref 15–132)
SHBG SERPL-SCNC: 105 NMOL/L (ref 17–125)
TESTOST FREE SERPL-MCNC: 3 PG/ML (ref 0.6–3.8)
TESTOST SERPL-MCNC: 40 NG/DL (ref 5–32)

## 2024-12-31 LAB — T3REVERSE SERPL-MCNC: 13.5 NG/DL (ref 9–27)

## 2025-02-12 ENCOUNTER — HOSPITAL ENCOUNTER (OUTPATIENT)
Dept: LAB | Facility: MEDICAL CENTER | Age: 70
End: 2025-02-12
Attending: FAMILY MEDICINE
Payer: MEDICARE

## 2025-02-12 PROCEDURE — 83921 ORGANIC ACID SINGLE QUANT: CPT

## 2025-02-12 PROCEDURE — 81291 MTHFR GENE: CPT

## 2025-02-12 PROCEDURE — 36415 COLL VENOUS BLD VENIPUNCTURE: CPT

## 2025-02-12 PROCEDURE — 86340 INTRINSIC FACTOR ANTIBODY: CPT

## 2025-02-12 PROCEDURE — 83516 IMMUNOASSAY NONANTIBODY: CPT

## 2025-02-12 PROCEDURE — 82607 VITAMIN B-12: CPT

## 2025-02-13 LAB — VIT B12 SERPL-MCNC: 748 PG/ML (ref 211–911)

## 2025-02-14 LAB — IF BLOCK AB SER QL RIA: NEGATIVE

## 2025-02-16 LAB
METHYLMALONATE SERPL-SCNC: 0.11 UMOL/L (ref 0–0.4)
PCA IGG SER-ACNC: 28.3 UNITS (ref 0–24.9)

## 2025-02-18 LAB
MTHFR C.1298A>C GENO BLD/T: NORMAL
MTHFR C.677C>T GENO BLD/T: NORMAL
MTHFR GENE MUT ANL BLD/T: NORMAL

## 2025-03-03 ENCOUNTER — HOSPITAL ENCOUNTER (OUTPATIENT)
Dept: LAB | Facility: MEDICAL CENTER | Age: 70
End: 2025-03-03
Attending: FAMILY MEDICINE
Payer: MEDICARE

## 2025-03-05 ENCOUNTER — HOSPITAL ENCOUNTER (OUTPATIENT)
Dept: LAB | Facility: MEDICAL CENTER | Age: 70
End: 2025-03-05
Attending: FAMILY MEDICINE
Payer: MEDICARE

## 2025-05-06 NOTE — TELEPHONE ENCOUNTER
From: Aminah Maldonado  To: Evi Suggs M.D.  Sent: 2/1/2018 11:20 AM PST  Subject: Non-Urgent Medical Question    Dr. Suggs,  I am still having systems regarding my swelling of my eyes and face. I believe there is still something going on. Could we do more tests or should I see specialist. Looks like my blood tests are abnormal.  Thanks.  Aminah Maldonado   (E4) spontaneous

## 2025-05-20 ENCOUNTER — HOSPITAL ENCOUNTER (OUTPATIENT)
Dept: RADIOLOGY | Facility: MEDICAL CENTER | Age: 70
End: 2025-05-20
Attending: FAMILY MEDICINE
Payer: MEDICARE

## 2025-05-20 DIAGNOSIS — Z12.31 VISIT FOR SCREENING MAMMOGRAM: ICD-10-CM

## 2025-05-20 PROCEDURE — 77067 SCR MAMMO BI INCL CAD: CPT

## 2025-06-20 ENCOUNTER — HOSPITAL ENCOUNTER (OUTPATIENT)
Dept: LAB | Facility: MEDICAL CENTER | Age: 70
End: 2025-06-20
Attending: FAMILY MEDICINE
Payer: MEDICARE

## 2025-06-20 LAB
25(OH)D3 SERPL-MCNC: 31 NG/ML (ref 30–100)
ALBUMIN SERPL BCP-MCNC: 4.4 G/DL (ref 3.2–4.9)
ALBUMIN/GLOB SERPL: 1.6 G/DL
ALP SERPL-CCNC: 174 U/L (ref 30–99)
ALT SERPL-CCNC: 15 U/L (ref 2–50)
ANION GAP SERPL CALC-SCNC: 11 MMOL/L (ref 7–16)
AST SERPL-CCNC: 22 U/L (ref 12–45)
BASOPHILS # BLD AUTO: 2.3 % (ref 0–1.8)
BASOPHILS # BLD: 0.09 K/UL (ref 0–0.12)
BILIRUB SERPL-MCNC: 1.3 MG/DL (ref 0.1–1.5)
BUN SERPL-MCNC: 8 MG/DL (ref 8–22)
CALCIUM ALBUM COR SERPL-MCNC: 8.9 MG/DL (ref 8.5–10.5)
CALCIUM SERPL-MCNC: 9.2 MG/DL (ref 8.5–10.5)
CHLORIDE SERPL-SCNC: 103 MMOL/L (ref 96–112)
CO2 SERPL-SCNC: 25 MMOL/L (ref 20–33)
CREAT SERPL-MCNC: 0.63 MG/DL (ref 0.5–1.4)
CRP SERPL HS-MCNC: 2 MG/L (ref 0–3)
DHEA-S SERPL-MCNC: 37.2 UG/DL (ref 9.4–246)
EOSINOPHIL # BLD AUTO: 0.3 K/UL (ref 0–0.51)
EOSINOPHIL NFR BLD: 7.7 % (ref 0–6.9)
ERYTHROCYTE [DISTWIDTH] IN BLOOD BY AUTOMATED COUNT: 44.5 FL (ref 35.9–50)
EST. AVERAGE GLUCOSE BLD GHB EST-MCNC: 108 MG/DL
ESTRADIOL SERPL-MCNC: <5 PG/ML
GFR SERPLBLD CREATININE-BSD FMLA CKD-EPI: 97 ML/MIN/1.73 M 2
GLOBULIN SER CALC-MCNC: 2.7 G/DL (ref 1.9–3.5)
GLUCOSE SERPL-MCNC: 97 MG/DL (ref 65–99)
HBA1C MFR BLD: 5.4 % (ref 4–5.6)
HCT VFR BLD AUTO: 41.3 % (ref 37–47)
HGB BLD-MCNC: 13.8 G/DL (ref 12–16)
IMM GRANULOCYTES # BLD AUTO: 0 K/UL (ref 0–0.11)
IMM GRANULOCYTES NFR BLD AUTO: 0 % (ref 0–0.9)
LYMPHOCYTES # BLD AUTO: 1.1 K/UL (ref 1–4.8)
LYMPHOCYTES NFR BLD: 28.2 % (ref 22–41)
MCH RBC QN AUTO: 34.3 PG (ref 27–33)
MCHC RBC AUTO-ENTMCNC: 33.4 G/DL (ref 32.2–35.5)
MCV RBC AUTO: 102.7 FL (ref 81.4–97.8)
MONOCYTES # BLD AUTO: 0.28 K/UL (ref 0–0.85)
MONOCYTES NFR BLD AUTO: 7.2 % (ref 0–13.4)
NEUTROPHILS # BLD AUTO: 2.13 K/UL (ref 1.82–7.42)
NEUTROPHILS NFR BLD: 54.6 % (ref 44–72)
NRBC # BLD AUTO: 0 K/UL
NRBC BLD-RTO: 0 /100 WBC (ref 0–0.2)
PLATELET # BLD AUTO: 381 K/UL (ref 164–446)
PMV BLD AUTO: 10.6 FL (ref 9–12.9)
POTASSIUM SERPL-SCNC: 4.2 MMOL/L (ref 3.6–5.5)
PROGEST SERPL-MCNC: 1.02 NG/ML
PROT SERPL-MCNC: 7.1 G/DL (ref 6–8.2)
RBC # BLD AUTO: 4.02 M/UL (ref 4.2–5.4)
SODIUM SERPL-SCNC: 139 MMOL/L (ref 135–145)
T3FREE SERPL-MCNC: 2.95 PG/ML (ref 2–4.4)
TSH SERPL-ACNC: 1.79 UIU/ML (ref 0.38–5.33)
WBC # BLD AUTO: 3.9 K/UL (ref 4.8–10.8)

## 2025-06-20 PROCEDURE — 84443 ASSAY THYROID STIM HORMONE: CPT | Mod: GA

## 2025-06-20 PROCEDURE — 84305 ASSAY OF SOMATOMEDIN: CPT

## 2025-06-20 PROCEDURE — 84140 ASSAY OF PREGNENOLONE: CPT | Mod: GY

## 2025-06-20 PROCEDURE — 85025 COMPLETE CBC W/AUTO DIFF WBC: CPT | Mod: GA

## 2025-06-20 PROCEDURE — 86141 C-REACTIVE PROTEIN HS: CPT | Mod: GA

## 2025-06-20 PROCEDURE — 84270 ASSAY OF SEX HORMONE GLOBUL: CPT

## 2025-06-20 PROCEDURE — 36415 COLL VENOUS BLD VENIPUNCTURE: CPT

## 2025-06-20 PROCEDURE — 82670 ASSAY OF TOTAL ESTRADIOL: CPT

## 2025-06-20 PROCEDURE — 82627 DEHYDROEPIANDROSTERONE: CPT

## 2025-06-20 PROCEDURE — 83036 HEMOGLOBIN GLYCOSYLATED A1C: CPT | Mod: GA

## 2025-06-20 PROCEDURE — 82306 VITAMIN D 25 HYDROXY: CPT | Mod: GA

## 2025-06-20 PROCEDURE — 83525 ASSAY OF INSULIN: CPT

## 2025-06-20 PROCEDURE — 84402 ASSAY OF FREE TESTOSTERONE: CPT

## 2025-06-20 PROCEDURE — 84144 ASSAY OF PROGESTERONE: CPT

## 2025-06-20 PROCEDURE — 84403 ASSAY OF TOTAL TESTOSTERONE: CPT

## 2025-06-20 PROCEDURE — 82679 ASSAY OF ESTRONE: CPT

## 2025-06-20 PROCEDURE — 84481 FREE ASSAY (FT-3): CPT

## 2025-06-20 PROCEDURE — 80053 COMPREHEN METABOLIC PANEL: CPT

## 2025-06-22 LAB
IGF-I SERPL-MCNC: 91 NG/ML (ref 30–235)
IGF-I Z-SCORE SERPL: -0.3
INSULIN P FAST SERPL-ACNC: 3 UIU/ML (ref 3–25)

## 2025-06-24 LAB — PREG SERPL-MCNC: 68 NG/DL (ref 15–132)

## 2025-06-25 LAB — ESTRONE SERPL-MCNC: 14.5 PG/ML

## 2025-06-26 LAB
SHBG SERPL-SCNC: 107 NMOL/L (ref 17–125)
TESTOST FREE SERPL-MCNC: 1.3 PG/ML (ref 0.6–3.8)
TESTOST SERPL-MCNC: 17 NG/DL (ref 5–32)